# Patient Record
Sex: MALE | Race: BLACK OR AFRICAN AMERICAN | Employment: OTHER | URBAN - NONMETROPOLITAN AREA
[De-identification: names, ages, dates, MRNs, and addresses within clinical notes are randomized per-mention and may not be internally consistent; named-entity substitution may affect disease eponyms.]

---

## 2017-01-03 ENCOUNTER — OFFICE VISIT (OUTPATIENT)
Dept: FAMILY MEDICINE CLINIC | Age: 32
End: 2017-01-03
Payer: MEDICARE

## 2017-01-03 VITALS
TEMPERATURE: 99.1 F | WEIGHT: 211 LBS | DIASTOLIC BLOOD PRESSURE: 60 MMHG | BODY MASS INDEX: 35.11 KG/M2 | OXYGEN SATURATION: 98 % | HEART RATE: 110 BPM | SYSTOLIC BLOOD PRESSURE: 120 MMHG

## 2017-01-03 DIAGNOSIS — I10 ESSENTIAL HYPERTENSION: ICD-10-CM

## 2017-01-03 DIAGNOSIS — R73.02 GLUCOSE INTOLERANCE (IMPAIRED GLUCOSE TOLERANCE): Primary | ICD-10-CM

## 2017-01-03 DIAGNOSIS — F70 MILD INTELLECTUAL DISABILITY: ICD-10-CM

## 2017-01-03 PROCEDURE — 99214 OFFICE O/P EST MOD 30 MIN: CPT | Performed by: FAMILY MEDICINE

## 2017-01-03 RX ORDER — OLOPATADINE HYDROCHLORIDE 2 MG/ML
1 SOLUTION/ DROPS OPHTHALMIC DAILY
COMMUNITY

## 2017-01-03 ASSESSMENT — ENCOUNTER SYMPTOMS
ALLERGIC/IMMUNOLOGIC NEGATIVE: 1
EYES NEGATIVE: 1
GASTROINTESTINAL NEGATIVE: 1
RESPIRATORY NEGATIVE: 1

## 2017-01-18 DIAGNOSIS — R73.02 GLUCOSE INTOLERANCE (IMPAIRED GLUCOSE TOLERANCE): ICD-10-CM

## 2017-01-18 DIAGNOSIS — I10 ESSENTIAL HYPERTENSION: ICD-10-CM

## 2017-01-18 LAB
ALBUMIN SERPL-MCNC: 3.7 G/DL (ref 3.5–5.2)
ALP BLD-CCNC: 49 U/L (ref 40–130)
ALT SERPL-CCNC: 12 U/L (ref 5–41)
ANION GAP SERPL CALCULATED.3IONS-SCNC: 14 MMOL/L (ref 7–19)
AST SERPL-CCNC: 18 U/L (ref 5–40)
BILIRUB SERPL-MCNC: 0.3 MG/DL (ref 0.2–1.2)
BILIRUBIN URINE: NEGATIVE
BLOOD, URINE: NEGATIVE
BUN BLDV-MCNC: 13 MG/DL (ref 6–20)
CALCIUM SERPL-MCNC: 9.2 MG/DL (ref 8.6–10)
CHLORIDE BLD-SCNC: 97 MMOL/L (ref 98–111)
CHOLESTEROL, TOTAL: 167 MG/DL (ref 160–199)
CLARITY: CLEAR
CO2: 27 MMOL/L (ref 22–29)
COLOR: YELLOW
CREAT SERPL-MCNC: 1.1 MG/DL (ref 0.5–1.2)
GFR NON-AFRICAN AMERICAN: >60
GLOBULIN: 3.9 G/DL
GLUCOSE BLD-MCNC: 81 MG/DL (ref 74–109)
GLUCOSE URINE: NEGATIVE MG/DL
HBA1C MFR BLD: 5.7 %
HCT VFR BLD CALC: 47.8 % (ref 42–52)
HDLC SERPL-MCNC: 60 MG/DL (ref 55–121)
HEMOGLOBIN: 15.2 G/DL (ref 14–18)
KETONES, URINE: NEGATIVE MG/DL
LDL CHOLESTEROL CALCULATED: 89 MG/DL
LEUKOCYTE ESTERASE, URINE: NEGATIVE
MCH RBC QN AUTO: 25 PG (ref 27–31)
MCHC RBC AUTO-ENTMCNC: 31.8 G/DL (ref 33–37)
MCV RBC AUTO: 78.6 FL (ref 80–94)
NITRITE, URINE: NEGATIVE
PDW BLD-RTO: 15.3 % (ref 11.5–14.5)
PH UA: 7.5
PLATELET # BLD: 284 K/UL (ref 130–400)
PMV BLD AUTO: 11.3 FL (ref 7.4–10.4)
POTASSIUM SERPL-SCNC: 4.5 MMOL/L (ref 3.5–5)
PROTEIN UA: NEGATIVE MG/DL
RBC # BLD: 6.08 M/UL (ref 4.7–6.1)
SODIUM BLD-SCNC: 138 MMOL/L (ref 136–145)
SPECIFIC GRAVITY UA: 1.02
TOTAL PROTEIN: 7.6 G/DL (ref 6.6–8.7)
TRIGL SERPL-MCNC: 92 MG/DL (ref 150–199)
TSH SERPL DL<=0.05 MIU/L-ACNC: 1.11 UIU/ML (ref 0.27–4.2)
UROBILINOGEN, URINE: 0.2 E.U./DL
WBC # BLD: 6.2 K/UL (ref 4.8–10.8)

## 2017-08-01 ENCOUNTER — OFFICE VISIT (OUTPATIENT)
Dept: FAMILY MEDICINE CLINIC | Age: 32
End: 2017-08-01
Payer: MEDICARE

## 2017-08-01 VITALS
OXYGEN SATURATION: 97 % | DIASTOLIC BLOOD PRESSURE: 62 MMHG | WEIGHT: 184 LBS | HEART RATE: 98 BPM | BODY MASS INDEX: 30.66 KG/M2 | SYSTOLIC BLOOD PRESSURE: 116 MMHG | TEMPERATURE: 98.3 F | HEIGHT: 65 IN | RESPIRATION RATE: 16 BRPM

## 2017-08-01 DIAGNOSIS — F32.5 MAJOR DEPRESSION, SINGLE EPISODE, IN COMPLETE REMISSION (HCC): ICD-10-CM

## 2017-08-01 DIAGNOSIS — F20.89 OTHER SCHIZOPHRENIA (HCC): ICD-10-CM

## 2017-08-01 DIAGNOSIS — R73.02 GLUCOSE INTOLERANCE (IMPAIRED GLUCOSE TOLERANCE): ICD-10-CM

## 2017-08-01 DIAGNOSIS — R11.0 CHRONIC NAUSEA: Primary | ICD-10-CM

## 2017-08-01 DIAGNOSIS — R11.0 CHRONIC NAUSEA: ICD-10-CM

## 2017-08-01 LAB
ALBUMIN SERPL-MCNC: 4.4 G/DL (ref 3.5–5.2)
ALP BLD-CCNC: 50 U/L (ref 40–130)
ALT SERPL-CCNC: 12 U/L (ref 5–41)
AMYLASE: 72 U/L (ref 28–100)
ANION GAP SERPL CALCULATED.3IONS-SCNC: 16 MMOL/L (ref 7–19)
AST SERPL-CCNC: 17 U/L (ref 5–40)
BILIRUB SERPL-MCNC: <0.2 MG/DL (ref 0.2–1.2)
BUN BLDV-MCNC: 16 MG/DL (ref 6–20)
CALCIUM SERPL-MCNC: 9.8 MG/DL (ref 8.6–10)
CHLORIDE BLD-SCNC: 101 MMOL/L (ref 98–111)
CO2: 24 MMOL/L (ref 22–29)
CREAT SERPL-MCNC: 1.3 MG/DL (ref 0.5–1.2)
GFR NON-AFRICAN AMERICAN: >60
GLUCOSE BLD-MCNC: 64 MG/DL (ref 74–109)
HCT VFR BLD CALC: 49.3 % (ref 42–52)
HEMOGLOBIN: 16.2 G/DL (ref 14–18)
LIPASE: 28 U/L (ref 13–60)
MCH RBC QN AUTO: 25.7 PG (ref 27–31)
MCHC RBC AUTO-ENTMCNC: 32.9 G/DL (ref 33–37)
MCV RBC AUTO: 78.3 FL (ref 80–94)
PDW BLD-RTO: 17.8 % (ref 11.5–14.5)
PLATELET # BLD: 280 K/UL (ref 130–400)
PMV BLD AUTO: 10.7 FL (ref 9.4–12.4)
POTASSIUM SERPL-SCNC: 4.4 MMOL/L (ref 3.5–5)
RBC # BLD: 6.3 M/UL (ref 4.7–6.1)
SODIUM BLD-SCNC: 141 MMOL/L (ref 136–145)
TOTAL PROTEIN: 8 G/DL (ref 6.6–8.7)
WBC # BLD: 8.4 K/UL (ref 4.8–10.8)

## 2017-08-01 PROCEDURE — G8427 DOCREV CUR MEDS BY ELIG CLIN: HCPCS | Performed by: FAMILY MEDICINE

## 2017-08-01 PROCEDURE — 1036F TOBACCO NON-USER: CPT | Performed by: FAMILY MEDICINE

## 2017-08-01 PROCEDURE — G8419 CALC BMI OUT NRM PARAM NOF/U: HCPCS | Performed by: FAMILY MEDICINE

## 2017-08-01 PROCEDURE — 99214 OFFICE O/P EST MOD 30 MIN: CPT | Performed by: FAMILY MEDICINE

## 2017-08-01 ASSESSMENT — ENCOUNTER SYMPTOMS
ALLERGIC/IMMUNOLOGIC NEGATIVE: 1
RESPIRATORY NEGATIVE: 1
NAUSEA: 1
EYES NEGATIVE: 1

## 2017-08-03 ENCOUNTER — TELEPHONE (OUTPATIENT)
Dept: FAMILY MEDICINE CLINIC | Age: 32
End: 2017-08-03

## 2017-08-03 DIAGNOSIS — I10 ESSENTIAL HYPERTENSION: Primary | ICD-10-CM

## 2017-08-14 DIAGNOSIS — I10 ESSENTIAL HYPERTENSION: ICD-10-CM

## 2017-08-14 LAB
ANION GAP SERPL CALCULATED.3IONS-SCNC: 14 MMOL/L (ref 7–19)
BUN BLDV-MCNC: 6 MG/DL (ref 6–20)
CALCIUM SERPL-MCNC: 10 MG/DL (ref 8.6–10)
CHLORIDE BLD-SCNC: 97 MMOL/L (ref 98–111)
CO2: 26 MMOL/L (ref 22–29)
CREAT SERPL-MCNC: 1 MG/DL (ref 0.5–1.2)
GFR NON-AFRICAN AMERICAN: >60
GLUCOSE BLD-MCNC: 77 MG/DL (ref 74–109)
POTASSIUM SERPL-SCNC: 4.9 MMOL/L (ref 3.5–5)
SODIUM BLD-SCNC: 137 MMOL/L (ref 136–145)

## 2018-03-21 ENCOUNTER — TELEPHONE (OUTPATIENT)
Dept: FAMILY MEDICINE CLINIC | Age: 33
End: 2018-03-21

## 2018-08-31 ENCOUNTER — OFFICE VISIT (OUTPATIENT)
Dept: FAMILY MEDICINE CLINIC | Age: 33
End: 2018-08-31
Payer: MEDICARE

## 2018-08-31 VITALS
OXYGEN SATURATION: 98 % | HEIGHT: 65 IN | HEART RATE: 93 BPM | BODY MASS INDEX: 35.65 KG/M2 | DIASTOLIC BLOOD PRESSURE: 78 MMHG | WEIGHT: 214 LBS | TEMPERATURE: 98.6 F | SYSTOLIC BLOOD PRESSURE: 120 MMHG

## 2018-08-31 DIAGNOSIS — R79.89 PROLACTIN INCREASED: Primary | ICD-10-CM

## 2018-08-31 DIAGNOSIS — I10 ESSENTIAL HYPERTENSION: ICD-10-CM

## 2018-08-31 DIAGNOSIS — F20.89 OTHER SCHIZOPHRENIA (HCC): ICD-10-CM

## 2018-08-31 DIAGNOSIS — R79.89 PROLACTIN INCREASED: ICD-10-CM

## 2018-08-31 DIAGNOSIS — J30.9 ALLERGIC RHINITIS, UNSPECIFIED SEASONALITY, UNSPECIFIED TRIGGER: ICD-10-CM

## 2018-08-31 DIAGNOSIS — F39 EPISODIC MOOD DISORDER (HCC): ICD-10-CM

## 2018-08-31 LAB
ALBUMIN SERPL-MCNC: 4.3 G/DL (ref 3.5–5.2)
ALP BLD-CCNC: 51 U/L (ref 40–130)
ALT SERPL-CCNC: 13 U/L (ref 5–41)
ANION GAP SERPL CALCULATED.3IONS-SCNC: 14 MMOL/L (ref 7–19)
AST SERPL-CCNC: 18 U/L (ref 5–40)
BILIRUB SERPL-MCNC: <0.2 MG/DL (ref 0.2–1.2)
BILIRUBIN URINE: NEGATIVE
BLOOD, URINE: NEGATIVE
BUN BLDV-MCNC: 18 MG/DL (ref 6–20)
CALCIUM SERPL-MCNC: 9.3 MG/DL (ref 8.6–10)
CHLORIDE BLD-SCNC: 98 MMOL/L (ref 98–111)
CHOLESTEROL, TOTAL: 163 MG/DL (ref 160–199)
CLARITY: CLEAR
CO2: 26 MMOL/L (ref 22–29)
COLOR: YELLOW
CREAT SERPL-MCNC: 1.1 MG/DL (ref 0.5–1.2)
GFR NON-AFRICAN AMERICAN: >60
GLUCOSE BLD-MCNC: 87 MG/DL (ref 74–109)
GLUCOSE URINE: NEGATIVE MG/DL
HCT VFR BLD CALC: 44.6 % (ref 42–52)
HDLC SERPL-MCNC: 55 MG/DL (ref 55–121)
HEMOGLOBIN: 14.3 G/DL (ref 14–18)
KETONES, URINE: NEGATIVE MG/DL
LDL CHOLESTEROL CALCULATED: 87 MG/DL
LEUKOCYTE ESTERASE, URINE: NEGATIVE
MCH RBC QN AUTO: 25.3 PG (ref 27–31)
MCHC RBC AUTO-ENTMCNC: 32.1 G/DL (ref 33–37)
MCV RBC AUTO: 78.8 FL (ref 80–94)
NITRITE, URINE: NEGATIVE
PDW BLD-RTO: 14.8 % (ref 11.5–14.5)
PH UA: 7.5
PLATELET # BLD: 231 K/UL (ref 130–400)
PMV BLD AUTO: 10.9 FL (ref 9.4–12.4)
POTASSIUM SERPL-SCNC: 4.8 MMOL/L (ref 3.5–5)
PROTEIN UA: NEGATIVE MG/DL
RBC # BLD: 5.66 M/UL (ref 4.7–6.1)
SODIUM BLD-SCNC: 138 MMOL/L (ref 136–145)
SPECIFIC GRAVITY UA: 1.02
TOTAL PROTEIN: 7.8 G/DL (ref 6.6–8.7)
TRIGL SERPL-MCNC: 103 MG/DL (ref 0–149)
TSH SERPL DL<=0.05 MIU/L-ACNC: 1.34 UIU/ML (ref 0.27–4.2)
UROBILINOGEN, URINE: 0.2 E.U./DL
WBC # BLD: 4.3 K/UL (ref 4.8–10.8)

## 2018-08-31 PROCEDURE — G8427 DOCREV CUR MEDS BY ELIG CLIN: HCPCS | Performed by: FAMILY MEDICINE

## 2018-08-31 PROCEDURE — 99214 OFFICE O/P EST MOD 30 MIN: CPT | Performed by: FAMILY MEDICINE

## 2018-08-31 PROCEDURE — G8417 CALC BMI ABV UP PARAM F/U: HCPCS | Performed by: FAMILY MEDICINE

## 2018-08-31 PROCEDURE — 1036F TOBACCO NON-USER: CPT | Performed by: FAMILY MEDICINE

## 2018-08-31 RX ORDER — IBUPROFEN 800 MG/1
800 TABLET ORAL EVERY 6 HOURS PRN
COMMUNITY

## 2018-08-31 ASSESSMENT — ENCOUNTER SYMPTOMS
EYES NEGATIVE: 1
RESPIRATORY NEGATIVE: 1
GASTROINTESTINAL NEGATIVE: 1
ALLERGIC/IMMUNOLOGIC NEGATIVE: 1

## 2018-08-31 ASSESSMENT — PATIENT HEALTH QUESTIONNAIRE - PHQ9
SUM OF ALL RESPONSES TO PHQ QUESTIONS 1-9: 0
1. LITTLE INTEREST OR PLEASURE IN DOING THINGS: 0
SUM OF ALL RESPONSES TO PHQ9 QUESTIONS 1 & 2: 0
2. FEELING DOWN, DEPRESSED OR HOPELESS: 0
SUM OF ALL RESPONSES TO PHQ QUESTIONS 1-9: 0

## 2018-08-31 NOTE — PROGRESS NOTES
hours as needed for Pain.  loperamide (IMODIUM) 2 MG capsule Take 2 mg by mouth 4 times daily as needed for Diarrhea. No current facility-administered medications on file prior to visit. Allergies   Allergen Reactions    Citalopram     Lexapro [Escitalopram Oxalate]     Seasonal     Serotonin Reuptake Inhibitors (Ssris)      History reviewed. No pertinent surgical history. Family History   Problem Relation Age of Onset    Family history unknown: Yes     Social History     Social History    Marital status: Single     Spouse name: N/A    Number of children: N/A    Years of education: N/A     Occupational History    Not on file. Social History Main Topics    Smoking status: Former Smoker    Smokeless tobacco: Never Used    Alcohol use No    Drug use: No    Sexual activity: Not on file     Other Topics Concern    Not on file     Social History Narrative    No narrative on file       Review of Systems   Constitutional: Negative. HENT: Negative. Eyes: Negative. Respiratory: Negative. Cardiovascular: Negative. Gastrointestinal: Negative. Endocrine: Negative. Genitourinary: Negative. Musculoskeletal: Negative. Skin: Negative. Allergic/Immunologic: Negative. Neurological: Negative. Hematological: Negative. Psychiatric/Behavioral: Negative. OBJECTIVE:    Physical Exam   Constitutional: He is oriented to person, place, and time. He appears well-developed and well-nourished. HENT:   Head: Normocephalic and atraumatic. Right Ear: External ear normal.   Left Ear: External ear normal.   Nose: Nose normal.   Mouth/Throat: Oropharynx is clear and moist.   Eyes: Pupils are equal, round, and reactive to light. Conjunctivae and EOM are normal.   Neck: Normal range of motion. Neck supple. Cardiovascular: Normal rate, regular rhythm, S1 normal, S2 normal, normal heart sounds, intact distal pulses and normal pulses.     Pulmonary/Chest: Effort normal and breath sounds normal. No apnea. Abdominal: Soft. Normal appearance. Musculoskeletal: Normal range of motion. Neurological: He is alert and oriented to person, place, and time. He has normal strength and normal reflexes. Skin: Skin is warm, dry and intact. Psychiatric: He has a normal mood and affect. His speech is normal and behavior is normal. Judgment and thought content normal. Cognition and memory are normal.   Vitals reviewed. /78 (Site: Left Arm, Position: Sitting, Cuff Size: Large Adult)   Pulse 93   Temp 98.6 °F (37 °C) (Oral)   Ht 5' 5\" (1.651 m)   Wt 214 lb (97.1 kg)   SpO2 98%   BMI 35.61 kg/m²      ASSESSMENT:     Diagnosis Orders   1. Medicare annual wellness visit, subsequent     2. Prolactin increased ([facility] Will continue to monitor. More than likely secondary to medications. PTH, intact and calcium    Prolactin   3. Other schizophrenia ([facility] Follow by psychiatry     4. Episodic mood disorder ([facility] Followed by psychiatry. 5. Essential hypertension Stable  CBC    Comprehensive Metabolic Panel    Lipid Panel    Urinalysis    TSH without Reflex   6. Allergic rhinitis, unspecified seasonality, unspecified trigger Well control          PLAN:    1. See other note. 2. Obtain pro-lactate levels. 3. Follow-up with psychiatry. 4. Follow with psychiatry. 5. Continue blood pressure medication obtain blood work today. 6. Continue medications.

## 2018-08-31 NOTE — PROGRESS NOTES
3 mg by mouth Take 2 tablets at bedtime      acetaminophen (TYLENOL) 500 MG tablet Take 1,000 mg by mouth every 4 hours as needed for Pain.  loperamide (IMODIUM) 2 MG capsule Take 2 mg by mouth 4 times daily as needed for Diarrhea. No current facility-administered medications for this visit. Allergies   Allergen Reactions    Citalopram     Lexapro [Escitalopram Oxalate]     Seasonal     Serotonin Reuptake Inhibitors (Ssris)        Review of Systems   Constitutional: Negative. HENT: Negative. Eyes: Negative. Respiratory: Negative. Cardiovascular: Negative. Gastrointestinal: Negative. Endocrine: Negative. Genitourinary: Negative. Musculoskeletal: Negative. Skin: Negative. Allergic/Immunologic: Negative. Neurological: Negative. Hematological: Negative. Psychiatric/Behavioral: Negative. OBJECTIVE:    Physical Exam   Constitutional: He is oriented to person, place, and time. He appears well-developed and well-nourished. HENT:   Head: Normocephalic and atraumatic. Right Ear: External ear normal.   Left Ear: External ear normal.   Nose: Nose normal.   Mouth/Throat: Oropharynx is clear and moist.   Eyes: Pupils are equal, round, and reactive to light. Conjunctivae and EOM are normal.   Neck: Normal range of motion. Neck supple. Cardiovascular: Normal rate, regular rhythm, S1 normal, S2 normal, normal heart sounds, intact distal pulses and normal pulses. Pulmonary/Chest: Effort normal and breath sounds normal. No apnea. Abdominal: Soft. Normal appearance. Musculoskeletal: Normal range of motion. Neurological: He is alert and oriented to person, place, and time. He has normal strength and normal reflexes. Skin: Skin is warm, dry and intact. Psychiatric: He has a normal mood and affect. His speech is normal and behavior is normal. Judgment and thought content normal. Cognition and memory are normal.   Vitals reviewed.      /78 (Site:

## 2018-09-02 LAB — PROLACTIN: 30.2 NG/ML (ref 2.1–17.7)

## 2018-09-28 LAB
ALBUMIN SERPL-MCNC: 4 G/DL (ref 3.5–5.2)
ALP BLD-CCNC: 50 U/L (ref 40–130)
ALT SERPL-CCNC: 18 U/L (ref 5–41)
ANION GAP SERPL CALCULATED.3IONS-SCNC: 14 MMOL/L (ref 7–19)
AST SERPL-CCNC: 19 U/L (ref 5–40)
BASOPHILS ABSOLUTE: 0 K/UL (ref 0–0.2)
BASOPHILS RELATIVE PERCENT: 0.6 % (ref 0–1)
BILIRUB SERPL-MCNC: <0.2 MG/DL (ref 0.2–1.2)
BUN BLDV-MCNC: 13 MG/DL (ref 6–20)
CALCIUM SERPL-MCNC: 9.7 MG/DL (ref 8.6–10)
CHLORIDE BLD-SCNC: 99 MMOL/L (ref 98–111)
CHOLESTEROL, TOTAL: 167 MG/DL (ref 160–199)
CO2: 27 MMOL/L (ref 22–29)
CREAT SERPL-MCNC: 1 MG/DL (ref 0.5–1.2)
EOSINOPHILS ABSOLUTE: 0.2 K/UL (ref 0–0.6)
EOSINOPHILS RELATIVE PERCENT: 3.1 % (ref 0–5)
GFR NON-AFRICAN AMERICAN: >60
GLUCOSE BLD-MCNC: 111 MG/DL (ref 74–109)
HCT VFR BLD CALC: 44.3 % (ref 42–52)
HDLC SERPL-MCNC: 54 MG/DL (ref 55–121)
HEMOGLOBIN: 14 G/DL (ref 14–18)
LDL CHOLESTEROL CALCULATED: 75 MG/DL
LYMPHOCYTES ABSOLUTE: 2.4 K/UL (ref 1.1–4.5)
LYMPHOCYTES RELATIVE PERCENT: 43.9 % (ref 20–40)
MCH RBC QN AUTO: 25.2 PG (ref 27–31)
MCHC RBC AUTO-ENTMCNC: 31.6 G/DL (ref 33–37)
MCV RBC AUTO: 79.8 FL (ref 80–94)
MONOCYTES ABSOLUTE: 0.5 K/UL (ref 0–0.9)
MONOCYTES RELATIVE PERCENT: 9.2 % (ref 0–10)
NEUTROPHILS ABSOLUTE: 2.3 K/UL (ref 1.5–7.5)
NEUTROPHILS RELATIVE PERCENT: 42.6 % (ref 50–65)
PDW BLD-RTO: 15.6 % (ref 11.5–14.5)
PLATELET # BLD: 209 K/UL (ref 130–400)
PMV BLD AUTO: 11.7 FL (ref 9.4–12.4)
POTASSIUM SERPL-SCNC: 4.2 MMOL/L (ref 3.5–5)
RBC # BLD: 5.55 M/UL (ref 4.7–6.1)
SODIUM BLD-SCNC: 140 MMOL/L (ref 136–145)
TOTAL PROTEIN: 7.1 G/DL (ref 6.6–8.7)
TRIGL SERPL-MCNC: 190 MG/DL (ref 0–149)
WBC # BLD: 5.5 K/UL (ref 4.8–10.8)

## 2018-10-01 LAB — KEPPRA: <2 UG/ML (ref 12–46)

## 2018-10-04 ENCOUNTER — TELEPHONE (OUTPATIENT)
Dept: FAMILY MEDICINE CLINIC | Age: 33
End: 2018-10-04

## 2018-10-05 RX ORDER — CALCIUM CARBONATE 200(500)MG
2 TABLET,CHEWABLE ORAL DAILY PRN
Qty: 90 TABLET | Refills: 3 | Status: SHIPPED | OUTPATIENT
Start: 2018-10-05 | End: 2018-11-04

## 2018-12-04 ENCOUNTER — TELEPHONE (OUTPATIENT)
Dept: FAMILY MEDICINE CLINIC | Age: 33
End: 2018-12-04

## 2018-12-04 DIAGNOSIS — K21.9 GASTROESOPHAGEAL REFLUX DISEASE WITHOUT ESOPHAGITIS: Primary | ICD-10-CM

## 2018-12-04 RX ORDER — OMEPRAZOLE 20 MG/1
20 CAPSULE, DELAYED RELEASE ORAL
Qty: 30 CAPSULE | Refills: 5 | Status: SHIPPED | OUTPATIENT
Start: 2018-12-04 | End: 2019-05-31 | Stop reason: SDUPTHER

## 2019-03-19 DIAGNOSIS — I10 ESSENTIAL HYPERTENSION: ICD-10-CM

## 2019-03-19 LAB
ALBUMIN SERPL-MCNC: 4 G/DL (ref 3.5–5.2)
ALP BLD-CCNC: 54 U/L (ref 40–130)
ALT SERPL-CCNC: 16 U/L (ref 5–41)
ANION GAP SERPL CALCULATED.3IONS-SCNC: 13 MMOL/L (ref 7–19)
AST SERPL-CCNC: 19 U/L (ref 5–40)
BASOPHILS ABSOLUTE: 0.1 K/UL (ref 0–0.2)
BASOPHILS RELATIVE PERCENT: 0.7 % (ref 0–1)
BILIRUB SERPL-MCNC: <0.2 MG/DL (ref 0.2–1.2)
BUN BLDV-MCNC: 12 MG/DL (ref 6–20)
CALCIUM SERPL-MCNC: 9.3 MG/DL (ref 8.6–10)
CHLORIDE BLD-SCNC: 99 MMOL/L (ref 98–111)
CHOLESTEROL, TOTAL: 166 MG/DL (ref 160–199)
CO2: 28 MMOL/L (ref 22–29)
CREAT SERPL-MCNC: 0.9 MG/DL (ref 0.5–1.2)
EOSINOPHILS ABSOLUTE: 0.1 K/UL (ref 0–0.6)
EOSINOPHILS RELATIVE PERCENT: 1.6 % (ref 0–5)
GFR NON-AFRICAN AMERICAN: >60
GLUCOSE BLD-MCNC: 98 MG/DL (ref 74–109)
HCT VFR BLD CALC: 45.1 % (ref 42–52)
HDLC SERPL-MCNC: 47 MG/DL (ref 55–121)
HEMOGLOBIN: 13.9 G/DL (ref 14–18)
LDL CHOLESTEROL CALCULATED: 88 MG/DL
LYMPHOCYTES ABSOLUTE: 3.4 K/UL (ref 1.1–4.5)
LYMPHOCYTES RELATIVE PERCENT: 49.1 % (ref 20–40)
MCH RBC QN AUTO: 24.8 PG (ref 27–31)
MCHC RBC AUTO-ENTMCNC: 30.8 G/DL (ref 33–37)
MCV RBC AUTO: 80.5 FL (ref 80–94)
MONOCYTES ABSOLUTE: 0.6 K/UL (ref 0–0.9)
MONOCYTES RELATIVE PERCENT: 8.7 % (ref 0–10)
NEUTROPHILS ABSOLUTE: 2.6 K/UL (ref 1.5–7.5)
NEUTROPHILS RELATIVE PERCENT: 38.4 % (ref 50–65)
PDW BLD-RTO: 16 % (ref 11.5–14.5)
PLATELET # BLD: 325 K/UL (ref 130–400)
PMV BLD AUTO: 11 FL (ref 9.4–12.4)
POTASSIUM SERPL-SCNC: 4.4 MMOL/L (ref 3.5–5)
RBC # BLD: 5.6 M/UL (ref 4.7–6.1)
SODIUM BLD-SCNC: 140 MMOL/L (ref 136–145)
TOTAL PROTEIN: 7.5 G/DL (ref 6.6–8.7)
TRIGL SERPL-MCNC: 154 MG/DL (ref 0–149)
VALPROIC ACID LEVEL: 76.7 UG/ML (ref 50–100)
WBC # BLD: 6.9 K/UL (ref 4.8–10.8)

## 2019-03-19 RX ORDER — LISINOPRIL AND HYDROCHLOROTHIAZIDE 20; 12.5 MG/1; MG/1
1 TABLET ORAL DAILY
Qty: 90 TABLET | Refills: 3 | Status: SHIPPED | OUTPATIENT
Start: 2019-03-19 | End: 2020-09-08

## 2019-04-04 ENCOUNTER — TELEPHONE (OUTPATIENT)
Dept: FAMILY MEDICINE CLINIC | Age: 34
End: 2019-04-04

## 2019-04-05 RX ORDER — NICOTINE 21 MG/24HR
1 PATCH, TRANSDERMAL 24 HOURS TRANSDERMAL EVERY 24 HOURS
Qty: 30 PATCH | Refills: 5 | Status: SHIPPED | OUTPATIENT
Start: 2019-04-05 | End: 2019-05-07 | Stop reason: ALTCHOICE

## 2019-05-07 ENCOUNTER — TELEPHONE (OUTPATIENT)
Dept: FAMILY MEDICINE CLINIC | Age: 34
End: 2019-05-07

## 2019-05-07 DIAGNOSIS — R73.02 GLUCOSE INTOLERANCE (IMPAIRED GLUCOSE TOLERANCE): ICD-10-CM

## 2019-05-07 NOTE — TELEPHONE ENCOUNTER
Antoinette Found, with [facility], called stating they are using the nicotine patches on Martell Spence, but he is still smoking. Wanting to know if they should D/C the patches and if you had any other recommendations?

## 2019-05-31 DIAGNOSIS — K21.9 GASTROESOPHAGEAL REFLUX DISEASE WITHOUT ESOPHAGITIS: ICD-10-CM

## 2019-05-31 RX ORDER — OMEPRAZOLE 20 MG/1
20 CAPSULE, DELAYED RELEASE ORAL
Qty: 30 CAPSULE | Refills: 5 | Status: SHIPPED | OUTPATIENT
Start: 2019-05-31 | End: 2021-03-08

## 2019-09-18 ENCOUNTER — OFFICE VISIT (OUTPATIENT)
Dept: FAMILY MEDICINE CLINIC | Age: 34
End: 2019-09-18
Payer: MEDICARE

## 2019-09-18 VITALS
WEIGHT: 244 LBS | OXYGEN SATURATION: 98 % | TEMPERATURE: 97.7 F | HEIGHT: 65 IN | DIASTOLIC BLOOD PRESSURE: 78 MMHG | BODY MASS INDEX: 40.65 KG/M2 | HEART RATE: 112 BPM | SYSTOLIC BLOOD PRESSURE: 122 MMHG

## 2019-09-18 DIAGNOSIS — Z00.00 WELLNESS EXAMINATION: Primary | ICD-10-CM

## 2019-09-18 DIAGNOSIS — E66.01 MORBID OBESITY WITH BMI OF 40.0-44.9, ADULT (HCC): ICD-10-CM

## 2019-09-18 PROCEDURE — G0438 PPPS, INITIAL VISIT: HCPCS | Performed by: FAMILY MEDICINE

## 2019-09-18 RX ORDER — CALCIUM CARBONATE 500(1250)
500 TABLET ORAL DAILY
COMMUNITY
End: 2021-09-22

## 2019-09-18 RX ORDER — LORATADINE 10 MG/1
TABLET ORAL
Qty: 30 TABLET | Refills: 11 | Status: SHIPPED | OUTPATIENT
Start: 2019-09-18 | End: 2021-03-08

## 2019-09-18 ASSESSMENT — ENCOUNTER SYMPTOMS
RESPIRATORY NEGATIVE: 1
EYES NEGATIVE: 1
ALLERGIC/IMMUNOLOGIC NEGATIVE: 1
GASTROINTESTINAL NEGATIVE: 1

## 2019-09-18 NOTE — PROGRESS NOTES
 benztropine (COGENTIN) 2 MG tablet Take 2 mg by mouth 2 times daily.  divalproex (DEPAKOTE) 500 MG ER tablet Take 500 mg by mouth 3 times daily       risperiDONE (RISPERDAL) 2 MG tablet Take 3 mg by mouth Take 2 tablets at bedtime      acetaminophen (TYLENOL) 500 MG tablet Take 1,000 mg by mouth every 4 hours as needed for Pain.  loperamide (IMODIUM) 2 MG capsule Take 2 mg by mouth 4 times daily as needed for Diarrhea. No current facility-administered medications for this visit. Allergies   Allergen Reactions    Citalopram     Lexapro [Escitalopram Oxalate]     Seasonal     Serotonin Reuptake Inhibitors (Ssris)        Review of Systems   Constitutional: Negative. HENT: Negative. Eyes: Negative. Respiratory: Negative. Cardiovascular: Negative. Gastrointestinal: Negative. Endocrine: Negative. Genitourinary: Negative. Musculoskeletal: Negative. Skin: Negative. Allergic/Immunologic: Negative. Neurological: Negative. Hematological: Negative. Psychiatric/Behavioral: Negative. OBJECTIVE:    Physical Exam   Constitutional: He is oriented to person, place, and time. He appears well-developed and well-nourished. HENT:   Head: Normocephalic and atraumatic. Right Ear: External ear normal.   Left Ear: External ear normal.   Nose: Nose normal.   Mouth/Throat: Oropharynx is clear and moist.   Eyes: Pupils are equal, round, and reactive to light. Conjunctivae and EOM are normal.   Neck: Normal range of motion. Neck supple. Cardiovascular: Normal rate, regular rhythm, S1 normal, S2 normal, normal heart sounds, intact distal pulses and normal pulses. Pulmonary/Chest: Effort normal and breath sounds normal. No apnea. Abdominal: Soft. Normal appearance. Musculoskeletal: Normal range of motion. Neurological: He is alert and oriented to person, place, and time. He has normal strength and normal reflexes. Skin: Skin is warm, dry and intact. Psychiatric: He has a normal mood and affect. His speech is normal and behavior is normal. Judgment and thought content normal. Cognition and memory are normal.   Vitals reviewed. /78 (Site: Left Upper Arm, Position: Sitting, Cuff Size: Large Adult)   Pulse 112   Temp 97.7 °F (36.5 °C) (Temporal)   Ht 5' 5\" (1.651 m)   Wt 244 lb (110.7 kg)   SpO2 98%   BMI 40.60 kg/m²      ASSESSMENT:     Diagnosis Orders   1. Wellness examination     2. Morbid obesity with BMI of 40.0-44.9, adult ([facility])          PLAN:    1./2.  Encourage diet and exercise. Discussed with psychiatry the necessity of using 2 different antipsychotics. Blood work obtained earlier this year. Follow-up in 6 months for reassessment.

## 2019-09-18 NOTE — PATIENT INSTRUCTIONS
exposed skin. · See a dentist one or two times a year for checkups and to have your teeth cleaned. · Wear a seat belt in the car. Follow your doctor's advice about when to have certain tests. These tests can spot problems early. For everyone  · Cholesterol. Have the fat (cholesterol) in your blood tested after age 21. Your doctor will tell you how often to have this done based on your age, family history, or other things that can increase your risk for heart disease. · Blood pressure. Have your blood pressure checked during a routine doctor visit. Your doctor will tell you how often to check your blood pressure based on your age, your blood pressure results, and other factors. · Vision. Talk with your doctor about how often to have a glaucoma test.  · Diabetes. Ask your doctor whether you should have tests for diabetes. · Colon cancer. Your risk for colorectal cancer gets higher as you get older. Some experts say that adults should start regular screening at age 48 and stop at age 76. Others say to start before age 48 or continue after age 76. Talk with your doctor about your risk and when to start and stop screening. For women  · Breast exam and mammogram. Talk to your doctor about when you should have a clinical breast exam and a mammogram. Medical experts differ on whether and how often women under 50 should have these tests. Your doctor can help you decide what is right for you. · Cervical cancer screening test and pelvic exam. Begin with a Pap test at age 24. The test often is part of a pelvic exam. Starting at age 27, you may choose to have a Pap test, an HPV test, or both tests at the same time (called co-testing). Talk with your doctor about how often to have testing. · Tests for sexually transmitted infections (STIs). Ask whether you should have tests for STIs. You may be at risk if you have sex with more than one person, especially if your partners do not wear condoms.   For men  · Tests for sexually transmitted infections (STIs). Ask whether you should have tests for STIs. You may be at risk if you have sex with more than one person, especially if you do not wear a condom. · Testicular cancer exam. Ask your doctor whether you should check your testicles regularly. · Prostate exam. Talk to your doctor about whether you should have a blood test (called a PSA test) for prostate cancer. Experts differ on whether and when men should have this test. Some experts suggest it if you are older than 39 and are -American or have a father or brother who got prostate cancer when he was younger than 72. When should you call for help? Watch closely for changes in your health, and be sure to contact your doctor if you have any problems or symptoms that concern you. Where can you learn more? Go to [email or web address] org and sign in to your Narrative account. Enter P072 in the [place] box to learn more about \"Well Visit, Ages 25 to 48: Care Instructions. \"     If you do not have an account, please click on the \"Sign Up Now\" link. Current as of: December 13, 2018  Content Version: 12.1  © 0187-7108 Healthwise, Incorporated. Care instructions adapted under license by [facility] ([facility]). If you have questions about a medical condition or this instruction, always ask your healthcare professional. [address] any warranty or liability for your use of this information. Patient Education        Starting a Weight Loss Plan: Care Instructions  Your Care Instructions    If you are thinking about losing weight, it can be hard to know where to start. Your doctor can help you set up a weight loss plan that best meets your needs. You may want to take a class on nutrition or exercise, or join a weight loss support group.  If you have questions about how to make changes to your eating or exercise habits, ask your doctor about seeing a registered dietitian or an to [email or web address] and sign in to your DocASAPhart account. Enter Q387 in the [place] box to learn more about \"Starting a Weight Loss Plan: Care Instructions. \"     If you do not have an account, please click on the \"Sign Up Now\" link. Current as of: March 28, 2019  Content Version: 12.1  © 4422-5190 Healthwise, [facility]. Care instructions adapted under license by [facility] ([facility]). If you have questions about a medical condition or this instruction, always ask your healthcare professional. [place] [address] any warranty or liability for your use of this information.

## 2019-12-26 ENCOUNTER — TELEPHONE (OUTPATIENT)
Dept: FAMILY MEDICINE CLINIC | Age: 34
End: 2019-12-26

## 2020-03-16 ENCOUNTER — OFFICE VISIT (OUTPATIENT)
Dept: FAMILY MEDICINE CLINIC | Age: 35
End: 2020-03-16
Payer: MEDICARE

## 2020-03-16 VITALS
WEIGHT: 253 LBS | OXYGEN SATURATION: 98 % | DIASTOLIC BLOOD PRESSURE: 74 MMHG | SYSTOLIC BLOOD PRESSURE: 120 MMHG | HEART RATE: 110 BPM | TEMPERATURE: 97.4 F | BODY MASS INDEX: 42.1 KG/M2

## 2020-03-16 DIAGNOSIS — F20.89 OTHER SCHIZOPHRENIA (HCC): ICD-10-CM

## 2020-03-16 DIAGNOSIS — R79.89 PROLACTIN INCREASED: ICD-10-CM

## 2020-03-16 DIAGNOSIS — R73.02 GLUCOSE INTOLERANCE (IMPAIRED GLUCOSE TOLERANCE): ICD-10-CM

## 2020-03-16 LAB
ALBUMIN SERPL-MCNC: 4.3 G/DL (ref 3.5–5.2)
ALP BLD-CCNC: 58 U/L (ref 40–130)
ALT SERPL-CCNC: 20 U/L (ref 5–41)
ANION GAP SERPL CALCULATED.3IONS-SCNC: 16 MMOL/L (ref 7–19)
AST SERPL-CCNC: 23 U/L (ref 5–40)
BILIRUB SERPL-MCNC: <0.2 MG/DL (ref 0.2–1.2)
BUN BLDV-MCNC: 11 MG/DL (ref 6–20)
CALCIUM SERPL-MCNC: 9.8 MG/DL (ref 8.6–10)
CHLORIDE BLD-SCNC: 94 MMOL/L (ref 98–111)
CO2: 26 MMOL/L (ref 22–29)
CREAT SERPL-MCNC: 0.9 MG/DL (ref 0.5–1.2)
GFR NON-AFRICAN AMERICAN: >60
GLUCOSE BLD-MCNC: 110 MG/DL (ref 74–109)
HBA1C MFR BLD: 6.2 % (ref 4–6)
HCT VFR BLD CALC: 45.8 % (ref 42–52)
HEMOGLOBIN: 14.2 G/DL (ref 14–18)
MCH RBC QN AUTO: 24.5 PG (ref 27–31)
MCHC RBC AUTO-ENTMCNC: 31 G/DL (ref 33–37)
MCV RBC AUTO: 79.1 FL (ref 80–94)
PDW BLD-RTO: 16.9 % (ref 11.5–14.5)
PLATELET # BLD: 275 K/UL (ref 130–400)
PMV BLD AUTO: 11.5 FL (ref 9.4–12.4)
POTASSIUM SERPL-SCNC: 4 MMOL/L (ref 3.5–5)
RBC # BLD: 5.79 M/UL (ref 4.7–6.1)
SODIUM BLD-SCNC: 136 MMOL/L (ref 136–145)
TOTAL PROTEIN: 8 G/DL (ref 6.6–8.7)
TSH SERPL DL<=0.05 MIU/L-ACNC: 1.48 UIU/ML (ref 0.27–4.2)
WBC # BLD: 7 K/UL (ref 4.8–10.8)

## 2020-03-16 PROCEDURE — G8427 DOCREV CUR MEDS BY ELIG CLIN: HCPCS | Performed by: FAMILY MEDICINE

## 2020-03-16 PROCEDURE — 99214 OFFICE O/P EST MOD 30 MIN: CPT | Performed by: FAMILY MEDICINE

## 2020-03-16 PROCEDURE — G8417 CALC BMI ABV UP PARAM F/U: HCPCS | Performed by: FAMILY MEDICINE

## 2020-03-16 PROCEDURE — 1036F TOBACCO NON-USER: CPT | Performed by: FAMILY MEDICINE

## 2020-03-16 PROCEDURE — G8484 FLU IMMUNIZE NO ADMIN: HCPCS | Performed by: FAMILY MEDICINE

## 2020-03-16 RX ORDER — BENZONATATE 200 MG/1
200 CAPSULE ORAL 3 TIMES DAILY PRN
COMMUNITY

## 2020-03-16 ASSESSMENT — ENCOUNTER SYMPTOMS
RESPIRATORY NEGATIVE: 1
EYES NEGATIVE: 1
GASTROINTESTINAL NEGATIVE: 1
ALLERGIC/IMMUNOLOGIC NEGATIVE: 1

## 2020-03-16 ASSESSMENT — PATIENT HEALTH QUESTIONNAIRE - PHQ9
2. FEELING DOWN, DEPRESSED OR HOPELESS: 0
SUM OF ALL RESPONSES TO PHQ QUESTIONS 1-9: 0
SUM OF ALL RESPONSES TO PHQ QUESTIONS 1-9: 0
SUM OF ALL RESPONSES TO PHQ9 QUESTIONS 1 & 2: 0
1. LITTLE INTEREST OR PLEASURE IN DOING THINGS: 0

## 2020-03-18 LAB — PROLACTIN: 38.8 NG/ML (ref 2.1–17.7)

## 2020-04-30 ENCOUNTER — VIRTUAL VISIT (OUTPATIENT)
Dept: FAMILY MEDICINE CLINIC | Age: 35
End: 2020-04-30
Payer: MEDICARE

## 2020-04-30 PROCEDURE — 99441 PR PHYS/QHP TELEPHONE EVALUATION 5-10 MIN: CPT | Performed by: FAMILY MEDICINE

## 2020-04-30 NOTE — PROGRESS NOTES
Meño Turner is a 30 y.o. male evaluated via telephone on 4/30/2020. Consent:  He and/or health care decision maker is aware that that he may receive a bill for this telephone service, depending on his insurance coverage, and has provided verbal consent to proceed: Yes      Documentation:  I communicated with the patient and/or health care decision maker about patient is a 56-year-old -American male. He has mental disabilities and lives in a group home. He has been having less erections according to him. Not sure exactly if he want to pursue therapy. He does not seem to be able to verbalize he require therapy. Details of this discussion including any medical advice provided: I explained to him that erectile dysfunction is common with advanced age. His sexual ability will decrease with time. At the same time medication side effect can be contributing to this. Also smoking does not help and will encourage him to quit smoking. Weight control and exercise will be beneficial also. Offered therapy but not sure if that was something that he wanted. At this point we will not prescribe anything      I affirm this is a Patient Initiated Episode with an Established Patient who has not had a related appointment within my department in the past 7 days or scheduled within the next 24 hours.     Patient identification was verified at the start of the visit: Yes    Total Time: minutes: 5-10 minutes    Note: not billable if this call serves to triage the patient into an appointment for the relevant concern      Sonia White

## 2020-09-02 ENCOUNTER — TELEPHONE (OUTPATIENT)
Dept: FAMILY MEDICINE CLINIC | Age: 35
End: 2020-09-02

## 2020-09-08 ENCOUNTER — OFFICE VISIT (OUTPATIENT)
Dept: FAMILY MEDICINE CLINIC | Age: 35
End: 2020-09-08
Payer: MEDICARE

## 2020-09-08 VITALS
OXYGEN SATURATION: 97 % | WEIGHT: 252 LBS | HEART RATE: 102 BPM | DIASTOLIC BLOOD PRESSURE: 82 MMHG | BODY MASS INDEX: 41.93 KG/M2 | SYSTOLIC BLOOD PRESSURE: 122 MMHG | TEMPERATURE: 97.3 F

## 2020-09-08 PROCEDURE — G8417 CALC BMI ABV UP PARAM F/U: HCPCS | Performed by: FAMILY MEDICINE

## 2020-09-08 PROCEDURE — 1036F TOBACCO NON-USER: CPT | Performed by: FAMILY MEDICINE

## 2020-09-08 PROCEDURE — G8427 DOCREV CUR MEDS BY ELIG CLIN: HCPCS | Performed by: FAMILY MEDICINE

## 2020-09-08 PROCEDURE — 99214 OFFICE O/P EST MOD 30 MIN: CPT | Performed by: FAMILY MEDICINE

## 2020-09-08 RX ORDER — AMLODIPINE BESYLATE 5 MG/1
5 TABLET ORAL DAILY
Qty: 30 TABLET | Refills: 5 | Status: SHIPPED | OUTPATIENT
Start: 2020-09-08 | End: 2020-10-21 | Stop reason: SDUPTHER

## 2020-09-08 RX ORDER — HYDROCHLOROTHIAZIDE 12.5 MG/1
12.5 TABLET ORAL DAILY
Qty: 30 TABLET | Refills: 5 | Status: SHIPPED | OUTPATIENT
Start: 2020-09-08 | End: 2021-02-08 | Stop reason: SDUPTHER

## 2020-09-08 ASSESSMENT — ENCOUNTER SYMPTOMS
GASTROINTESTINAL NEGATIVE: 1
EYES NEGATIVE: 1
ALLERGIC/IMMUNOLOGIC NEGATIVE: 1
RESPIRATORY NEGATIVE: 1

## 2020-09-08 NOTE — PROGRESS NOTES
SUBJECTIVE:    Patient ID: Kamini Robertson is a 37 y.o.male. HPI:   Patient here for follow-up of multiple medical problems. Patient is a 31-year-old -American male that have mental disabilities and lives in a group home. He developed angioedema on the face and was taken to urgent care. He was diagnosed with ACE inhibitor induced angioedema. Medication was discontinued. He was placed on Norvasc and HCTZ. Blood pressure today is well controlled. He is doing well. Swelling have resolved. He also have chronic left knee pain. He has been having worsening pain for the last couple of months. He is taking ibuprofen. He also is obese. He has been having a hard time decreasing weight. He is not following any time with diet. He wants to get help losing weight. Past Medical History:   Diagnosis Date    Allergic rhinitis     Episodic mood disorder ([facility] [address] [facility]     Hyperlipidemia     Hypertension     Mild intellectual disabilities     Morbid obesity with BMI of 40.0-44.9, adult ([facility]) 9/18/2019    Schizophrenia ([facility])       Current Outpatient Medications   Medication Sig Dispense Refill    hydroCHLOROthiazide (HYDRODIURIL) 12.5 MG tablet Take 1 tablet by mouth daily 30 tablet 5    amLODIPine (NORVASC) 5 MG tablet Take 1 tablet by mouth daily 30 tablet 5    benzonatate (TESSALON) 200 MG capsule Take 200 mg by mouth 3 times daily as needed for Cough      loratadine (CLARITIN) 10 MG tablet GIVE ONE TABLET BY MOUTH ONCE DAILY.   DX: ALLERGIES 30 tablet 11    calcium carbonate (OSCAL) 500 MG TABS tablet Take 500 mg by mouth daily      omeprazole (PRILOSEC) 20 MG delayed release capsule Take 1 capsule by mouth every morning (before breakfast) 30 capsule 5    ibuprofen (ADVIL;MOTRIN) 800 MG tablet Take 800 mg by mouth every 6 hours as needed for Pain      olopatadine (PATADAY) 0.2 % SOLN ophthalmic solution 1 drop daily      polyethylene glycol (GLYCOLAX) powder Take 17 g by mouth daily as needed 1 Bottle 5    QUEtiapine (SEROQUEL) 200 MG tablet Take 200 mg by mouth Daily      glucose blood VI test strips (EXACTECH TEST) strip DX: 250.00  Test once daily 100 each 3    glucose monitoring kit (FREESTYLE) monitoring kit Test once daily  .00 1 kit 0    benztropine (COGENTIN) 2 MG tablet Take 2 mg by mouth 2 times daily.  divalproex (DEPAKOTE) 500 MG ER tablet Take 500 mg by mouth 3 times daily       risperiDONE (RISPERDAL) 2 MG tablet Take 3 mg by mouth Take 2 tablets at bedtime      acetaminophen (TYLENOL) 500 MG tablet Take 1,000 mg by mouth every 4 hours as needed for Pain.  loperamide (IMODIUM) 2 MG capsule Take 2 mg by mouth 4 times daily as needed for Diarrhea. No current facility-administered medications for this visit. Allergies   Allergen Reactions    Citalopram     Lexapro [Escitalopram Oxalate]     Seasonal     Serotonin Reuptake Inhibitors (Ssris)        Review of Systems   Constitutional: Negative. HENT: Negative. Eyes: Negative. Respiratory: Negative. Cardiovascular: Negative. Gastrointestinal: Negative. Endocrine: Negative. Genitourinary: Negative. Musculoskeletal: Negative. Skin: Negative. Allergic/Immunologic: Negative. Neurological: Negative. Hematological: Negative. Psychiatric/Behavioral: Negative. OBJECTIVE:    Physical Exam  Vitals signs reviewed. Constitutional:       Appearance: Normal appearance. He is well-developed. HENT:      Head: Normocephalic and atraumatic. Right Ear: Tympanic membrane, ear canal and external ear normal. There is no impacted cerumen. Left Ear: Tympanic membrane, ear canal and external ear normal. There is no impacted cerumen. Nose: Nose normal.      Mouth/Throat:      Lips: Pink. Mouth: Mucous membranes are moist.      Dentition: Normal dentition. Tongue: No lesions. Pharynx: Oropharynx is clear. Uvula midline.       Tonsils: No tonsillar exudate or symmetric. Psychiatric:         Attention and Perception: Attention normal.         Mood and Affect: Mood normal.         Speech: Speech normal.         Behavior: Behavior normal.         Thought Content: Thought content normal.         Cognition and Memory: Memory normal.         Judgment: Judgment normal.        /82   Pulse 102   Temp 97.3 °F (36.3 °C)   Wt 252 lb (114.3 kg)   SpO2 97%   BMI 41.93 kg/m²      ASSESSMENT:     Diagnosis Orders   1. Essential hypertension-well controlled hydroCHLOROthiazide (HYDRODIURIL) 12.5 MG tablet    amLODIPine (NORVASC) 5 MG tablet   2. Adverse effect of angiotensin-converting enzyme inhibitor, subsequent encounter- resolve    3. Chronic pain of left knee- osteoarthritis versus soft tissue Garret Morataya MD, Orthopaedic Surgery, [place]   4. Obesity due to excess calories, unspecified classification, unspecified whether serious comorbidity present  External Referral To Nutrition Services        PLAN:    1. HCTZ and Norvasc. 2.  Discontinue lisinopril. 3.  Refer to orthopedic surgery. 4.  Refer to a dietitian.   Follow-up 6 months

## 2020-09-21 ENCOUNTER — OFFICE VISIT (OUTPATIENT)
Dept: FAMILY MEDICINE CLINIC | Age: 35
End: 2020-09-21
Payer: MEDICARE

## 2020-09-21 VITALS
TEMPERATURE: 97.4 F | WEIGHT: 251 LBS | BODY MASS INDEX: 41.77 KG/M2 | DIASTOLIC BLOOD PRESSURE: 82 MMHG | HEART RATE: 102 BPM | OXYGEN SATURATION: 96 % | SYSTOLIC BLOOD PRESSURE: 132 MMHG

## 2020-09-21 DIAGNOSIS — Z00.00 WELLNESS EXAMINATION: ICD-10-CM

## 2020-09-21 LAB
ALBUMIN SERPL-MCNC: 4.2 G/DL (ref 3.5–5.2)
ALP BLD-CCNC: 58 U/L (ref 40–130)
ALT SERPL-CCNC: 14 U/L (ref 5–41)
ANION GAP SERPL CALCULATED.3IONS-SCNC: 16 MMOL/L (ref 7–19)
AST SERPL-CCNC: 18 U/L (ref 5–40)
BILIRUB SERPL-MCNC: <0.2 MG/DL (ref 0.2–1.2)
BILIRUBIN URINE: NEGATIVE
BLOOD, URINE: NEGATIVE
BUN BLDV-MCNC: 12 MG/DL (ref 6–20)
CALCIUM SERPL-MCNC: 9.7 MG/DL (ref 8.6–10)
CHLORIDE BLD-SCNC: 97 MMOL/L (ref 98–111)
CHOLESTEROL, TOTAL: 203 MG/DL (ref 160–199)
CLARITY: CLEAR
CO2: 27 MMOL/L (ref 22–29)
COLOR: YELLOW
CREAT SERPL-MCNC: 0.9 MG/DL (ref 0.5–1.2)
GFR AFRICAN AMERICAN: >59
GFR NON-AFRICAN AMERICAN: >60
GLUCOSE BLD-MCNC: 115 MG/DL (ref 74–109)
GLUCOSE URINE: NEGATIVE MG/DL
HBA1C MFR BLD: 6 % (ref 4–6)
HCT VFR BLD CALC: 45.7 % (ref 42–52)
HDLC SERPL-MCNC: 50 MG/DL (ref 55–121)
HEMOGLOBIN: 14.3 G/DL (ref 14–18)
KETONES, URINE: NEGATIVE MG/DL
LDL CHOLESTEROL CALCULATED: 110 MG/DL
LEUKOCYTE ESTERASE, URINE: NEGATIVE
MCH RBC QN AUTO: 24.6 PG (ref 27–31)
MCHC RBC AUTO-ENTMCNC: 31.3 G/DL (ref 33–37)
MCV RBC AUTO: 78.5 FL (ref 80–94)
NITRITE, URINE: NEGATIVE
PDW BLD-RTO: 17.1 % (ref 11.5–14.5)
PH UA: 6.5 (ref 5–8)
PLATELET # BLD: 319 K/UL (ref 130–400)
PMV BLD AUTO: 11.3 FL (ref 9.4–12.4)
POTASSIUM SERPL-SCNC: 3.8 MMOL/L (ref 3.5–5)
PROTEIN UA: NEGATIVE MG/DL
RBC # BLD: 5.82 M/UL (ref 4.7–6.1)
SODIUM BLD-SCNC: 140 MMOL/L (ref 136–145)
SPECIFIC GRAVITY UA: 1.02 (ref 1–1.03)
TOTAL PROTEIN: 8 G/DL (ref 6.6–8.7)
TRIGL SERPL-MCNC: 214 MG/DL (ref 0–149)
UROBILINOGEN, URINE: 0.2 E.U./DL
WBC # BLD: 5.1 K/UL (ref 4.8–10.8)

## 2020-09-21 PROCEDURE — G0439 PPPS, SUBSEQ VISIT: HCPCS | Performed by: FAMILY MEDICINE

## 2020-09-21 RX ORDER — MELOXICAM 15 MG/1
15 TABLET ORAL DAILY
COMMUNITY

## 2020-09-21 ASSESSMENT — ENCOUNTER SYMPTOMS
EYES NEGATIVE: 1
ALLERGIC/IMMUNOLOGIC NEGATIVE: 1
RESPIRATORY NEGATIVE: 1
GASTROINTESTINAL NEGATIVE: 1

## 2020-09-21 NOTE — PROGRESS NOTES
SUBJECTIVE:    Patient ID: Josue Doherty is a 37 y.o.male. HPI:   Patient here for wellness check. Patient a 81-year-old -American male. He lives in a group home secondary to mental disabilities. He is here with his staff member. He has mental disabilities. His legal guardians is his mother. Patient is morbid obese. He would like to go see a dietitian. Is even considering gastric bypass surgery. I am not sure he will qualify for bariatric surgery. Will recommend to start with dietitian consult. He is due for blood work. He has some glucose intolerance. He have mental illness and see psychiatry. He have a diagnosis of schizophrenia. He is on multiple antipsychotics. Past Medical History:   Diagnosis Date    Allergic rhinitis     Episodic mood disorder ([facility] [address]     Hyperlipidemia     Hypertension     Mild intellectual disabilities     Morbid obesity with BMI of 40.0-44.9, adult ([facility]) 9/18/2019    Schizophrenia ([facility])       Current Outpatient Medications   Medication Sig Dispense Refill    meloxicam (MOBIC) 15 MG tablet Take 15 mg by mouth daily      hydroCHLOROthiazide (HYDRODIURIL) 12.5 MG tablet Take 1 tablet by mouth daily 30 tablet 5    amLODIPine (NORVASC) 5 MG tablet Take 1 tablet by mouth daily 30 tablet 5    benzonatate (TESSALON) 200 MG capsule Take 200 mg by mouth 3 times daily as needed for Cough      loratadine (CLARITIN) 10 MG tablet GIVE ONE TABLET BY MOUTH ONCE DAILY.   DX: ALLERGIES 30 tablet 11    calcium carbonate (OSCAL) 500 MG TABS tablet Take 500 mg by mouth daily      omeprazole (PRILOSEC) 20 MG delayed release capsule Take 1 capsule by mouth every morning (before breakfast) 30 capsule 5    ibuprofen (ADVIL;MOTRIN) 800 MG tablet Take 800 mg by mouth every 6 hours as needed for Pain      olopatadine (PATADAY) 0.2 % SOLN ophthalmic solution 1 drop daily      polyethylene glycol (GLYCOLAX) powder Take 17 g by mouth daily as needed 1 Bottle 5    QUEtiapine (SEROQUEL) 200 MG tablet Take 200 mg by mouth Daily      glucose blood VI test strips (EXACTECH TEST) strip DX: 250.00  Test once daily 100 each 3    glucose monitoring kit (FREESTYLE) monitoring kit Test once daily  .00 1 kit 0    benztropine (COGENTIN) 2 MG tablet Take 2 mg by mouth 2 times daily.  divalproex (DEPAKOTE) 500 MG ER tablet Take 500 mg by mouth 3 times daily       risperiDONE (RISPERDAL) 2 MG tablet Take 3 mg by mouth Take 2 tablets at bedtime      acetaminophen (TYLENOL) 500 MG tablet Take 1,000 mg by mouth every 4 hours as needed for Pain.  loperamide (IMODIUM) 2 MG capsule Take 2 mg by mouth 4 times daily as needed for Diarrhea. No current facility-administered medications for this visit. Allergies   Allergen Reactions    Citalopram     Lexapro [Escitalopram Oxalate]     Seasonal     Serotonin Reuptake Inhibitors (Ssris)        Review of Systems   Constitutional: Negative. HENT: Negative. Eyes: Negative. Respiratory: Negative. Cardiovascular: Negative. Gastrointestinal: Negative. Endocrine: Negative. Genitourinary: Negative. Musculoskeletal: Negative. Skin: Negative. Allergic/Immunologic: Negative. Neurological: Negative. Hematological: Negative. Psychiatric/Behavioral: Negative. OBJECTIVE:    Physical Exam  Vitals signs reviewed. Constitutional:       Appearance: Normal appearance. He is well-developed. He is obese. HENT:      Head: Normocephalic and atraumatic. Right Ear: Tympanic membrane, ear canal and external ear normal. There is no impacted cerumen. Left Ear: Tympanic membrane, ear canal and external ear normal. There is no impacted cerumen. Nose: Nose normal.      Mouth/Throat:      Lips: Pink. Mouth: Mucous membranes are moist.      Dentition: Normal dentition. Tongue: No lesions. Pharynx: Oropharynx is clear. Uvula midline.       Tonsils: No tonsillar exudate or Psychiatric:         Attention and Perception: Attention normal.         Mood and Affect: Mood normal.         Speech: Speech normal.         Behavior: Behavior normal.         Thought Content: Thought content normal.         Cognition and Memory: Memory normal.         Judgment: Judgment normal.        /82   Pulse 102   Temp 97.4 °F (36.3 °C) (Temporal)   Wt 251 lb (113.9 kg)   SpO2 96%   BMI 41.77 kg/m²      ASSESSMENT:     Diagnosis Orders   1. Wellness examination  CBC    Hemoglobin A1C    Lipid Panel    Urinalysis    Comprehensive Metabolic Panel   2. Obesity due to excess calories with serious comorbidity, unspecified classification-no control External Referral To Nutrition Services   3. Murmur, heart-need to investigate         PLAN:    1. Obtain blood work today. 2.  Refer to a dietitian to encourage diet and exercise. 3.  2D echo.   Follow-up 6 months or earlier PRN

## 2020-10-21 ENCOUNTER — OFFICE VISIT (OUTPATIENT)
Dept: FAMILY MEDICINE CLINIC | Age: 35
End: 2020-10-21
Payer: MEDICARE

## 2020-10-21 VITALS
DIASTOLIC BLOOD PRESSURE: 80 MMHG | HEART RATE: 116 BPM | RESPIRATION RATE: 16 BRPM | BODY MASS INDEX: 42.32 KG/M2 | OXYGEN SATURATION: 98 % | TEMPERATURE: 98.2 F | SYSTOLIC BLOOD PRESSURE: 128 MMHG | HEIGHT: 65 IN | WEIGHT: 254 LBS

## 2020-10-21 PROCEDURE — G8427 DOCREV CUR MEDS BY ELIG CLIN: HCPCS | Performed by: FAMILY MEDICINE

## 2020-10-21 PROCEDURE — G8417 CALC BMI ABV UP PARAM F/U: HCPCS | Performed by: FAMILY MEDICINE

## 2020-10-21 PROCEDURE — 99213 OFFICE O/P EST LOW 20 MIN: CPT | Performed by: FAMILY MEDICINE

## 2020-10-21 PROCEDURE — G8484 FLU IMMUNIZE NO ADMIN: HCPCS | Performed by: FAMILY MEDICINE

## 2020-10-21 PROCEDURE — 1036F TOBACCO NON-USER: CPT | Performed by: FAMILY MEDICINE

## 2020-10-21 RX ORDER — AMLODIPINE BESYLATE 10 MG/1
10 TABLET ORAL DAILY
Qty: 30 TABLET | Refills: 2 | Status: SHIPPED | OUTPATIENT
Start: 2020-10-21 | End: 2020-12-08 | Stop reason: SDUPTHER

## 2020-10-21 NOTE — PROGRESS NOTES
Dylan Israel is a 37 y.o. male who presents today for   Chief Complaint   Patient presents with    Hypertension     running a little high       Chief Complaint: Hypertension    HPI  Patient has a history of motor hypertension reports that his blood pressures been running a little high but not any problems or symptoms associated with his elevated blood pressure. He states that when he checked it this morning it was running above 140/80 and has been a lot more recently. He has been taking his blood pressure medication regularly and denies any issues with use of his medication. In discussion it does appear that he had some lip swelling previously while he was on lisinopril that was presumed to be angioedema. Review of Systems   Constitutional: Negative for activity change, appetite change, fatigue and fever. HENT: Negative for congestion, rhinorrhea and sore throat. Eyes: Negative for pain and discharge. Respiratory: Negative for cough and shortness of breath. Cardiovascular: Negative for chest pain and palpitations. Gastrointestinal: Negative for abdominal pain, constipation, diarrhea, nausea and vomiting. Endocrine: Negative for cold intolerance and heat intolerance. Genitourinary: Negative for dysuria and hematuria. Musculoskeletal: Negative for back pain, gait problem, myalgias and neck pain. Skin: Negative for rash and wound.        Past Medical History:   Diagnosis Date    Allergic rhinitis     Episodic mood disorder ([facility] [address]     Hyperlipidemia     Hypertension     Mild intellectual disabilities     Morbid obesity with BMI of 40.0-44.9, adult ([facility]) 9/18/2019    Schizophrenia ([facility])        Current Outpatient Medications   Medication Sig Dispense Refill    amLODIPine (NORVASC) 10 MG tablet Take 1 tablet by mouth daily 30 tablet 2    meloxicam (MOBIC) 15 MG tablet Take 15 mg by mouth daily      hydroCHLOROthiazide (HYDRODIURIL) 12.5 MG tablet Take 1 tablet by mouth daily 30 tablet 5    benzonatate (TESSALON) 200 MG capsule Take 200 mg by mouth 3 times daily as needed for Cough      loratadine (CLARITIN) 10 MG tablet GIVE ONE TABLET BY MOUTH ONCE DAILY. DX: ALLERGIES 30 tablet 11    calcium carbonate (OSCAL) 500 MG TABS tablet Take 500 mg by mouth daily      omeprazole (PRILOSEC) 20 MG delayed release capsule Take 1 capsule by mouth every morning (before breakfast) 30 capsule 5    ibuprofen (ADVIL;MOTRIN) 800 MG tablet Take 800 mg by mouth every 6 hours as needed for Pain      olopatadine (PATADAY) 0.2 % SOLN ophthalmic solution 1 drop daily      polyethylene glycol (GLYCOLAX) powder Take 17 g by mouth daily as needed 1 Bottle 5    QUEtiapine (SEROQUEL) 200 MG tablet Take 200 mg by mouth Daily      glucose blood VI test strips (EXACTECH TEST) strip DX: 250.00  Test once daily 100 each 3    glucose monitoring kit (FREESTYLE) monitoring kit Test once daily  .00 1 kit 0    benztropine (COGENTIN) 2 MG tablet Take 2 mg by mouth 2 times daily.  divalproex (DEPAKOTE) 500 MG ER tablet Take 500 mg by mouth 3 times daily       risperiDONE (RISPERDAL) 2 MG tablet Take 3 mg by mouth Take 2 tablets at bedtime      acetaminophen (TYLENOL) 500 MG tablet Take 1,000 mg by mouth every 4 hours as needed for Pain.  loperamide (IMODIUM) 2 MG capsule Take 2 mg by mouth 4 times daily as needed for Diarrhea. No current facility-administered medications for this visit. Allergies   Allergen Reactions    Citalopram     Lexapro [Escitalopram Oxalate]     Seasonal     Serotonin Reuptake Inhibitors (Ssris)        No past surgical history on file.     Social History     Tobacco Use    Smoking status: Former Smoker    Smokeless tobacco: Never Used   Substance Use Topics    Alcohol use: No     Alcohol/week: 0.0 standard drinks    Drug use: No       Family History   Family history unknown: Yes       /80 (Site: Right Upper Arm, Position: Sitting, Cuff Size: Large Adult) Pulse 116   Temp 98.2 °F (36.8 °C) (Oral)   Resp 16   Ht 5' 5\" (1.651 m)   Wt 254 lb (115.2 kg)   SpO2 98%   BMI 42.27 kg/m²     Physical Exam  Vitals signs and nursing note reviewed. Constitutional:       General: He is not in acute distress. Appearance: Normal appearance. He is well-developed. He is not diaphoretic. HENT:      Head: Normocephalic and atraumatic. Right Ear: External ear normal.      Left Ear: External ear normal.   Eyes:      General: No scleral icterus. Right eye: No discharge. Left eye: No discharge. Conjunctiva/sclera: Conjunctivae normal.   Neck:      Musculoskeletal: Neck supple. No muscular tenderness. Trachea: No tracheal deviation. Cardiovascular:      Rate and Rhythm: Normal rate and regular rhythm. Heart sounds: Normal heart sounds. No murmur. No friction rub. No gallop. Pulmonary:      Effort: Pulmonary effort is normal. No respiratory distress. Breath sounds: Normal breath sounds. No wheezing or rales. Abdominal:      General: Bowel sounds are normal. There is no distension. Palpations: Abdomen is soft. Tenderness: There is no abdominal tenderness. Musculoskeletal:         General: No deformity (No gross deformities of upper or lower extremities) or signs of injury. Right lower leg: No edema. Left lower leg: No edema. Lymphadenopathy:      Cervical: No cervical adenopathy. Skin:     General: Skin is warm and dry. Findings: No erythema or rash. Neurological:      Mental Status: He is alert and oriented to person, place, and time. Cranial Nerves: No cranial nerve deficit. Psychiatric:         Behavior: Behavior normal.         Thought Content: Thought content normal.         Assessment:       ICD-10-CM    1. Essential hypertension  I10 amLODIPine (NORVASC) 10 MG tablet       Plan:  Discussed proper use of medication and continued monitoring.   Discussed need for follow-up if blood pressure continues remain elevated or if other problems occur. Discussed signs and symptoms requiring medical attention. All questions were answered and patient voiced understanding and agreement with plan as discussed. No orders of the defined types were placed in this encounter. Orders Placed This Encounter   Medications    amLODIPine (NORVASC) 10 MG tablet     Sig: Take 1 tablet by mouth daily     Dispense:  30 tablet     Refill:  2     Medications Discontinued During This Encounter   Medication Reason    amLODIPine (NORVASC) 5 MG tablet REORDER     Patient Instructions   Patient given educational handouts and has had all questions answered. Patient voices understanding and agrees to plans along with risks and benefits of plan. Patient is instructed to continue prior meds,diet, and exercise plans as instructed. Patient agrees to follow up as instructed and sooner if needed. Patient agrees to go to ER if condition becomes emergent. Return if symptoms worsen or fail to improve, for next scheduled follow up with PCP.

## 2020-11-08 ASSESSMENT — ENCOUNTER SYMPTOMS
ABDOMINAL PAIN: 0
BACK PAIN: 0
VOMITING: 0
EYE PAIN: 0
SHORTNESS OF BREATH: 0
CONSTIPATION: 0
NAUSEA: 0
DIARRHEA: 0
COUGH: 0
EYE DISCHARGE: 0
SORE THROAT: 0
RHINORRHEA: 0

## 2020-11-09 NOTE — PATIENT INSTRUCTIONS

## 2020-11-12 ENCOUNTER — TELEPHONE (OUTPATIENT)
Dept: FAMILY MEDICINE CLINIC | Age: 35
End: 2020-11-12

## 2020-11-12 NOTE — TELEPHONE ENCOUNTER
Kris Gauthier with [facility] called requesting an order for a dietician consult. Pt has one earlier this year, but did not want to do it. He has no reconsidered and is willing to see someone.  They need a referral

## 2020-12-08 RX ORDER — AMLODIPINE BESYLATE 10 MG/1
10 TABLET ORAL DAILY
Qty: 30 TABLET | Refills: 5 | Status: SHIPPED | OUTPATIENT
Start: 2020-12-08 | End: 2021-09-22

## 2021-02-08 DIAGNOSIS — I10 ESSENTIAL HYPERTENSION: ICD-10-CM

## 2021-02-08 RX ORDER — HYDROCHLOROTHIAZIDE 12.5 MG/1
12.5 TABLET ORAL DAILY
Qty: 30 TABLET | Refills: 11 | Status: SHIPPED | OUTPATIENT
Start: 2021-02-08 | End: 2021-09-22

## 2021-03-08 DIAGNOSIS — K21.9 GASTROESOPHAGEAL REFLUX DISEASE WITHOUT ESOPHAGITIS: ICD-10-CM

## 2021-03-08 RX ORDER — LORATADINE 10 MG/1
TABLET ORAL
Qty: 31 TABLET | Refills: 11 | Status: SHIPPED | OUTPATIENT
Start: 2021-03-08 | End: 2022-11-04

## 2021-03-08 RX ORDER — OMEPRAZOLE 20 MG/1
CAPSULE, DELAYED RELEASE ORAL
Qty: 31 CAPSULE | Refills: 11 | Status: SHIPPED | OUTPATIENT
Start: 2021-03-08 | End: 2021-09-22

## 2021-03-23 ENCOUNTER — OFFICE VISIT (OUTPATIENT)
Dept: FAMILY MEDICINE CLINIC | Age: 36
End: 2021-03-23
Payer: MEDICARE

## 2021-03-23 VITALS
SYSTOLIC BLOOD PRESSURE: 124 MMHG | OXYGEN SATURATION: 98 % | DIASTOLIC BLOOD PRESSURE: 72 MMHG | HEART RATE: 117 BPM | BODY MASS INDEX: 43.07 KG/M2 | TEMPERATURE: 97.2 F | WEIGHT: 258.8 LBS

## 2021-03-23 DIAGNOSIS — E66.09 OBESITY DUE TO EXCESS CALORIES WITH SERIOUS COMORBIDITY, UNSPECIFIED CLASSIFICATION: ICD-10-CM

## 2021-03-23 DIAGNOSIS — R73.02 GLUCOSE INTOLERANCE (IMPAIRED GLUCOSE TOLERANCE): ICD-10-CM

## 2021-03-23 DIAGNOSIS — I10 ESSENTIAL HYPERTENSION: Primary | ICD-10-CM

## 2021-03-23 DIAGNOSIS — R01.1 MURMUR, HEART: ICD-10-CM

## 2021-03-23 PROCEDURE — 1036F TOBACCO NON-USER: CPT | Performed by: FAMILY MEDICINE

## 2021-03-23 PROCEDURE — G8427 DOCREV CUR MEDS BY ELIG CLIN: HCPCS | Performed by: FAMILY MEDICINE

## 2021-03-23 PROCEDURE — 99214 OFFICE O/P EST MOD 30 MIN: CPT | Performed by: FAMILY MEDICINE

## 2021-03-23 PROCEDURE — G8417 CALC BMI ABV UP PARAM F/U: HCPCS | Performed by: FAMILY MEDICINE

## 2021-03-23 PROCEDURE — G8484 FLU IMMUNIZE NO ADMIN: HCPCS | Performed by: FAMILY MEDICINE

## 2021-03-23 ASSESSMENT — ENCOUNTER SYMPTOMS
RESPIRATORY NEGATIVE: 1
ALLERGIC/IMMUNOLOGIC NEGATIVE: 1
GASTROINTESTINAL NEGATIVE: 1
EYES NEGATIVE: 1

## 2021-03-23 NOTE — PROGRESS NOTES
SUBJECTIVE:    Patient ID: Lety Love is a 37 y.o.male. HPI:   Here for follow-up of multiple medical problems. Patient a 77-year-old -American male with mental disabilities. Patient here with care worker. Patient have hypertension. He takes blood pressure medicine. Blood pressure is well controlled. Denies medication side effect. He is also morbid obese have glucose intolerance. He also take antipsychotics. He is due for blood work. He is not exercising. Last time he was here he was found to have a heart murmur and 2D echo was performed. 2D echo was found to be normal limits       Past Medical History:   Diagnosis Date    Allergic rhinitis     Episodic mood disorder ([facility])     Hyperlipidemia     Hypertension     Mild intellectual disabilities     Morbid obesity with BMI of 40.0-44.9, adult ([facility] [address] 9/18/2019    Schizophrenia ([facility]       Current Outpatient Medications   Medication Sig Dispense Refill    omeprazole (PRILOSEC) 20 MG delayed release capsule GIVE ONE CAPSULE BY MOUTH EVERY MORNING (BEFORE BREAKFAST) 31 capsule 11    loratadine (CLARITIN) 10 MG tablet GIVE ONE TABLET BY MOUTH ONCE DAILY.   DX: ALLERGIES 31 tablet 11    hydroCHLOROthiazide (HYDRODIURIL) 12.5 MG tablet Take 1 tablet by mouth daily 30 tablet 11    amLODIPine (NORVASC) 10 MG tablet Take 1 tablet by mouth daily 30 tablet 5    meloxicam (MOBIC) 15 MG tablet Take 15 mg by mouth daily      benzonatate (TESSALON) 200 MG capsule Take 200 mg by mouth 3 times daily as needed for Cough      calcium carbonate (OSCAL) 500 MG TABS tablet Take 500 mg by mouth daily      ibuprofen (ADVIL;MOTRIN) 800 MG tablet Take 800 mg by mouth every 6 hours as needed for Pain      olopatadine (PATADAY) 0.2 % SOLN ophthalmic solution 1 drop daily      polyethylene glycol (GLYCOLAX) powder Take 17 g by mouth daily as needed 1 Bottle 5    QUEtiapine (SEROQUEL) 200 MG tablet Take 200 mg by mouth Daily      glucose blood VI test strips (EXACTECH TEST) strip DX: 250.00  Test once daily 100 each 3    glucose monitoring kit (FREESTYLE) monitoring kit Test once daily  .00 1 kit 0    benztropine (COGENTIN) 2 MG tablet Take 2 mg by mouth 2 times daily.  divalproex (DEPAKOTE) 500 MG ER tablet Take 500 mg by mouth 3 times daily       risperiDONE (RISPERDAL) 2 MG tablet Take 3 mg by mouth Take 2 tablets at bedtime      acetaminophen (TYLENOL) 500 MG tablet Take 1,000 mg by mouth every 4 hours as needed for Pain.  loperamide (IMODIUM) 2 MG capsule Take 2 mg by mouth 4 times daily as needed for Diarrhea. No current facility-administered medications for this visit. Allergies   Allergen Reactions    Citalopram     Lexapro [Escitalopram Oxalate]     Seasonal     Serotonin Reuptake Inhibitors (Ssris)        Review of Systems   Constitutional: Negative. HENT: Negative. Eyes: Negative. Respiratory: Negative. Cardiovascular: Negative. Gastrointestinal: Negative. Endocrine: Negative. Genitourinary: Negative. Musculoskeletal: Negative. Skin: Negative. Allergic/Immunologic: Negative. Neurological: Negative. Hematological: Negative. Psychiatric/Behavioral: Negative. OBJECTIVE:    Physical Exam  Vitals signs reviewed. Constitutional:       Appearance: Normal appearance. He is well-developed. He is obese. HENT:      Head: Normocephalic and atraumatic. Right Ear: Tympanic membrane, ear canal and external ear normal. There is no impacted cerumen. Left Ear: Tympanic membrane, ear canal and external ear normal. There is no impacted cerumen. Nose: Nose normal.      Mouth/Throat:      Lips: Pink. Mouth: Mucous membranes are moist.      Dentition: Normal dentition. Tongue: No lesions. Pharynx: Oropharynx is clear. Uvula midline. Tonsils: No tonsillar exudate or tonsillar abscesses. Eyes:      General: Lids are normal.         Right eye: No discharge. Left eye: No discharge. Extraocular Movements:      Right eye: Normal extraocular motion. Left eye: Normal extraocular motion. Conjunctiva/sclera: Conjunctivae normal.      Right eye: Right conjunctiva is not injected. Left eye: Left conjunctiva is not injected. Pupils: Pupils are equal, round, and reactive to light. Neck:      Musculoskeletal: Normal range of motion and neck supple. Thyroid: No thyromegaly. Vascular: No carotid bruit or JVD. Cardiovascular:      Rate and Rhythm: Normal rate and regular rhythm. Pulses:           Carotid pulses are 2+ on the right side and 2+ on the left side. Radial pulses are 2+ on the right side and 2+ on the left side. Heart sounds: S1 normal and S2 normal. Murmur present. Pulmonary:      Effort: Pulmonary effort is normal. No accessory muscle usage. Breath sounds: Normal breath sounds. Abdominal:      General: Bowel sounds are normal. There is no distension or abdominal bruit. Palpations: Abdomen is soft. There is no mass. Tenderness: There is no abdominal tenderness. Hernia: No hernia is present. Musculoskeletal: Normal range of motion. Right lower leg: No edema. Left lower leg: No edema. Lymphadenopathy:      Cervical:      Right cervical: No superficial cervical adenopathy. Left cervical: No superficial cervical adenopathy. Skin:     General: Skin is warm and dry. Coloration: Skin is not jaundiced or pale. Findings: No lesion or rash. Nails: There is no clubbing. Neurological:      Mental Status: He is alert and oriented to person, place, and time. Cranial Nerves: No facial asymmetry. Motor: No weakness or tremor. Coordination: Coordination normal.      Gait: Gait normal.      Deep Tendon Reflexes: Reflexes are normal and symmetric.    Psychiatric:         Attention and Perception: Attention normal.         Mood and Affect: Mood normal. Speech: Speech normal.         Behavior: Behavior normal.         Thought Content: Thought content normal.         Cognition and Memory: Memory normal.         Judgment: Judgment normal.        /72 (Site: Left Upper Arm, Position: Sitting, Cuff Size: Medium Adult)   Pulse 117   Temp 97.2 °F (36.2 °C) (Temporal)   Wt 258 lb 12.8 oz (117.4 kg)   SpO2 98%   BMI 43.07 kg/m²      ASSESSMENT:     Diagnosis Orders   1. Essential hypertensionwell-controlled CBC    Comprehensive Metabolic Panel    Hemoglobin A1C    Lipid Panel    Urinalysis   2. Glucose intolerance (impaired glucose tolerance)need blood work CBC    Comprehensive Metabolic Panel    Hemoglobin A1C    Lipid Panel    Urinalysis   3. Obesity due to excess calories with serious comorbidity, unspecified classificationongoing    4. Murmur, heartbenign         PLAN:    1. Continue medication obtain blood work. 2.  Encourage diet and exercise obtain blood work  3. Diet and exercise  4.  2D echo reviewed with patient today.   Follow-up 6 months

## 2021-04-01 DIAGNOSIS — I10 ESSENTIAL HYPERTENSION: ICD-10-CM

## 2021-04-01 DIAGNOSIS — R73.02 GLUCOSE INTOLERANCE (IMPAIRED GLUCOSE TOLERANCE): ICD-10-CM

## 2021-04-01 LAB
ALBUMIN SERPL-MCNC: 4.2 G/DL (ref 3.5–5.2)
ALP BLD-CCNC: 62 U/L (ref 40–130)
ALT SERPL-CCNC: 21 U/L (ref 5–41)
ANION GAP SERPL CALCULATED.3IONS-SCNC: 9 MMOL/L (ref 7–19)
AST SERPL-CCNC: 21 U/L (ref 5–40)
BILIRUB SERPL-MCNC: <0.2 MG/DL (ref 0.2–1.2)
BILIRUBIN URINE: NEGATIVE
BLOOD, URINE: NEGATIVE
BUN BLDV-MCNC: 15 MG/DL (ref 6–20)
CALCIUM SERPL-MCNC: 9.5 MG/DL (ref 8.6–10)
CHLORIDE BLD-SCNC: 100 MMOL/L (ref 98–111)
CHOLESTEROL, TOTAL: 205 MG/DL (ref 160–199)
CLARITY: CLEAR
CO2: 30 MMOL/L (ref 22–29)
COLOR: YELLOW
CREAT SERPL-MCNC: 0.8 MG/DL (ref 0.5–1.2)
GFR AFRICAN AMERICAN: >59
GFR NON-AFRICAN AMERICAN: >60
GLUCOSE BLD-MCNC: 106 MG/DL (ref 74–109)
GLUCOSE URINE: NEGATIVE MG/DL
HBA1C MFR BLD: 6 % (ref 4–6)
HCT VFR BLD CALC: 45.5 % (ref 42–52)
HDLC SERPL-MCNC: 49 MG/DL (ref 55–121)
HEMOGLOBIN: 14.1 G/DL (ref 14–18)
KETONES, URINE: NEGATIVE MG/DL
LDL CHOLESTEROL CALCULATED: 118 MG/DL
LEUKOCYTE ESTERASE, URINE: NEGATIVE
MCH RBC QN AUTO: 24.1 PG (ref 27–31)
MCHC RBC AUTO-ENTMCNC: 31 G/DL (ref 33–37)
MCV RBC AUTO: 77.6 FL (ref 80–94)
NITRITE, URINE: NEGATIVE
PDW BLD-RTO: 16.8 % (ref 11.5–14.5)
PH UA: 6.5 (ref 5–8)
PLATELET # BLD: 285 K/UL (ref 130–400)
PMV BLD AUTO: 10.7 FL (ref 9.4–12.4)
POTASSIUM SERPL-SCNC: 4.2 MMOL/L (ref 3.5–5)
PROTEIN UA: NEGATIVE MG/DL
RBC # BLD: 5.86 M/UL (ref 4.7–6.1)
SODIUM BLD-SCNC: 139 MMOL/L (ref 136–145)
SPECIFIC GRAVITY UA: 1.02 (ref 1–1.03)
TOTAL PROTEIN: 7.7 G/DL (ref 6.6–8.7)
TRIGL SERPL-MCNC: 191 MG/DL (ref 0–149)
UROBILINOGEN, URINE: 0.2 E.U./DL
WBC # BLD: 5.6 K/UL (ref 4.8–10.8)

## 2021-04-08 ENCOUNTER — TELEPHONE (OUTPATIENT)
Dept: FAMILY MEDICINE CLINIC | Age: 36
End: 2021-04-08

## 2021-04-08 NOTE — TELEPHONE ENCOUNTER
Pt came in last September with facial swelling. Dr. Yoko Zhong changed his BP medication and gave him benadryl to use prn. Benadryl rx needs to be renewed or D/C'd. Only used during that time.

## 2021-04-29 ENCOUNTER — OFFICE VISIT (OUTPATIENT)
Dept: FAMILY MEDICINE CLINIC | Age: 36
End: 2021-04-29
Payer: MEDICARE

## 2021-04-29 VITALS
OXYGEN SATURATION: 98 % | BODY MASS INDEX: 42.97 KG/M2 | TEMPERATURE: 98.8 F | DIASTOLIC BLOOD PRESSURE: 72 MMHG | SYSTOLIC BLOOD PRESSURE: 126 MMHG | HEART RATE: 97 BPM | WEIGHT: 258.2 LBS

## 2021-04-29 DIAGNOSIS — R35.1 NOCTURIA: ICD-10-CM

## 2021-04-29 DIAGNOSIS — E66.01 MORBID OBESITY WITH BMI OF 40.0-44.9, ADULT (HCC): ICD-10-CM

## 2021-04-29 DIAGNOSIS — F20.89 OTHER SCHIZOPHRENIA (HCC): ICD-10-CM

## 2021-04-29 DIAGNOSIS — R35.1 NOCTURIA: Primary | ICD-10-CM

## 2021-04-29 PROBLEM — E22.9 HYPERFUNCTION OF PITUITARY GLAND, UNSPECIFIED (HCC): Status: ACTIVE | Noted: 2021-04-29

## 2021-04-29 PROBLEM — E22.9 HYPERFUNCTION OF PITUITARY GLAND, UNSPECIFIED (HCC): Status: RESOLVED | Noted: 2021-04-29 | Resolved: 2021-04-29

## 2021-04-29 LAB
BILIRUBIN URINE: NEGATIVE
BLOOD, URINE: NEGATIVE
CLARITY: CLEAR
COLOR: YELLOW
GLUCOSE URINE: NEGATIVE MG/DL
KETONES, URINE: NEGATIVE MG/DL
LEUKOCYTE ESTERASE, URINE: NEGATIVE
NITRITE, URINE: NEGATIVE
PH UA: 7.5 (ref 5–8)
PROSTATE SPECIFIC ANTIGEN: 0.16 NG/ML (ref 0–4)
PROTEIN UA: NEGATIVE MG/DL
SPECIFIC GRAVITY UA: 1.01 (ref 1–1.03)
UROBILINOGEN, URINE: 1 E.U./DL

## 2021-04-29 PROCEDURE — 99214 OFFICE O/P EST MOD 30 MIN: CPT | Performed by: FAMILY MEDICINE

## 2021-04-29 PROCEDURE — G8417 CALC BMI ABV UP PARAM F/U: HCPCS | Performed by: FAMILY MEDICINE

## 2021-04-29 PROCEDURE — G8427 DOCREV CUR MEDS BY ELIG CLIN: HCPCS | Performed by: FAMILY MEDICINE

## 2021-04-29 PROCEDURE — 1036F TOBACCO NON-USER: CPT | Performed by: FAMILY MEDICINE

## 2021-04-29 ASSESSMENT — PATIENT HEALTH QUESTIONNAIRE - PHQ9
SUM OF ALL RESPONSES TO PHQ9 QUESTIONS 1 & 2: 0
SUM OF ALL RESPONSES TO PHQ QUESTIONS 1-9: 0
SUM OF ALL RESPONSES TO PHQ QUESTIONS 1-9: 0

## 2021-04-29 ASSESSMENT — ENCOUNTER SYMPTOMS
ALLERGIC/IMMUNOLOGIC NEGATIVE: 1
GASTROINTESTINAL NEGATIVE: 1
EYES NEGATIVE: 1
RESPIRATORY NEGATIVE: 1

## 2021-04-29 NOTE — PROGRESS NOTES
SUBJECTIVE:    Patient ID: Christo Burt is a 37 y.o.male. HPI:   Patient here for follow-up of multiple medical problems. Patient is a 72-year-old -American male. He complains of nocturia for the last couple of weeks. Denies any dysuria. Denies any frequency during the day. No new medications. He have intellectual disabilities. He also have schizophrenia. He sees psychiatry. Symptoms are stable. He is morbid obese. He is noncompliant with diet or exercise. Past Medical History:   Diagnosis Date    Allergic rhinitis     Episodic [facility] ([facility]     Hyperlipidemia     Hypertension     Mild intellectual disabilities     Morbid obesity with BMI of 40.0-44.9, adult ([facility] [address] 9/18/2019    [facility]       Current Outpatient Medications   Medication Sig Dispense Refill    Misc. Devices MISC D/C Benadryl 1 Device 0    omeprazole (PRILOSEC) 20 MG delayed release capsule GIVE ONE CAPSULE BY MOUTH EVERY MORNING (BEFORE BREAKFAST) 31 capsule 11    loratadine (CLARITIN) 10 MG tablet GIVE ONE TABLET BY MOUTH ONCE DAILY.   DX: ALLERGIES 31 tablet 11    hydroCHLOROthiazide (HYDRODIURIL) 12.5 MG tablet Take 1 tablet by mouth daily 30 tablet 11    amLODIPine (NORVASC) 10 MG tablet Take 1 tablet by mouth daily 30 tablet 5    meloxicam (MOBIC) 15 MG tablet Take 15 mg by mouth daily      benzonatate (TESSALON) 200 MG capsule Take 200 mg by mouth 3 times daily as needed for Cough      calcium carbonate (OSCAL) 500 MG TABS tablet Take 500 mg by mouth daily      ibuprofen (ADVIL;MOTRIN) 800 MG tablet Take 800 mg by mouth every 6 hours as needed for Pain      olopatadine (PATADAY) 0.2 % SOLN ophthalmic solution 1 drop daily      polyethylene glycol (GLYCOLAX) powder Take 17 g by mouth daily as needed 1 Bottle 5    QUEtiapine (SEROQUEL) 200 MG tablet Take 200 mg by mouth Daily      glucose blood VI test strips (EXACTECH TEST) strip DX: 250.00  Test once daily 100 each 3    glucose monitoring kit (FREESTYLE) monitoring kit Test once daily  .00 1 kit 0    benztropine (COGENTIN) 2 MG tablet Take 2 mg by mouth 2 times daily.  divalproex (DEPAKOTE) 500 MG ER tablet Take 500 mg by mouth 3 times daily       risperiDONE (RISPERDAL) 2 MG tablet Take 3 mg by mouth Take 2 tablets at bedtime      acetaminophen (TYLENOL) 500 MG tablet Take 1,000 mg by mouth every 4 hours as needed for Pain.  loperamide (IMODIUM) 2 MG capsule Take 2 mg by mouth 4 times daily as needed for Diarrhea. No current facility-administered medications for this visit. Allergies   Allergen Reactions    Citalopram     Lexapro [Escitalopram Oxalate]     Seasonal     Serotonin Reuptake Inhibitors (Ssris)        Review of Systems   Constitutional: Negative. HENT: Negative. Eyes: Negative. Respiratory: Negative. Cardiovascular: Negative. Gastrointestinal: Negative. Endocrine: Negative. Genitourinary: Positive for frequency. Musculoskeletal: Negative. Skin: Negative. Allergic/Immunologic: Negative. Neurological: Negative. Hematological: Negative. Psychiatric/Behavioral: Negative. OBJECTIVE:    Physical Exam  Vitals signs reviewed. Constitutional:       Appearance: Normal appearance. He is well-developed. He is obese. HENT:      Head: Normocephalic and atraumatic. Right Ear: Tympanic membrane, ear canal and external ear normal. There is no impacted cerumen. Left Ear: Tympanic membrane, ear canal and external ear normal. There is no impacted cerumen. Nose: Nose normal.      Mouth/Throat:      Lips: Pink. Mouth: Mucous membranes are moist.      Dentition: Normal dentition. Tongue: No lesions. Pharynx: Oropharynx is clear. Uvula midline. Tonsils: No tonsillar exudate or tonsillar abscesses. Eyes:      General: Lids are normal.         Right eye: No discharge. Left eye: No discharge.       Extraocular Movements: Right eye: Normal extraocular motion. Left eye: Normal extraocular motion. Conjunctiva/sclera: Conjunctivae normal.      Right eye: Right conjunctiva is not injected. Left eye: Left conjunctiva is not injected. Pupils: Pupils are equal, round, and reactive to light. Neck:      Musculoskeletal: Normal range of motion and neck supple. Thyroid: No thyromegaly. Vascular: No carotid bruit or JVD. Cardiovascular:      Rate and Rhythm: Normal rate and regular rhythm. Pulses:           Carotid pulses are 2+ on the right side and 2+ on the left side. Radial pulses are 2+ on the right side and 2+ on the left side. Heart sounds: S1 normal and S2 normal. Murmur present. Pulmonary:      Effort: Pulmonary effort is normal. No accessory muscle usage. Breath sounds: Normal breath sounds. Abdominal:      General: Bowel sounds are normal. There is no distension or abdominal bruit. Palpations: Abdomen is soft. There is no mass. Tenderness: There is no abdominal tenderness. Hernia: No hernia is present. Musculoskeletal: Normal range of motion. Right lower leg: No edema. Left lower leg: No edema. Lymphadenopathy:      Cervical:      Right cervical: No superficial cervical adenopathy. Left cervical: No superficial cervical adenopathy. Skin:     General: Skin is warm and dry. Coloration: Skin is not jaundiced or pale. Findings: No lesion or rash. Nails: There is no clubbing. Neurological:      Mental Status: He is alert and oriented to person, place, and time. Cranial Nerves: No facial asymmetry. Motor: No weakness or tremor. Coordination: Coordination normal.      Gait: Gait normal.      Deep Tendon Reflexes: Reflexes are normal and symmetric.    Psychiatric:         Attention and Perception: Attention normal.         Mood and Affect: Mood normal.         Speech: Speech normal.         Behavior: Behavior normal. Thought Content: Thought content normal.         Cognition and Memory: Memory normal.         Judgment: Judgment normal.        /72 (Site: Left Upper Arm, Position: Sitting, Cuff Size: Medium Adult)   Pulse 97   Temp 98.8 °F (37.1 °C) (Temporal)   Wt 258 lb 3.2 oz (117.1 kg)   SpO2 98%   BMI 42.97 kg/m²      ASSESSMENT:     Diagnosis Orders   1. Nocturiaetiology unknown Urinalysis Reflex to Culture    PSA, Diagnostic   2. Other schizophrenia (HCC)ongoing    3. Morbid obesity with BMI of 40.0-44.9, adult (HCC)not controlled         PLAN:    1. Urine with reflex to culture. PSA. He had blood work recently. No signs of diabetes. May decrease caffeine and fluid after 5:00.  2.  Follow-up with psychiatry  3. Encourage diet and exercise.   Follow-up next scheduled visit or earlier as needed

## 2021-08-19 ENCOUNTER — TELEPHONE (OUTPATIENT)
Dept: FAMILY MEDICINE CLINIC | Age: 36
End: 2021-08-19

## 2021-08-19 DIAGNOSIS — I10 ESSENTIAL HYPERTENSION: Primary | ICD-10-CM

## 2021-08-19 RX ORDER — LOSARTAN POTASSIUM 50 MG/1
50 TABLET ORAL DAILY
Qty: 30 TABLET | Refills: 5 | Status: SHIPPED | OUTPATIENT
Start: 2021-08-19 | End: 2021-09-22

## 2021-08-19 NOTE — TELEPHONE ENCOUNTER
BP log and meds sent for review. Pt's has been having elevated BP's. Per Dr. Gwyn Desir, add cozaar 50 mg daily. 149.9

## 2021-09-22 ENCOUNTER — OFFICE VISIT (OUTPATIENT)
Dept: FAMILY MEDICINE CLINIC | Age: 36
End: 2021-09-22
Payer: MEDICARE

## 2021-09-22 VITALS
TEMPERATURE: 97.9 F | HEIGHT: 65 IN | BODY MASS INDEX: 44.52 KG/M2 | SYSTOLIC BLOOD PRESSURE: 124 MMHG | WEIGHT: 267.2 LBS | HEART RATE: 95 BPM | OXYGEN SATURATION: 97 % | DIASTOLIC BLOOD PRESSURE: 82 MMHG

## 2021-09-22 DIAGNOSIS — I10 ESSENTIAL HYPERTENSION: Primary | ICD-10-CM

## 2021-09-22 DIAGNOSIS — E66.01 MORBID OBESITY WITH BMI OF 40.0-44.9, ADULT (HCC): ICD-10-CM

## 2021-09-22 DIAGNOSIS — R73.02 GLUCOSE INTOLERANCE (IMPAIRED GLUCOSE TOLERANCE): ICD-10-CM

## 2021-09-22 PROCEDURE — 4004F PT TOBACCO SCREEN RCVD TLK: CPT | Performed by: FAMILY MEDICINE

## 2021-09-22 PROCEDURE — G8417 CALC BMI ABV UP PARAM F/U: HCPCS | Performed by: FAMILY MEDICINE

## 2021-09-22 PROCEDURE — G8427 DOCREV CUR MEDS BY ELIG CLIN: HCPCS | Performed by: FAMILY MEDICINE

## 2021-09-22 PROCEDURE — 99214 OFFICE O/P EST MOD 30 MIN: CPT | Performed by: FAMILY MEDICINE

## 2021-09-22 ASSESSMENT — ENCOUNTER SYMPTOMS
EYES NEGATIVE: 1
GASTROINTESTINAL NEGATIVE: 1
RESPIRATORY NEGATIVE: 1
ALLERGIC/IMMUNOLOGIC NEGATIVE: 1

## 2021-09-22 NOTE — PROGRESS NOTES
SUBJECTIVE:    Patient ID: Dylan Israel is a 31 y.o.male. HPI:   Here for checkup  Patient is a 79-year-old white male. He is morbid obese. He has mental disabilities and lives in a group home. He is is off of his most of his medications. He used to be on blood pressure medication. His blood pressure today is good without blood pressure medication. He sees mental health for his mental health issues. At this point he is doing well without medications. He have history of glucose intolerance. He had blood work in April. He is a smoker at this point. I encouraged him to continue to try to quit completely. He is smoking about half a pack a day. He has quit in the past multiple times       Past Medical History:   Diagnosis Date    Allergic rhinitis     Episodic mood disorder (HCC)     Hyperlipidemia     Hypertension     Mild intellectual disabilities     [facility] 40.0[facility]44.[facility]    Sch[facility]       Current Outpatient Medications   Medication Sig Dispense Refill    meloxicam (MOBIC) 15 MG tablet Take 15 mg by mouth daily      benzonatate (TESSALON) 200 MG capsule Take 200 mg by mouth 3 times daily as needed for Cough      ibuprofen (ADVIL;MOTRIN) 800 MG tablet Take 800 mg by mouth every 6 hours as needed for Pain      olopatadine (PATADAY) 0.2 % SOLN ophthalmic solution 1 drop daily      acetaminophen (TYLENOL) 500 MG tablet Take 1,000 mg by mouth every 4 hours as needed for Pain.  loperamide (IMODIUM) 2 MG capsule Take 2 mg by mouth 4 times daily as needed for Diarrhea.  loratadine (CLARITIN) 10 MG tablet GIVE ONE TABLET BY MOUTH ONCE DAILY. DX: ALLERGIES (Patient not taking: Reported on 9/22/2021) 31 tablet 11     No current facility-administered medications for this visit.       Allergies   Allergen Reactions    Citalopram     Lexapro [Escitalopram Oxalate]     Lisinopril Swelling    Seasonal     Serotonin Reuptake Inhibitors (Ssris)        Review of Systems   Constitutional: Negative. HENT: Negative. Eyes: Negative. Respiratory: Negative. Cardiovascular: Negative. Gastrointestinal: Negative. Endocrine: Negative. Genitourinary: Negative. Musculoskeletal: Negative. Skin: Negative. Allergic/Immunologic: Negative. Neurological: Negative. Hematological: Negative. Psychiatric/Behavioral: Negative. OBJECTIVE:    Physical Exam  Vitals reviewed. Constitutional:       Appearance: Normal appearance. He is well-developed. HENT:      Head: Normocephalic and atraumatic. Right Ear: Tympanic membrane, ear canal and external ear normal. There is no impacted cerumen. Left Ear: Tympanic membrane, ear canal and external ear normal. There is no impacted cerumen. Nose: Nose normal.      Mouth/Throat:      Lips: Pink. Mouth: Mucous membranes are moist.      Dentition: Normal dentition. Tongue: No lesions. Pharynx: Oropharynx is clear. Uvula midline. Tonsils: No tonsillar exudate or tonsillar abscesses. Eyes:      General: Lids are normal.         Right eye: No discharge. Left eye: No discharge. Extraocular Movements:      Right eye: Normal extraocular motion. Left eye: Normal extraocular motion. Conjunctiva/sclera: Conjunctivae normal.      Right eye: Right conjunctiva is not injected. Left eye: Left conjunctiva is not injected. Pupils: Pupils are equal, round, and reactive to light. Neck:      Thyroid: No thyromegaly. Vascular: No carotid bruit or JVD. Cardiovascular:      Rate and Rhythm: Normal rate and regular rhythm. Pulses:           Carotid pulses are 2+ on the right side and 2+ on the left side. Radial pulses are 2+ on the right side and 2+ on the left side. Heart sounds: Normal heart sounds, S1 normal and S2 normal. No murmur heard.      Pulmonary:      Effort: Pulmonary effort is normal. No accessory muscle usage.      Breath sounds: Normal breath sounds. Abdominal:      General: Bowel sounds are normal. There is no distension or abdominal bruit. Palpations: Abdomen is soft. There is no mass. Tenderness: There is no abdominal tenderness. Hernia: No hernia is present. Musculoskeletal:         General: Normal range of motion. Cervical back: Normal range of motion and neck supple. Right lower leg: No edema. Left lower leg: No edema. Lymphadenopathy:      Cervical:      Right cervical: No superficial cervical adenopathy. Left cervical: No superficial cervical adenopathy. Skin:     General: Skin is warm and dry. Coloration: Skin is not jaundiced or pale. Findings: No lesion or rash. Nails: There is no clubbing. Neurological:      Mental Status: He is alert and oriented to person, place, and time. Cranial Nerves: No facial asymmetry. Motor: No weakness or tremor. Coordination: Coordination normal.      Gait: Gait normal.      Deep Tendon Reflexes: Reflexes are normal and symmetric. Psychiatric:         Attention and Perception: Attention normal.         Mood and Affect: Mood normal.         Speech: Speech normal.         Behavior: Behavior normal.         Thought Content: Thought content normal.         Cognition and Memory: Memory normal.         Judgment: Judgment normal.        /82 (Site: Left Upper Arm, Position: Sitting, Cuff Size: Large Adult)   Pulse 95   Temp 97.9 °F (36.6 °C) (Temporal)   Ht 5' 5\" (1.651 m)   Wt 267 lb 3.2 oz (121.2 kg)   SpO2 97%   BMI 44.46 kg/m²      ASSESSMENT:     Diagnosis Orders   1. Essential hypertensionstable without medications    2. Morbid obesity with BMI of 40.0-44.9, adult (HCC)not controlled    3. Glucose intolerance (impaired glucose tolerance)stable         PLAN:    1. Encouraged to quit smoking. Monitor blood pressures  2. Encourage diet and exercise  3.   Courage diet and exercise  Follow-up 12 months

## 2021-10-11 ENCOUNTER — TELEPHONE (OUTPATIENT)
Dept: FAMILY MEDICINE CLINIC | Age: 36
End: 2021-10-11

## 2021-10-11 RX ORDER — AMLODIPINE BESYLATE 10 MG/1
10 TABLET ORAL DAILY
COMMUNITY

## 2021-10-11 RX ORDER — LOSARTAN POTASSIUM 100 MG/1
100 TABLET ORAL DAILY
Qty: 30 TABLET | Refills: 5 | Status: SHIPPED | OUTPATIENT
Start: 2021-10-11 | End: 2022-11-04

## 2021-10-11 RX ORDER — LOSARTAN POTASSIUM 50 MG/1
50 TABLET ORAL DAILY
COMMUNITY
End: 2021-10-11 | Stop reason: SDUPTHER

## 2021-10-11 NOTE — TELEPHONE ENCOUNTER
Fax from Wilberto Arambula at [facility] on [place] BP. Running high - blood pressure log attached. Dr. Bree Bowles reviewed and wants to increase losartan 50 mg daily to 100 mg daily. Rx sent.

## 2021-10-18 ENCOUNTER — TELEPHONE (OUTPATIENT)
Dept: FAMILY MEDICINE CLINIC | Age: 36
End: 2021-10-18

## 2021-10-18 DIAGNOSIS — F20.89 OTHER SCHIZOPHRENIA (HCC): Primary | ICD-10-CM

## 2021-10-18 DIAGNOSIS — F39 EPISODIC MOOD DISORDER (HCC): ICD-10-CM

## 2021-10-18 DIAGNOSIS — F70 MILD INTELLECTUAL DISABILITY: ICD-10-CM

## 2022-04-13 ENCOUNTER — OFFICE VISIT (OUTPATIENT)
Dept: FAMILY MEDICINE CLINIC | Age: 37
End: 2022-04-13
Payer: MEDICARE

## 2022-04-13 ENCOUNTER — HOSPITAL ENCOUNTER (OUTPATIENT)
Dept: GENERAL RADIOLOGY | Age: 37
Discharge: HOME | End: 2022-04-13
Payer: MEDICARE

## 2022-04-13 VITALS
BODY MASS INDEX: 44.48 KG/M2 | HEART RATE: 111 BPM | WEIGHT: 267 LBS | SYSTOLIC BLOOD PRESSURE: 130 MMHG | DIASTOLIC BLOOD PRESSURE: 84 MMHG | HEIGHT: 65 IN | TEMPERATURE: 98.1 F | OXYGEN SATURATION: 96 %

## 2022-04-13 DIAGNOSIS — M54.50 CHRONIC MIDLINE LOW BACK PAIN WITHOUT SCIATICA: Primary | ICD-10-CM

## 2022-04-13 DIAGNOSIS — F20.89 OTHER SCHIZOPHRENIA (HCC): ICD-10-CM

## 2022-04-13 DIAGNOSIS — E66.01 OBESITY, CLASS III, BMI 40-49.9 (MORBID OBESITY) (HCC): ICD-10-CM

## 2022-04-13 DIAGNOSIS — G89.29 CHRONIC MIDLINE LOW BACK PAIN WITHOUT SCIATICA: ICD-10-CM

## 2022-04-13 DIAGNOSIS — M54.50 CHRONIC MIDLINE LOW BACK PAIN WITHOUT SCIATICA: ICD-10-CM

## 2022-04-13 DIAGNOSIS — G89.29 CHRONIC MIDLINE LOW BACK PAIN WITHOUT SCIATICA: Primary | ICD-10-CM

## 2022-04-13 PROCEDURE — 72100 X-RAY EXAM L-S SPINE 2/3 VWS: CPT

## 2022-04-13 PROCEDURE — 99214 OFFICE O/P EST MOD 30 MIN: CPT | Performed by: FAMILY MEDICINE

## 2022-04-13 PROCEDURE — 4004F PT TOBACCO SCREEN RCVD TLK: CPT | Performed by: FAMILY MEDICINE

## 2022-04-13 PROCEDURE — G8417 CALC BMI ABV UP PARAM F/U: HCPCS | Performed by: FAMILY MEDICINE

## 2022-04-13 PROCEDURE — G8427 DOCREV CUR MEDS BY ELIG CLIN: HCPCS | Performed by: FAMILY MEDICINE

## 2022-04-13 RX ORDER — RISPERIDONE 2 MG/1
2 TABLET, ORALLY DISINTEGRATING ORAL 2 TIMES DAILY
COMMUNITY

## 2022-04-13 RX ORDER — LATANOPROST 50 UG/ML
1 SOLUTION/ DROPS OPHTHALMIC NIGHTLY
COMMUNITY

## 2022-04-13 RX ORDER — QUETIAPINE FUMARATE 300 MG/1
300 TABLET, FILM COATED ORAL 2 TIMES DAILY
COMMUNITY

## 2022-04-13 RX ORDER — DIVALPROEX SODIUM 500 MG/1
500 TABLET, DELAYED RELEASE ORAL 3 TIMES DAILY
COMMUNITY

## 2022-04-13 RX ORDER — OMEPRAZOLE 20 MG/1
20 CAPSULE, DELAYED RELEASE ORAL DAILY
COMMUNITY
End: 2022-10-25

## 2022-04-13 RX ORDER — HYDROCHLOROTHIAZIDE 12.5 MG/1
12.5 CAPSULE, GELATIN COATED ORAL DAILY
COMMUNITY
End: 2022-11-04

## 2022-04-13 ASSESSMENT — ENCOUNTER SYMPTOMS
GASTROINTESTINAL NEGATIVE: 1
RESPIRATORY NEGATIVE: 1
EYES NEGATIVE: 1
ALLERGIC/IMMUNOLOGIC NEGATIVE: 1
BACK PAIN: 1

## 2022-04-13 NOTE — PROGRESS NOTES
SUBJECTIVE:    Patient ID: Ashish Lin is a 31 y.o.male. HPI:   Patient here for evaluation of back pain  Patient is a 24-year-old -American male. He had mental disabilities and schizophrenia. He lives in a group home. He sees psychiatry for schizophrenia. He complains of low back pain. Back pain is localized just to his lower back. Denies any radiation. Denies any weight loss or loss control of bladder or stool. He is a poor historian. He works cleaning offices. He lives in a group home. Patient here with his caregiver. Past Medical History:   Diagnosis Date    Allergic rhinitis     Epis[facility] ([facility] [address]     Hyperlipidemia     Hypertension     Mild intellectual disabilities     Morbid obesity with BMI of 40.0-44.9, adult ([facility] 9/18/2019    Schizophrenia ([facility])       Current Outpatient Medications   Medication Sig Dispense Refill    divalproex (DEPAKOTE) 500 MG DR tablet Take 500 mg by mouth 3 times daily      hydroCHLOROthiazide (MICROZIDE) 12.5 MG capsule Take 12.5 mg by mouth daily      latanoprost (XALATAN) 0.005 % ophthalmic solution 1 drop nightly      omeprazole (PRILOSEC) 20 MG delayed release capsule Take 20 mg by mouth daily      QUEtiapine (SEROQUEL) 300 MG tablet Take 300 mg by mouth 2 times daily      risperiDONE (RISPERDAL M-TABS) 2 MG disintegrating tablet Take 2 mg by mouth 2 times daily      amLODIPine (NORVASC) 10 MG tablet Take 10 mg by mouth daily      losartan (COZAAR) 100 MG tablet Take 1 tablet by mouth daily 30 tablet 5    loratadine (CLARITIN) 10 MG tablet GIVE ONE TABLET BY MOUTH ONCE DAILY.   DX: ALLERGIES 31 tablet 11    benzonatate (TESSALON) 200 MG capsule Take 200 mg by mouth 3 times daily as needed for Cough       ibuprofen (ADVIL;MOTRIN) 800 MG tablet Take 800 mg by mouth every 6 hours as needed for Pain       loperamide (IMODIUM) 2 MG capsule Take 2 mg by mouth 4 times daily as needed for Diarrhea       meloxicam (MOBIC) 15 MG tablet Take 15 mg by mouth daily       olopatadine (PATADAY) 0.2 % SOLN ophthalmic solution 1 drop daily       acetaminophen (TYLENOL) 500 MG tablet Take 1,000 mg by mouth every 4 hours as needed for Pain        No current facility-administered medications for this visit. Allergies   Allergen Reactions    Citalopram     Lexapro [Escitalopram Oxalate]     Lisinopril Swelling    Seasonal     Serotonin Reuptake Inhibitors (Ssris)        Review of Systems   Constitutional: Negative. HENT: Negative. Eyes: Negative. Respiratory: Negative. Cardiovascular: Negative. Gastrointestinal: Negative. Endocrine: Negative. Genitourinary: Negative. Musculoskeletal: Positive for back pain. Skin: Negative. Allergic/Immunologic: Negative. Neurological: Negative. Hematological: Negative. Psychiatric/Behavioral: Negative. OBJECTIVE:    Physical Exam  Vitals reviewed. Constitutional:       Appearance: Normal appearance. He is well-developed. He is obese. HENT:      Head: Normocephalic and atraumatic. Right Ear: Tympanic membrane, ear canal and external ear normal. There is no impacted cerumen. Left Ear: Tympanic membrane, ear canal and external ear normal. There is no impacted cerumen. Nose: Nose normal.      Mouth/Throat:      Lips: Pink. Mouth: Mucous membranes are moist.      Dentition: Normal dentition. Tongue: No lesions. Pharynx: Oropharynx is clear. Uvula midline. Tonsils: No tonsillar exudate or tonsillar abscesses. Eyes:      General: Lids are normal.         Right eye: No discharge. Left eye: No discharge. Extraocular Movements:      Right eye: Normal extraocular motion. Left eye: Normal extraocular motion. Conjunctiva/sclera: Conjunctivae normal.      Right eye: Right conjunctiva is not injected. Left eye: Left conjunctiva is not injected. Pupils: Pupils are equal, round, and reactive to light. Neck:      Thyroid: No thyromegaly. Vascular: No carotid bruit or JVD. Cardiovascular:      Rate and Rhythm: Normal rate and regular rhythm. Pulses:           Carotid pulses are 2+ on the right side and 2+ on the left side. Radial pulses are 2+ on the right side and 2+ on the left side. Heart sounds: Normal heart sounds, S1 normal and S2 normal. No murmur heard. Pulmonary:      Effort: Pulmonary effort is normal. No accessory muscle usage. Breath sounds: Normal breath sounds. Abdominal:      General: Bowel sounds are normal. There is no distension or abdominal bruit. Palpations: Abdomen is soft. There is no mass. Tenderness: There is no abdominal tenderness. Hernia: No hernia is present. Musculoskeletal:         General: Normal range of motion. Cervical back: Normal range of motion and neck supple. Right lower leg: No edema. Left lower leg: No edema. Lymphadenopathy:      Cervical:      Right cervical: No superficial cervical adenopathy. Left cervical: No superficial cervical adenopathy. Skin:     General: Skin is warm and dry. Coloration: Skin is not jaundiced or pale. Findings: No lesion or rash. Nails: There is no clubbing. Neurological:      Mental Status: He is alert and oriented to person, place, and time. Cranial Nerves: No facial asymmetry. Motor: No weakness or tremor. Coordination: Coordination normal.      Gait: Gait normal.      Deep Tendon Reflexes: Reflexes are normal and symmetric. Psychiatric:         Attention and Perception: Attention normal.         Mood and Affect: Mood normal.         Speech: Speech normal.         Behavior: Behavior normal.         Thought Content:  Thought content normal.         Cognition and Memory: Memory normal.         Judgment: Judgment normal.        /84 (Site: Left Upper Arm, Position: Sitting, Cuff Size: Medium Adult)   Pulse 111   Temp 98.1 °F (36.7

## 2022-08-25 ENCOUNTER — OFFICE VISIT (OUTPATIENT)
Dept: PRIMARY CARE CLINIC | Age: 37
End: 2022-08-25
Payer: MEDICARE

## 2022-08-25 VITALS
HEART RATE: 96 BPM | WEIGHT: 257.8 LBS | SYSTOLIC BLOOD PRESSURE: 128 MMHG | RESPIRATION RATE: 18 BRPM | HEIGHT: 65 IN | BODY MASS INDEX: 42.95 KG/M2 | TEMPERATURE: 97.8 F | DIASTOLIC BLOOD PRESSURE: 86 MMHG | OXYGEN SATURATION: 97 %

## 2022-08-25 DIAGNOSIS — J02.0 STREP PHARYNGITIS: Primary | ICD-10-CM

## 2022-08-25 DIAGNOSIS — J02.9 SORE THROAT: ICD-10-CM

## 2022-08-25 LAB — S PYO AG THROAT QL: POSITIVE

## 2022-08-25 PROCEDURE — 87880 STREP A ASSAY W/OPTIC: CPT | Performed by: NURSE PRACTITIONER

## 2022-08-25 PROCEDURE — 99213 OFFICE O/P EST LOW 20 MIN: CPT | Performed by: NURSE PRACTITIONER

## 2022-08-25 PROCEDURE — G8417 CALC BMI ABV UP PARAM F/U: HCPCS | Performed by: NURSE PRACTITIONER

## 2022-08-25 PROCEDURE — G8427 DOCREV CUR MEDS BY ELIG CLIN: HCPCS | Performed by: NURSE PRACTITIONER

## 2022-08-25 PROCEDURE — 4004F PT TOBACCO SCREEN RCVD TLK: CPT | Performed by: NURSE PRACTITIONER

## 2022-08-25 RX ORDER — AMOXICILLIN AND CLAVULANATE POTASSIUM 875; 125 MG/1; MG/1
1 TABLET, FILM COATED ORAL 2 TIMES DAILY
Qty: 20 TABLET | Refills: 0 | Status: SHIPPED | OUTPATIENT
Start: 2022-08-25 | End: 2022-09-04

## 2022-08-25 ASSESSMENT — ENCOUNTER SYMPTOMS
SINUS PRESSURE: 0
TROUBLE SWALLOWING: 0
EYE DISCHARGE: 0
EYE PAIN: 0
CHEST TIGHTNESS: 0
STRIDOR: 0
COLOR CHANGE: 0
ABDOMINAL PAIN: 0
WHEEZING: 0
COUGH: 0
SORE THROAT: 1
SHORTNESS OF BREATH: 0
ABDOMINAL DISTENTION: 0

## 2022-08-25 NOTE — PROGRESS NOTES
[address] [facility] [place] [address]  Dept: [phone]  Dept Fax: [phone]  Loc: [phone]    Tito Tinsley is a 30 y.o. male who presents today for his medical conditions/complaints as noted below. Tito Tinsley is complaining of Pharyngitis        HPI:   Pharyngitis  Associated symptoms include a sore throat. Pertinent negatives include no abdominal pain, arthralgias, chest pain, chills, congestion, coughing, fatigue, fever, neck pain, numbness, rash or weakness. Fred Beckwith presents to office complaining of sore throat for 2 days. Denies fever or body aches. Denies OTC medication    Past Medical History:   Diagnosis Date    Allergic rhinitis     Episodic mood disorder ([facility] [address]     Hyperlipidemia     Hypertension     Mild intellectual disabilities     Morbid obesity with BMI of 40.0-44.9, adult ([facility] [address] 9/18/2019    Schizophrenia ([facility]        No past surgical history on file.     Family History   Family history unknown: Yes       Social History     Tobacco Use    Smoking status: Every Day     Packs/day: 0.50     Types: Cigarettes    Smokeless tobacco: Never   Substance Use Topics    Alcohol use: No     Alcohol/week: 0.0 standard drinks        Current Outpatient Medications   Medication Sig Dispense Refill    divalproex (DEPAKOTE) 500 MG DR tablet Take 500 mg by mouth 3 times daily      hydroCHLOROthiazide (MICROZIDE) 12.5 MG capsule Take 12.5 mg by mouth daily      latanoprost (XALATAN) 0.005 % ophthalmic solution 1 drop nightly      omeprazole (PRILOSEC) 20 MG delayed release capsule Take 20 mg by mouth daily      QUEtiapine (SEROQUEL) 300 MG tablet Take 300 mg by mouth 2 times daily      risperiDONE (RISPERDAL M-TABS) 2 MG disintegrating tablet Take 2 mg by mouth 2 times daily      amLODIPine (NORVASC) 10 MG tablet Take 10 mg by mouth daily      losartan (COZAAR) 100 MG tablet Take 1 tablet by mouth daily 30 tablet 5    loratadine (CLARITIN) 10 MG tablet GIVE ONE TABLET BY MOUTH ONCE DAILY. DX: ALLERGIES 31 tablet 11    meloxicam (MOBIC) 15 MG tablet Take 15 mg by mouth daily       benzonatate (TESSALON) 200 MG capsule Take 200 mg by mouth 3 times daily as needed for Cough       ibuprofen (ADVIL;MOTRIN) 800 MG tablet Take 800 mg by mouth every 6 hours as needed for Pain       olopatadine (PATADAY) 0.2 % SOLN ophthalmic solution 1 drop daily       acetaminophen (TYLENOL) 500 MG tablet Take 1,000 mg by mouth every 4 hours as needed for Pain       loperamide (IMODIUM) 2 MG capsule Take 2 mg by mouth 4 times daily as needed for Diarrhea        No current facility-administered medications for this visit. Allergies   Allergen Reactions    Citalopram     Lexapro [Escitalopram Oxalate]     Lisinopril Swelling    Seasonal     Serotonin Reuptake Inhibitors (Ssris)        Health Maintenance   Topic Date Due    Varicella vaccine (1 of 2 - 2-dose childhood series) Never done    HIV screen  Never done    Hepatitis C screen  Never done    DTaP/Tdap/Td vaccine (1 - Tdap) Never done    Pneumococcal 0-64 years Vaccine (2 - PCV) 09/20/2017    COVID-19 Vaccine (3 - Booster for Pfizer series) 08/29/2021    A1C test (Diabetic or Prediabetic)  04/01/2022    Depression Screen  04/29/2022    Flu vaccine (1) 09/01/2022    Hepatitis A vaccine  Aged Out    Hepatitis B vaccine  Aged Out    Hib vaccine  Aged Out    Meningococcal (ACWY) vaccine  Aged Out       Subjective:   Review of Systems   Constitutional:  Negative for chills, fatigue and fever. HENT:  Positive for sore throat. Negative for congestion, sinus pressure and trouble swallowing. Eyes:  Negative for pain and discharge. Respiratory:  Negative for cough, chest tightness, shortness of breath, wheezing and stridor. Cardiovascular:  Negative for chest pain and palpitations. Gastrointestinal:  Negative for abdominal distention and abdominal pain.    Genitourinary:  Negative for difficulty urinating, dysuria and hematuria. Musculoskeletal:  Negative for arthralgias, neck pain and neck stiffness. Skin:  Negative for color change and rash. Neurological:  Negative for dizziness, syncope, speech difficulty, weakness and numbness. Psychiatric/Behavioral:  Negative for confusion and suicidal ideas. Objective    Physical Exam  Vitals and nursing note reviewed. Constitutional:       General: He is not in acute distress. Appearance: Normal appearance. HENT:      Head: Normocephalic. Right Ear: Tympanic membrane, ear canal and external ear normal.      Left Ear: Tympanic membrane, ear canal and external ear normal.      Nose: Nose normal. No congestion. Mouth/Throat:      Mouth: Mucous membranes are moist.      Pharynx: Oropharyngeal exudate (white) and posterior oropharyngeal erythema (moderate erythema and swelling) present. Eyes:      Conjunctiva/sclera: Conjunctivae normal.      Pupils: Pupils are equal, round, and reactive to light. Cardiovascular:      Rate and Rhythm: Normal rate and regular rhythm. Pulses: Normal pulses. Heart sounds: Normal heart sounds. No murmur heard. Pulmonary:      Effort: Pulmonary effort is normal. No respiratory distress. Breath sounds: Normal breath sounds. No stridor. No wheezing. Abdominal:      General: Abdomen is flat. Bowel sounds are normal. There is no distension. Tenderness: There is no abdominal tenderness. Musculoskeletal:         General: No swelling or deformity. Normal range of motion. Cervical back: Normal range of motion. No rigidity or tenderness. Skin:     General: Skin is warm and dry. Findings: No rash. Neurological:      General: No focal deficit present. Mental Status: He is alert and oriented to person, place, and time. Sensory: No sensory deficit.        /86   Pulse 96   Temp 97.8 °F (36.6 °C) (Temporal)   Resp 18   Ht 5' 5\" (1.651 m)   Wt 257 lb 12.8 oz (116.9 kg)   SpO2 97%   BMI 42.90 kg/m²     Assessment         Diagnosis Orders   1. Sore throat  POCT rapid strep A          Plan     Orders Placed This Encounter   Procedures    POCT rapid strep A       No results found for this visit on 08/25/22. No orders of the defined types were placed in this encounter. New Prescriptions    No medications on file        No follow-ups on file. Discussed use, benefits, and side effects of any prescribed medications. All patient questions were answered. Patient voiced understanding of care plan. Patient was given educational materials - see patient instructions below. There are no Patient Instructions on file for this visit.       Electronically signed by AVELINO Lee CNP on 8/25/2022 at 11:35 AM

## 2022-08-25 NOTE — PATIENT INSTRUCTIONS
Encourage fluids  Strep test completed and was positive  Antibiotic sent to pharmacy. Change toothbrush after one full day on abx.    If symptoms worsen or fail to improve follow-up with office or PCP  If SOB, chest pain, or high persistent fevers occur, go to ER    Patient verbalized understanding and agrees to plan

## 2022-09-14 ENCOUNTER — OFFICE VISIT (OUTPATIENT)
Dept: PRIMARY CARE CLINIC | Age: 37
End: 2022-09-14
Payer: MEDICARE

## 2022-09-14 VITALS
RESPIRATION RATE: 14 BRPM | OXYGEN SATURATION: 98 % | BODY MASS INDEX: 40.85 KG/M2 | WEIGHT: 245.2 LBS | HEIGHT: 65 IN | HEART RATE: 75 BPM | TEMPERATURE: 97.3 F

## 2022-09-14 DIAGNOSIS — M25.562 ACUTE PAIN OF LEFT KNEE: Primary | ICD-10-CM

## 2022-09-14 PROCEDURE — 99213 OFFICE O/P EST LOW 20 MIN: CPT | Performed by: NURSE PRACTITIONER

## 2022-09-14 PROCEDURE — G8417 CALC BMI ABV UP PARAM F/U: HCPCS | Performed by: NURSE PRACTITIONER

## 2022-09-14 PROCEDURE — 4004F PT TOBACCO SCREEN RCVD TLK: CPT | Performed by: NURSE PRACTITIONER

## 2022-09-14 PROCEDURE — G8427 DOCREV CUR MEDS BY ELIG CLIN: HCPCS | Performed by: NURSE PRACTITIONER

## 2022-09-14 ASSESSMENT — ENCOUNTER SYMPTOMS
BLOOD IN STOOL: 0
SINUS PRESSURE: 0
VOMITING: 0
WHEEZING: 0
COUGH: 0
EYE DISCHARGE: 0
DIARRHEA: 0
EYE ITCHING: 0
COLOR CHANGE: 0
ABDOMINAL PAIN: 0
SHORTNESS OF BREATH: 0
NAUSEA: 0
SORE THROAT: 0
RHINORRHEA: 0
CONSTIPATION: 0

## 2022-09-14 NOTE — PROGRESS NOTES
mouth daily      QUEtiapine (SEROQUEL) 300 MG tablet Take 300 mg by mouth 2 times daily      risperiDONE (RISPERDAL M-TABS) 2 MG disintegrating tablet Take 2 mg by mouth 2 times daily      amLODIPine (NORVASC) 10 MG tablet Take 10 mg by mouth daily      losartan (COZAAR) 100 MG tablet Take 1 tablet by mouth daily 30 tablet 5    loratadine (CLARITIN) 10 MG tablet GIVE ONE TABLET BY MOUTH ONCE DAILY. DX: ALLERGIES 31 tablet 11    meloxicam (MOBIC) 15 MG tablet Take 15 mg by mouth daily       benzonatate (TESSALON) 200 MG capsule Take 200 mg by mouth 3 times daily as needed for Cough       ibuprofen (ADVIL;MOTRIN) 800 MG tablet Take 800 mg by mouth every 6 hours as needed for Pain       olopatadine (PATADAY) 0.2 % SOLN ophthalmic solution 1 drop daily       acetaminophen (TYLENOL) 500 MG tablet Take 1,000 mg by mouth every 4 hours as needed for Pain       loperamide (IMODIUM) 2 MG capsule Take 2 mg by mouth 4 times daily as needed for Diarrhea        No current facility-administered medications for this visit. Allergies   Allergen Reactions    Citalopram     Lexapro [Escitalopram Oxalate]     Lisinopril Swelling    Seasonal     Serotonin Reuptake Inhibitors (Ssris)        Health Maintenance   Topic Date Due    Varicella vaccine (1 of 2 - 2-dose childhood series) Never done    HIV screen  Never done    Hepatitis C screen  Never done    DTaP/Tdap/Td vaccine (1 - Tdap) Never done    Pneumococcal 0-64 years Vaccine (2 - PCV) 09/20/2017    COVID-19 Vaccine (3 - Booster for Pfizer series) 08/29/2021    A1C test (Diabetic or Prediabetic)  04/01/2022    Depression Screen  04/29/2022    Flu vaccine (1) 09/01/2022    Hepatitis A vaccine  Aged Out    Hepatitis B vaccine  Aged Out    Hib vaccine  Aged Out    Meningococcal (ACWY) vaccine  Aged Out       Subjective:   Review of Systems   Constitutional:  Negative for activity change, appetite change, chills, fatigue and fever.    HENT:  Negative for congestion, ear pain, rhinorrhea, sinus pressure and sore throat. Eyes:  Negative for discharge and itching. Respiratory:  Negative for cough, shortness of breath and wheezing. Cardiovascular:  Negative for chest pain. Gastrointestinal:  Negative for abdominal pain, blood in stool, constipation, diarrhea, nausea and vomiting. Musculoskeletal:  Positive for arthralgias (left knee pain/ swelling). Negative for myalgias. Skin:  Negative for color change and rash. Neurological:  Negative for dizziness, numbness and headaches. All other systems reviewed and are negative. Objective    Physical Exam  Vitals and nursing note reviewed. Constitutional:       General: He is not in acute distress. Appearance: Normal appearance. HENT:      Head: Normocephalic and atraumatic. Right Ear: Tympanic membrane and ear canal normal.      Left Ear: Tympanic membrane and ear canal normal.      Mouth/Throat:      Mouth: Mucous membranes are moist.      Pharynx: No posterior oropharyngeal erythema. Eyes:      Extraocular Movements: Extraocular movements intact. Pupils: Pupils are equal, round, and reactive to light. Cardiovascular:      Rate and Rhythm: Normal rate and regular rhythm. Pulses: Normal pulses. Heart sounds: Normal heart sounds. No murmur heard. Pulmonary:      Effort: Pulmonary effort is normal. No respiratory distress. Breath sounds: Normal breath sounds. No wheezing. Musculoskeletal:        Legs:       Comments: No tenderness upon palpation, no edema to the area. ROM normal. Reports mild pain upon ROM. Gait normal   Skin:     General: Skin is warm. Capillary Refill: Capillary refill takes less than 2 seconds. Coloration: Skin is not pale. Findings: No rash. Neurological:      General: No focal deficit present. Mental Status: He is alert and oriented to person, place, and time. Deep Tendon Reflexes: Reflexes are normal and symmetric.    Psychiatric: Attention and Perception: Attention normal.         Mood and Affect: Mood normal.         Behavior: Behavior normal. Behavior is cooperative. Thought Content: Thought content normal.       Pulse 75   Temp 97.3 °F (36.3 °C) (Temporal)   Resp 14   Ht 5' 5\" (1.651 m)   Wt 245 lb 3.2 oz (111.2 kg)   SpO2 98%   BMI 40.80 kg/m²     Assessment         Diagnosis Orders   1. Acute pain of left knee            Plan   -Use voltaren as needed up to 4x daily   -Use ice as needed - use only 15 minutes at a time  -Encouraged adequate rest  -Recommended elevation of the area  -Recommended compression of the area  -Patient may use ibuprofen or tylenol for pain  -The patient is to follow up with PCP or return to clinic if symptoms worsen/fail to improve. Orders Placed This Encounter   Medications    diclofenac sodium (VOLTAREN) 1 % GEL     Sig: Apply 4 g topically 4 times daily Apply to left knee area up to 4 times daily for pain and inflammation     Dispense:  100 g     Refill:  0      New Prescriptions    DICLOFENAC SODIUM (VOLTAREN) 1 % GEL    Apply 4 g topically 4 times daily Apply to left knee area up to 4 times daily for pain and inflammation        Return if symptoms worsen or fail to improve. Discussed use, benefits, and side effects of any prescribed medications. All patient questions were answered. Patient voiced understanding of care plan. Patient was given educational materials - see patient instructions below. Patient Instructions   -Use voltaren as needed up to 4x daily   -Use ice as needed - use only 15 minutes at a time  -Encouraged adequate rest  -Recommended elevation of the area  -Patient may use ibuprofen or tylenol for pain  -The patient is to follow up with PCP or return to clinic if symptoms worsen/fail to improve.         Electronically signed by Hulon Siemens, APRN - CNP on 9/14/2022 at 10:30 AM

## 2022-09-14 NOTE — PATIENT INSTRUCTIONS
-Use voltaren as needed up to 4x daily   -Use ice as needed - use only 15 minutes at a time  -Encouraged adequate rest  -Recommended elevation of the area  -Patient may use ibuprofen or tylenol for pain  -The patient is to follow up with PCP or return to clinic if symptoms worsen/fail to improve.

## 2022-09-22 ENCOUNTER — OFFICE VISIT (OUTPATIENT)
Dept: FAMILY MEDICINE CLINIC | Age: 37
End: 2022-09-22
Payer: MEDICARE

## 2022-09-22 VITALS
OXYGEN SATURATION: 99 % | SYSTOLIC BLOOD PRESSURE: 126 MMHG | BODY MASS INDEX: 40.44 KG/M2 | WEIGHT: 243 LBS | HEART RATE: 89 BPM | TEMPERATURE: 97.4 F | DIASTOLIC BLOOD PRESSURE: 76 MMHG

## 2022-09-22 DIAGNOSIS — I10 ESSENTIAL HYPERTENSION: ICD-10-CM

## 2022-09-22 DIAGNOSIS — R73.02 GLUCOSE INTOLERANCE (IMPAIRED GLUCOSE TOLERANCE): ICD-10-CM

## 2022-09-22 DIAGNOSIS — F70 MILD INTELLECTUAL DISABILITY: Primary | ICD-10-CM

## 2022-09-22 DIAGNOSIS — Z11.59 NEED FOR HEPATITIS C SCREENING TEST: ICD-10-CM

## 2022-09-22 DIAGNOSIS — Z11.4 ENCOUNTER FOR SCREENING FOR HIV: ICD-10-CM

## 2022-09-22 LAB
ALBUMIN SERPL-MCNC: 4.4 G/DL (ref 3.5–5.2)
ALP BLD-CCNC: 55 U/L (ref 40–130)
ALT SERPL-CCNC: 12 U/L (ref 5–41)
ANION GAP SERPL CALCULATED.3IONS-SCNC: 10 MMOL/L (ref 7–19)
AST SERPL-CCNC: 17 U/L (ref 5–40)
BILIRUB SERPL-MCNC: 0.3 MG/DL (ref 0.2–1.2)
BILIRUBIN URINE: NEGATIVE
BLOOD, URINE: NEGATIVE
BUN BLDV-MCNC: 11 MG/DL (ref 6–20)
CALCIUM SERPL-MCNC: 10.1 MG/DL (ref 8.6–10)
CHLORIDE BLD-SCNC: 96 MMOL/L (ref 98–111)
CHOLESTEROL, TOTAL: 169 MG/DL (ref 160–199)
CLARITY: CLEAR
CO2: 28 MMOL/L (ref 22–29)
COLOR: YELLOW
CREAT SERPL-MCNC: 0.8 MG/DL (ref 0.5–1.2)
GFR AFRICAN AMERICAN: >59
GFR NON-AFRICAN AMERICAN: >60
GLUCOSE BLD-MCNC: 88 MG/DL (ref 74–109)
GLUCOSE URINE: NEGATIVE MG/DL
HBA1C MFR BLD: 5.8 % (ref 4–6)
HCT VFR BLD CALC: 49.5 % (ref 42–52)
HDLC SERPL-MCNC: 53 MG/DL (ref 55–121)
HEMOGLOBIN: 15.2 G/DL (ref 14–18)
HEPATITIS C ANTIBODY INTERPRETATION: NORMAL
HIV-1 P24 AG: NORMAL
KETONES, URINE: NEGATIVE MG/DL
LDL CHOLESTEROL CALCULATED: 94 MG/DL
LEUKOCYTE ESTERASE, URINE: NEGATIVE
MCH RBC QN AUTO: 24.5 PG (ref 27–31)
MCHC RBC AUTO-ENTMCNC: 30.7 G/DL (ref 33–37)
MCV RBC AUTO: 79.8 FL (ref 80–94)
NITRITE, URINE: NEGATIVE
PDW BLD-RTO: 17.5 % (ref 11.5–14.5)
PH UA: 7.5 (ref 5–8)
PLATELET # BLD: 293 K/UL (ref 130–400)
PMV BLD AUTO: 10.4 FL (ref 9.4–12.4)
POTASSIUM SERPL-SCNC: 4.1 MMOL/L (ref 3.5–5)
PROTEIN UA: NEGATIVE MG/DL
RAPID HIV 1&2: NORMAL
RBC # BLD: 6.2 M/UL (ref 4.7–6.1)
SODIUM BLD-SCNC: 134 MMOL/L (ref 136–145)
SPECIFIC GRAVITY UA: 1.01 (ref 1–1.03)
TOTAL PROTEIN: 7.9 G/DL (ref 6.6–8.7)
TRIGL SERPL-MCNC: 112 MG/DL (ref 0–149)
TSH SERPL DL<=0.05 MIU/L-ACNC: 0.96 UIU/ML (ref 0.27–4.2)
UROBILINOGEN, URINE: 0.2 E.U./DL
WBC # BLD: 6.4 K/UL (ref 4.8–10.8)

## 2022-09-22 PROCEDURE — G8427 DOCREV CUR MEDS BY ELIG CLIN: HCPCS | Performed by: FAMILY MEDICINE

## 2022-09-22 PROCEDURE — G8417 CALC BMI ABV UP PARAM F/U: HCPCS | Performed by: FAMILY MEDICINE

## 2022-09-22 PROCEDURE — 99214 OFFICE O/P EST MOD 30 MIN: CPT | Performed by: FAMILY MEDICINE

## 2022-09-22 PROCEDURE — 4004F PT TOBACCO SCREEN RCVD TLK: CPT | Performed by: FAMILY MEDICINE

## 2022-09-22 ASSESSMENT — PATIENT HEALTH QUESTIONNAIRE - PHQ9
1. LITTLE INTEREST OR PLEASURE IN DOING THINGS: 1
SUM OF ALL RESPONSES TO PHQ9 QUESTIONS 1 & 2: 2
SUM OF ALL RESPONSES TO PHQ QUESTIONS 1-9: 2
2. FEELING DOWN, DEPRESSED OR HOPELESS: 1

## 2022-09-22 NOTE — PROGRESS NOTES
SUBJECTIVE:    Patient ID: Eben Roman is a 30 y. o.male. HPI:   Patient here for checkup  Patient is a 68-year-old -American male with mental disabilities that lives in a group home. He have hypertension. He takes blood pressure medicine. Blood pressure is well controlled today. He is due for blood work. He also have history of glucose intolerance. He does not follow any diet. He is obese. He does not smoke but he vapes. He does not exercise on a regular basis. Past Medical History:   Diagnosis Date    Allergic rhinitis     Episodic mood disorder ([facility] [address]     Hyperlipidemia     Hypertension     Mild intellectual disabilities     Morbid obesity with BMI of 40.0-44.9, adult ([facility] 9/18/2019    Schizophrenia ([facility])       Current Outpatient Medications   Medication Sig Dispense Refill    diclofenac sodium (VOLTAREN) 1 % GEL Apply 4 g topically 4 times daily Apply to left knee area up to 4 times daily for pain and inflammation 100 g 0    divalproex (DEPAKOTE) 500 MG DR tablet Take 500 mg by mouth 3 times daily      hydroCHLOROthiazide (MICROZIDE) 12.5 MG capsule Take 12.5 mg by mouth daily      latanoprost (XALATAN) 0.005 % ophthalmic solution 1 drop nightly      omeprazole (PRILOSEC) 20 MG delayed release capsule Take 20 mg by mouth daily      QUEtiapine (SEROQUEL) 300 MG tablet Take 300 mg by mouth 2 times daily      risperiDONE (RISPERDAL M-TABS) 2 MG disintegrating tablet Take 2 mg by mouth 2 times daily      amLODIPine (NORVASC) 10 MG tablet Take 10 mg by mouth daily      losartan (COZAAR) 100 MG tablet Take 1 tablet by mouth daily 30 tablet 5    loratadine (CLARITIN) 10 MG tablet GIVE ONE TABLET BY MOUTH ONCE DAILY.   DX: ALLERGIES 31 tablet 11    meloxicam (MOBIC) 15 MG tablet Take 15 mg by mouth daily       benzonatate (TESSALON) 200 MG capsule Take 200 mg by mouth 3 times daily as needed for Cough       ibuprofen (ADVIL;MOTRIN) 800 MG tablet Take 800 mg by mouth every 6 hours as needed for Pain       olopatadine (PATADAY) 0.2 % SOLN ophthalmic solution 1 drop daily       acetaminophen (TYLENOL) 500 MG tablet Take 1,000 mg by mouth every 4 hours as needed for Pain       loperamide (IMODIUM) 2 MG capsule Take 2 mg by mouth 4 times daily as needed for Diarrhea        No current facility-administered medications for this visit. Allergies   Allergen Reactions    Citalopram     Lexapro [Escitalopram Oxalate]     Lisinopril Swelling    Seasonal     Serotonin Reuptake Inhibitors (Ssris)        Review of Systems   Constitutional: Negative. HENT: Negative. Eyes: Negative. Respiratory: Negative. Cardiovascular: Negative. Gastrointestinal: Negative. Endocrine: Negative. Genitourinary: Negative. Musculoskeletal: Negative. Skin: Negative. Allergic/Immunologic: Negative. Neurological: Negative. Hematological: Negative. Psychiatric/Behavioral: Negative. OBJECTIVE:    Physical Exam  Vitals reviewed. Constitutional:       Appearance: Normal appearance. He is well-developed. HENT:      Head: Normocephalic and atraumatic. Right Ear: Tympanic membrane, ear canal and external ear normal. There is no impacted cerumen. Left Ear: Tympanic membrane, ear canal and external ear normal. There is no impacted cerumen. Nose: Nose normal.      Mouth/Throat:      Lips: Pink. Mouth: Mucous membranes are moist.      Dentition: Normal dentition. Tongue: No lesions. Pharynx: Oropharynx is clear. Uvula midline. Tonsils: No tonsillar exudate or tonsillar abscesses. Eyes:      General: Lids are normal.         Right eye: No discharge. Left eye: No discharge. Extraocular Movements:      Right eye: Normal extraocular motion. Left eye: Normal extraocular motion. Conjunctiva/sclera: Conjunctivae normal.      Right eye: Right conjunctiva is not injected. Left eye: Left conjunctiva is not injected.       Pupils: Pupils are equal, round, and reactive to light. Neck:      Thyroid: No thyromegaly. Vascular: No carotid bruit or JVD. Cardiovascular:      Rate and Rhythm: Normal rate and regular rhythm. Pulses:           Carotid pulses are 2+ on the right side and 2+ on the left side. Radial pulses are 2+ on the right side and 2+ on the left side. Heart sounds: Normal heart sounds, S1 normal and S2 normal. No murmur heard. Pulmonary:      Effort: Pulmonary effort is normal. No accessory muscle usage. Breath sounds: Normal breath sounds. Abdominal:      General: Bowel sounds are normal. There is no distension or abdominal bruit. Palpations: Abdomen is soft. There is no mass. Tenderness: There is no abdominal tenderness. Hernia: No hernia is present. Musculoskeletal:         General: Normal range of motion. Cervical back: Normal range of motion and neck supple. Right lower leg: No edema. Left lower leg: No edema. Lymphadenopathy:      Cervical:      Right cervical: No superficial cervical adenopathy. Left cervical: No superficial cervical adenopathy. Skin:     General: Skin is warm and dry. Coloration: Skin is not jaundiced or pale. Findings: No lesion or rash. Nails: There is no clubbing. Neurological:      Mental Status: He is alert and oriented to person, place, and time. Cranial Nerves: No facial asymmetry. Motor: No weakness or tremor. Coordination: Coordination normal.      Gait: Gait normal.      Deep Tendon Reflexes: Reflexes are normal and symmetric. Psychiatric:         Attention and Perception: Attention normal.         Mood and Affect: Mood normal.         Speech: Speech normal.         Behavior: Behavior normal.         Thought Content:  Thought content normal.         Cognition and Memory: Memory normal.         Judgment: Judgment normal.      /76 (Site: Left Upper Arm, Position: Sitting, Cuff Size: Medium Adult)   Pulse 89 Temp 97.4 °F (36.3 °C) (Temporal)   Wt 243 lb (110.2 kg)   SpO2 99%   BMI 40.44 kg/m²      ASSESSMENT:     Diagnosis Orders   1. Mild intellectual disability-ongoing       2. Essential hypertension-well-controlled CBC    Comprehensive Metabolic Panel    Lipid Panel    TSH    Urinalysis      3. Glucose intolerance (impaired glucose tolerance)-ongoing Hemoglobin A1C      4. Encounter for screening for HIV  HIV Rapid 1&2      5. Need for hepatitis C screening test  Hepatitis C Antibody           PLAN:    1. Paperwork for group home filled out today. Continue to live in a group home  2. Blood work. 3.  Blood work  4. Blood work  5.   Blood work  Follow-up 6 months

## 2022-09-28 ENCOUNTER — TELEPHONE (OUTPATIENT)
Dept: FAMILY MEDICINE CLINIC | Age: 37
End: 2022-09-28

## 2022-09-28 NOTE — TELEPHONE ENCOUNTER
Cinthia Hammond called on [employer]. She states he was seen at the walk-in clinic on 9/14/22 and given diclofenac gel for knee pain. They need to change that medication to prn or a stop date. Please advise.

## 2022-09-29 ENCOUNTER — OFFICE VISIT (OUTPATIENT)
Dept: FAMILY MEDICINE CLINIC | Age: 37
End: 2022-09-29
Payer: MEDICARE

## 2022-09-29 VITALS
SYSTOLIC BLOOD PRESSURE: 118 MMHG | BODY MASS INDEX: 40.98 KG/M2 | DIASTOLIC BLOOD PRESSURE: 78 MMHG | WEIGHT: 246 LBS | HEIGHT: 65 IN | TEMPERATURE: 98 F | HEART RATE: 70 BPM | OXYGEN SATURATION: 98 %

## 2022-09-29 DIAGNOSIS — L02.411 CUTANEOUS ABSCESS OF RIGHT AXILLA: Primary | ICD-10-CM

## 2022-09-29 PROCEDURE — G8427 DOCREV CUR MEDS BY ELIG CLIN: HCPCS | Performed by: NURSE PRACTITIONER

## 2022-09-29 PROCEDURE — G8417 CALC BMI ABV UP PARAM F/U: HCPCS | Performed by: NURSE PRACTITIONER

## 2022-09-29 PROCEDURE — 99213 OFFICE O/P EST LOW 20 MIN: CPT | Performed by: NURSE PRACTITIONER

## 2022-09-29 PROCEDURE — 96372 THER/PROPH/DIAG INJ SC/IM: CPT | Performed by: NURSE PRACTITIONER

## 2022-09-29 PROCEDURE — 10060 I&D ABSCESS SIMPLE/SINGLE: CPT | Performed by: NURSE PRACTITIONER

## 2022-09-29 PROCEDURE — 4004F PT TOBACCO SCREEN RCVD TLK: CPT | Performed by: NURSE PRACTITIONER

## 2022-09-29 RX ORDER — CLINDAMYCIN HYDROCHLORIDE 300 MG/1
300 CAPSULE ORAL 3 TIMES DAILY
Qty: 30 CAPSULE | Refills: 0 | Status: SHIPPED | OUTPATIENT
Start: 2022-09-29 | End: 2022-10-09

## 2022-09-29 RX ORDER — CEFTRIAXONE 500 MG/1
500 INJECTION, POWDER, FOR SOLUTION INTRAMUSCULAR; INTRAVENOUS ONCE
Status: COMPLETED | OUTPATIENT
Start: 2022-09-29 | End: 2022-09-29

## 2022-09-29 RX ADMIN — CEFTRIAXONE 500 MG: 500 INJECTION, POWDER, FOR SOLUTION INTRAMUSCULAR; INTRAVENOUS at 15:29

## 2022-09-29 NOTE — PROGRESS NOTES
[facility]YSICIAN [facility]  [facility] [place] [place]  [address] [address]  Dept: [phone]  Dept Fax: [phone]  Loc: [phone]    Brooklyn Smith is a 30 y.o. male who presents today for his medical conditions/complaints as noted below. Brooklyn Smith is c/o of Cyst (Possible cyst under R arm. Has been present for a while. Is very painful and draining.  )      Chief Complaint   Patient presents with    Cyst     Possible cyst under R arm. Has been present for a while. Is very painful and draining. HPI:     HPI  Patient presents with possible cyst under R arn. Has been present for a while. Is very painful & has yellowish drainage. He does not know how long it has bene there, just that it hurts. Past Medical History:   Diagnosis Date    Allergic rhinitis     Episodic mood disorder ([address]     Hyperlipidemia     Hypertension     Mild intellectual disabilities     Morbid obesity with BMI of 40.0-44.9, adult ([facility] [address] 9/18/2019    Schizophrenia ([facility] [address]         No past surgical history on file.     Social History     Tobacco Use    Smoking status: Every Day     Packs/day: 0.50     Types: Cigarettes    Smokeless tobacco: Never   Substance Use Topics    Alcohol use: No     Alcohol/week: 0.0 standard drinks        Current Outpatient Medications   Medication Sig Dispense Refill    diclofenac sodium (VOLTAREN) 1 % GEL Apply to left knee area up to 4 times daily for pain and inflammation 100 g 5    clindamycin (CLEOCIN) 300 MG capsule Take 1 capsule by mouth 3 times daily for 10 days 30 capsule 0    divalproex (DEPAKOTE) 500 MG DR tablet Take 500 mg by mouth 3 times daily      hydroCHLOROthiazide (MICROZIDE) 12.5 MG capsule Take 12.5 mg by mouth daily      latanoprost (XALATAN) 0.005 % ophthalmic solution 1 drop nightly      omeprazole (PRILOSEC) 20 MG delayed release capsule Take 20 mg by mouth daily      QUEtiapine (SEROQUEL) 300 MG tablet Take 300 mg by mouth 2 times daily risperiDONE (RISPERDAL M-TABS) 2 MG disintegrating tablet Take 2 mg by mouth 2 times daily      amLODIPine (NORVASC) 10 MG tablet Take 10 mg by mouth daily      losartan (COZAAR) 100 MG tablet Take 1 tablet by mouth daily 30 tablet 5    loratadine (CLARITIN) 10 MG tablet GIVE ONE TABLET BY MOUTH ONCE DAILY. DX: ALLERGIES 31 tablet 11    meloxicam (MOBIC) 15 MG tablet Take 15 mg by mouth daily       ibuprofen (ADVIL;MOTRIN) 800 MG tablet Take 800 mg by mouth every 6 hours as needed for Pain       olopatadine (PATADAY) 0.2 % SOLN ophthalmic solution 1 drop daily       acetaminophen (TYLENOL) 500 MG tablet Take 1,000 mg by mouth every 4 hours as needed for Pain       loperamide (IMODIUM) 2 MG capsule Take 2 mg by mouth 4 times daily as needed for Diarrhea       acyclovir (ZOVIRAX) 5 % ointment Apply topically 4 times daily. 15 g 1    benzonatate (TESSALON) 200 MG capsule Take 200 mg by mouth 3 times daily as needed for Cough       No current facility-administered medications for this visit. Allergies   Allergen Reactions    Citalopram     Lexapro [Escitalopram Oxalate]     Lisinopril Swelling    Seasonal     Serotonin Reuptake Inhibitors (Ssris)        Family History   Family history unknown: Yes               Subjective:      Review of Systems   Constitutional: Negative. HENT: Negative. Eyes: Negative. Respiratory: Negative. Cardiovascular: Negative. Gastrointestinal: Negative. Endocrine: Negative. Genitourinary: Negative. Musculoskeletal: Negative. Skin:  Positive for wound (right underarm). Neurological: Negative. Hematological: Negative. Psychiatric/Behavioral: Negative. Objective:     Physical Exam  Vitals and nursing note reviewed. Constitutional:       Appearance: Normal appearance. HENT:      Head: Normocephalic and atraumatic.       Right Ear: Hearing, tympanic membrane, ear canal and external ear normal.      Left Ear: Hearing, tympanic membrane, ear canal and external ear normal.      Nose: Nose normal.      Mouth/Throat:      Lips: Pink. Mouth: Mucous membranes are moist.      Pharynx: Oropharynx is clear. Eyes:      General: Lids are normal.      Extraocular Movements: Extraocular movements intact. Conjunctiva/sclera: Conjunctivae normal.      Pupils: Pupils are equal, round, and reactive to light. Neck:      Thyroid: No thyromegaly. Cardiovascular:      Rate and Rhythm: Normal rate and regular rhythm. Pulses: Normal pulses. Dorsalis pedis pulses are 2+ on the right side and 2+ on the left side. Posterior tibial pulses are 2+ on the right side and 2+ on the left side. Heart sounds: Normal heart sounds. Pulmonary:      Effort: Pulmonary effort is normal.      Breath sounds: Normal breath sounds and air entry. Abdominal:      General: Bowel sounds are normal.      Palpations: Abdomen is soft. Musculoskeletal:      Cervical back: Full passive range of motion without pain, normal range of motion and neck supple. Thoracic back: No tenderness. Normal range of motion. Lumbar back: No tenderness. Normal range of motion. Lymphadenopathy:      Cervical: No cervical adenopathy. Skin:     General: Skin is warm and dry. Capillary Refill: Capillary refill takes less than 2 seconds. Neurological:      General: No focal deficit present. Mental Status: He is alert and oriented to person, place, and time. Mental status is at baseline. Coordination: Coordination is intact. Psychiatric:         Mood and Affect: Mood is anxious. Speech: Speech is delayed. Behavior: Behavior normal.         Thought Content:  Thought content normal.         Cognition and Memory: Cognition and memory normal.         Judgment: Judgment normal.       /78   Pulse 70   Temp 98 °F (36.7 °C)   Ht 5' 5\" (1.651 m)   Wt 246 lb (111.6 kg)   SpO2 98%   BMI 40.94 kg/m²     Assessment:      Diagnosis Orders   1. Cutaneous abscess of right axilla  clindamycin (CLEOCIN) 300 MG capsule    cefTRIAXone (ROCEPHIN) injection 500 mg          No results found for this visit on 09/29/22. Plan:     1. Cutaneous abscess of right axilla  Rocephin injection in office today  Oral abx prescribed. - clindamycin (CLEOCIN) 300 MG capsule; Take 1 capsule by mouth 3 times daily for 10 days  Dispense: 30 capsule; Refill: 0  - cefTRIAXone (ROCEPHIN) injection 500 mg       INCISION AND DRAINAGE:  99079 (uncomplicated)  Patient was given verbal informed consent and agreed verbally and signed a consent to the risks and benefits of procedure. Risks discussed included bleeding, infection, damage to structures, and potentially other yet unlikely unforeseen consequences of those risks. An impression was made with a pen for incisional site. The area was cleaned w/ betadine and alcohol swab. It was then sprayed w/ ethyl chloride and injected w/ a 25 gauge 0.75\" needle into the the tissues over the indurated and fluctuant area. 1cc of lidocaine 1% without epinephrine was injected into and around the fluctuant mass. It was aspirated and no bloody return into syringe. The medicine was administered w/o issue. After allowing 2-3 minutes for numbness to occur, a linear line approximately 1 cm over the fluctuant area approximately 1 cm deep. A mild amount of bleeding occurred. 4 cc of purulent fluid was expressed. The area was then explored w/ forceps to break any and all septations. Packing was  required. A bandage was applied directly thereafter. All bleeding stopped. EBL - 0 cc. Pt was instructed on basic cleansing and rest of affected area. Pt tolerated the procedure well and was able to leave the office without any issue. The patient was informed of any complications to call or return. Return in about 1 day (around 9/30/2022). No orders of the defined types were placed in this encounter.       Orders Placed This Encounter   Medications    clindamycin (CLEOCIN) 300 MG capsule     Sig: Take 1 capsule by mouth 3 times daily for 10 days     Dispense:  30 capsule     Refill:  0    cefTRIAXone (ROCEPHIN) injection 500 mg     Order Specific Question:   Antimicrobial Indications     Answer:   Skin and Soft Tissue Infection     Order Specific Question:   Skin duration of therapy     Answer:   5 days            Patient offered educational handouts and has had all questions answered. Patient voices understanding and agrees to plans along with risks and benefits of plan. Patient is instructed to continue prior meds, diet, and exercise plans as instructed. Patient agrees to follow up as instructed and sooner if needed. Patient agrees to go to ER if condition becomes emergent. EMR Dragon/transcription disclaimer: Some of this encounter note is an electronic transcription/translation of spoken language to printed text. The electronic translation of spoken language may permit erroneous, or at times, nonsensical words or phrases to be inadvertently transcribed.  Although I have reviewed the note for such errors, some may still exist.    Electronically signed by AVELINO Thompson on 10/6/2022 at 10:10 PM

## 2022-09-30 ENCOUNTER — OFFICE VISIT (OUTPATIENT)
Dept: FAMILY MEDICINE CLINIC | Age: 37
End: 2022-09-30
Payer: MEDICARE

## 2022-09-30 VITALS
WEIGHT: 246 LBS | OXYGEN SATURATION: 98 % | TEMPERATURE: 97 F | SYSTOLIC BLOOD PRESSURE: 128 MMHG | BODY MASS INDEX: 40.98 KG/M2 | HEART RATE: 88 BPM | HEIGHT: 65 IN | DIASTOLIC BLOOD PRESSURE: 80 MMHG

## 2022-09-30 DIAGNOSIS — L02.411 CUTANEOUS ABSCESS OF RIGHT AXILLA: Primary | ICD-10-CM

## 2022-09-30 DIAGNOSIS — B00.1 FEVER BLISTER: ICD-10-CM

## 2022-09-30 PROCEDURE — G8417 CALC BMI ABV UP PARAM F/U: HCPCS | Performed by: NURSE PRACTITIONER

## 2022-09-30 PROCEDURE — 4004F PT TOBACCO SCREEN RCVD TLK: CPT | Performed by: NURSE PRACTITIONER

## 2022-09-30 PROCEDURE — 99213 OFFICE O/P EST LOW 20 MIN: CPT | Performed by: NURSE PRACTITIONER

## 2022-09-30 PROCEDURE — G8427 DOCREV CUR MEDS BY ELIG CLIN: HCPCS | Performed by: NURSE PRACTITIONER

## 2022-09-30 RX ORDER — ACYCLOVIR 50 MG/G
OINTMENT TOPICAL
Qty: 15 G | Refills: 1 | Status: SHIPPED | OUTPATIENT
Start: 2022-09-30 | End: 2022-10-07

## 2022-09-30 ASSESSMENT — ENCOUNTER SYMPTOMS
RESPIRATORY NEGATIVE: 1
GASTROINTESTINAL NEGATIVE: 1
EYES NEGATIVE: 1

## 2022-09-30 NOTE — PROGRESS NOTES
Lips: Pink. Mouth: Mucous membranes are moist.      Pharynx: Oropharynx is clear. Eyes:      General: Lids are normal.      Extraocular Movements: Extraocular movements intact. Conjunctiva/sclera: Conjunctivae normal.      Pupils: Pupils are equal, round, and reactive to light. Neck:      Thyroid: No thyromegaly. Cardiovascular:      Rate and Rhythm: Normal rate and regular rhythm. Pulses: Normal pulses. Dorsalis pedis pulses are 2+ on the right side and 2+ on the left side. Posterior tibial pulses are 2+ on the right side and 2+ on the left side. Heart sounds: Normal heart sounds. Pulmonary:      Effort: Pulmonary effort is normal.      Breath sounds: Normal breath sounds and air entry. Abdominal:      General: Bowel sounds are normal.      Palpations: Abdomen is soft. Musculoskeletal:      Cervical back: Full passive range of motion without pain, normal range of motion and neck supple. Thoracic back: No tenderness. Normal range of motion. Lumbar back: No tenderness. Normal range of motion. Lymphadenopathy:      Cervical: No cervical adenopathy. Skin:     General: Skin is warm and dry. Capillary Refill: Capillary refill takes less than 2 seconds. Comments: Wound in axillary region that has improved since yesterday. Packing had been removed by patient. Neurological:      General: No focal deficit present. Mental Status: He is alert and oriented to person, place, and time. Mental status is at baseline. Coordination: Coordination is intact. Psychiatric:         Mood and Affect: Mood normal.         Speech: Speech normal.         Behavior: Behavior normal.         Thought Content:  Thought content normal.         Cognition and Memory: Cognition and memory normal.         Judgment: Judgment normal.       /80   Pulse 88   Temp 97 °F (36.1 °C)   Ht 5' 5\" (1.651 m)   Wt 246 lb (111.6 kg)   SpO2 98%   BMI 40.94 kg/m² Assessment:      Diagnosis Orders   1. Cutaneous abscess of right axilla        2. Fever blister  acyclovir (ZOVIRAX) 5 % ointment          No results found for this visit on 09/30/22. Plan:     1. Cutaneous abscess of right axilla  Continue abx and warm soaks    2. Fever blister  Apply as discussed    - acyclovir (ZOVIRAX) 5 % ointment; Apply topically 4 times daily. Dispense: 15 g; Refill: 1     Return if symptoms worsen or fail to improve. No orders of the defined types were placed in this encounter. Orders Placed This Encounter   Medications    acyclovir (ZOVIRAX) 5 % ointment     Sig: Apply topically 4 times daily. Dispense:  15 g     Refill:  1            Patient offered educational handouts and has had all questions answered. Patient voices understanding and agrees to plans along with risks and benefits of plan. Patient is instructed to continue prior meds, diet, and exercise plans as instructed. Patient agrees to follow up as instructed and sooner if needed. Patient agrees to go to ER if condition becomes emergent. EMR Dragon/transcription disclaimer: Some of this encounter note is an electronic transcription/translation of spoken language to printed text. The electronic translation of spoken language may permit erroneous, or at times, nonsensical words or phrases to be inadvertently transcribed.  Although I have reviewed the note for such errors, some may still exist.    Electronically signed by AVELINO Medrano on 9/30/2022 at 3:24 PM English

## 2022-10-06 ASSESSMENT — ENCOUNTER SYMPTOMS
EYES NEGATIVE: 1
GASTROINTESTINAL NEGATIVE: 1
RESPIRATORY NEGATIVE: 1

## 2022-10-11 ENCOUNTER — OFFICE VISIT (OUTPATIENT)
Dept: FAMILY MEDICINE CLINIC | Age: 37
End: 2022-10-11
Payer: MEDICARE

## 2022-10-11 VITALS
HEART RATE: 78 BPM | BODY MASS INDEX: 40.15 KG/M2 | HEIGHT: 65 IN | SYSTOLIC BLOOD PRESSURE: 130 MMHG | OXYGEN SATURATION: 97 % | TEMPERATURE: 98.2 F | DIASTOLIC BLOOD PRESSURE: 78 MMHG | WEIGHT: 241 LBS

## 2022-10-11 DIAGNOSIS — L02.411 CUTANEOUS ABSCESS OF RIGHT AXILLA: Primary | ICD-10-CM

## 2022-10-11 PROCEDURE — G8484 FLU IMMUNIZE NO ADMIN: HCPCS | Performed by: NURSE PRACTITIONER

## 2022-10-11 PROCEDURE — G8427 DOCREV CUR MEDS BY ELIG CLIN: HCPCS | Performed by: NURSE PRACTITIONER

## 2022-10-11 PROCEDURE — 4004F PT TOBACCO SCREEN RCVD TLK: CPT | Performed by: NURSE PRACTITIONER

## 2022-10-11 PROCEDURE — 99213 OFFICE O/P EST LOW 20 MIN: CPT | Performed by: NURSE PRACTITIONER

## 2022-10-11 PROCEDURE — G8417 CALC BMI ABV UP PARAM F/U: HCPCS | Performed by: NURSE PRACTITIONER

## 2022-10-11 ASSESSMENT — ENCOUNTER SYMPTOMS
EYES NEGATIVE: 1
GASTROINTESTINAL NEGATIVE: 1
RESPIRATORY NEGATIVE: 1

## 2022-10-11 NOTE — PROGRESS NOTES
[facility] PHYSICIAN SERVICES  [facility] [place]  [address] [address]  Dept: [phone]  Dept Fax: [phone]  Loc: [phone]    Coleraine Burn is a 30 y.o. male who presents today for his medical conditions/complaints as noted below. Coleraine Burn is c/o of Follow-up (boil)      Chief Complaint   Patient presents with    Follow-up     boil       HPI:     HPI  Patient is here for recheck on abscess to right axillary. Patient has finished abx and states things are better. Denies fever. Past Medical History:   Diagnosis Date    Allergic rhinitis     Episodic mood disorder ([facility] [address]     Hyperlipidemia     Hypertension     Mild intellectual disabilities     Morbid obesity with BMI of 40.0-44.9, adult ([facility] [address] 9/18/2019    Schizophrenia ([facility] [address]         No past surgical history on file. Social History     Tobacco Use    Smoking status: Every Day     Packs/day: 0.50     Types: Cigarettes    Smokeless tobacco: Never   Substance Use Topics    Alcohol use: No     Alcohol/week: 0.0 standard drinks        Current Outpatient Medications   Medication Sig Dispense Refill    diclofenac sodium (VOLTAREN) 1 % GEL Apply to left knee area up to 4 times daily for pain and inflammation 100 g 5    divalproex (DEPAKOTE) 500 MG DR tablet Take 500 mg by mouth 3 times daily      hydroCHLOROthiazide (MICROZIDE) 12.5 MG capsule Take 12.5 mg by mouth daily      latanoprost (XALATAN) 0.005 % ophthalmic solution 1 drop nightly      omeprazole (PRILOSEC) 20 MG delayed release capsule Take 20 mg by mouth daily      QUEtiapine (SEROQUEL) 300 MG tablet Take 300 mg by mouth 2 times daily      risperiDONE (RISPERDAL M-TABS) 2 MG disintegrating tablet Take 2 mg by mouth 2 times daily      amLODIPine (NORVASC) 10 MG tablet Take 10 mg by mouth daily      losartan (COZAAR) 100 MG tablet Take 1 tablet by mouth daily 30 tablet 5    loratadine (CLARITIN) 10 MG tablet GIVE ONE TABLET BY MOUTH ONCE DAILY.   DX: ALLERGIES 31 tablet 11    meloxicam (MOBIC) 15 MG tablet Take 15 mg by mouth daily       ibuprofen (ADVIL;MOTRIN) 800 MG tablet Take 800 mg by mouth every 6 hours as needed for Pain       olopatadine (PATADAY) 0.2 % SOLN ophthalmic solution 1 drop daily       acetaminophen (TYLENOL) 500 MG tablet Take 1,000 mg by mouth every 4 hours as needed for Pain       loperamide (IMODIUM) 2 MG capsule Take 2 mg by mouth 4 times daily as needed for Diarrhea       benzonatate (TESSALON) 200 MG capsule Take 200 mg by mouth 3 times daily as needed for Cough (Patient not taking: Reported on 10/11/2022)       No current facility-administered medications for this visit. Allergies   Allergen Reactions    Citalopram     Lexapro [Escitalopram Oxalate]     Lisinopril Swelling    Seasonal     Serotonin Reuptake Inhibitors (Ssris)        Family History   Family history unknown: Yes               Subjective:      Review of Systems   Constitutional: Negative. HENT: Negative. Eyes: Negative. Respiratory: Negative. Cardiovascular: Negative. Gastrointestinal: Negative. Endocrine: Negative. Genitourinary: Negative. Musculoskeletal: Negative. Skin: Negative. Neurological: Negative. Hematological: Negative. Psychiatric/Behavioral: Negative. Objective:     Physical Exam  Vitals and nursing note reviewed. Constitutional:       Appearance: Normal appearance. HENT:      Head: Normocephalic and atraumatic. Right Ear: Hearing, tympanic membrane, ear canal and external ear normal.      Left Ear: Hearing, tympanic membrane, ear canal and external ear normal.      Nose: Nose normal.      Mouth/Throat:      Lips: Pink. Mouth: Mucous membranes are moist.      Pharynx: Oropharynx is clear. Eyes:      General: Lids are normal.      Extraocular Movements: Extraocular movements intact. Conjunctiva/sclera: Conjunctivae normal.      Pupils: Pupils are equal, round, and reactive to light.    Neck: Thyroid: No thyromegaly. Cardiovascular:      Rate and Rhythm: Normal rate and regular rhythm. Pulses: Normal pulses. Dorsalis pedis pulses are 2+ on the right side and 2+ on the left side. Posterior tibial pulses are 2+ on the right side and 2+ on the left side. Heart sounds: Normal heart sounds. Pulmonary:      Effort: Pulmonary effort is normal.      Breath sounds: Normal breath sounds and air entry. Chest:       Abdominal:      General: Bowel sounds are normal.      Palpations: Abdomen is soft. Musculoskeletal:      Cervical back: Full passive range of motion without pain, normal range of motion and neck supple. Thoracic back: No tenderness. Normal range of motion. Lumbar back: No tenderness. Normal range of motion. Lymphadenopathy:      Cervical: No cervical adenopathy. Skin:     General: Skin is warm and dry. Capillary Refill: Capillary refill takes less than 2 seconds. Neurological:      General: No focal deficit present. Mental Status: He is alert and oriented to person, place, and time. Mental status is at baseline. Coordination: Coordination is intact. Psychiatric:         Mood and Affect: Mood normal.         Speech: Speech is delayed. Behavior: Behavior normal.         Thought Content: Thought content normal.         Cognition and Memory: Cognition and memory normal.         Judgment: Judgment normal.       /78   Pulse 78   Temp 98.2 °F (36.8 °C)   Ht 5' 5\" (1.651 m)   Wt 241 lb (109.3 kg)   SpO2 97%   BMI 40.10 kg/m²     Assessment:      Diagnosis Orders   1. Cutaneous abscess of right axilla            No results found for this visit on 10/11/22. Plan:     1. Cutaneous abscess of right axilla  Patient is to use warm compresses 3-4 times per day for the next week. Will recheck area in 4 weeks. Return in about 4 weeks (around 11/8/2022) for abscess recheck.     No orders of the defined types were placed in this encounter. No orders of the defined types were placed in this encounter. Patient offered educational handouts and has had all questions answered. Patient voices understanding and agrees to plans along with risks and benefits of plan. Patient is instructed to continue prior meds, diet, and exercise plans as instructed. Patient agrees to follow up as instructed and sooner if needed. Patient agrees to go to ER if condition becomes emergent. EMR Dragon/transcription disclaimer: Some of this encounter note is an electronic transcription/translation of spoken language to printed text. The electronic translation of spoken language may permit erroneous, or at times, nonsensical words or phrases to be inadvertently transcribed.  Although I have reviewed the note for such errors, some may still exist.    Electronically signed by AVELINO Lord on 10/11/2022 at 3:50 PM

## 2022-10-25 ENCOUNTER — OFFICE VISIT (OUTPATIENT)
Dept: PRIMARY CARE CLINIC | Age: 37
End: 2022-10-25
Payer: MEDICARE

## 2022-10-25 VITALS
OXYGEN SATURATION: 97 % | TEMPERATURE: 98.1 F | SYSTOLIC BLOOD PRESSURE: 124 MMHG | DIASTOLIC BLOOD PRESSURE: 68 MMHG | WEIGHT: 237 LBS | HEART RATE: 98 BPM | BODY MASS INDEX: 39.44 KG/M2

## 2022-10-25 DIAGNOSIS — R11.2 NAUSEA AND VOMITING, UNSPECIFIED VOMITING TYPE: ICD-10-CM

## 2022-10-25 DIAGNOSIS — K21.9 GASTROESOPHAGEAL REFLUX DISEASE, UNSPECIFIED WHETHER ESOPHAGITIS PRESENT: Primary | ICD-10-CM

## 2022-10-25 PROCEDURE — G8417 CALC BMI ABV UP PARAM F/U: HCPCS | Performed by: NURSE PRACTITIONER

## 2022-10-25 PROCEDURE — G8427 DOCREV CUR MEDS BY ELIG CLIN: HCPCS | Performed by: NURSE PRACTITIONER

## 2022-10-25 PROCEDURE — 4004F PT TOBACCO SCREEN RCVD TLK: CPT | Performed by: NURSE PRACTITIONER

## 2022-10-25 PROCEDURE — G8484 FLU IMMUNIZE NO ADMIN: HCPCS | Performed by: NURSE PRACTITIONER

## 2022-10-25 PROCEDURE — 99213 OFFICE O/P EST LOW 20 MIN: CPT | Performed by: NURSE PRACTITIONER

## 2022-10-25 RX ORDER — ONDANSETRON 4 MG/1
8 TABLET, ORALLY DISINTEGRATING ORAL EVERY 8 HOURS PRN
Qty: 20 TABLET | Refills: 0 | Status: SHIPPED | OUTPATIENT
Start: 2022-10-25

## 2022-10-25 RX ORDER — OMEPRAZOLE 20 MG/1
20 TABLET, DELAYED RELEASE ORAL DAILY
Qty: 30 TABLET | Refills: 3 | Status: SHIPPED | OUTPATIENT
Start: 2022-10-25

## 2022-10-25 RX ORDER — ONDANSETRON 4 MG/1
8 TABLET, ORALLY DISINTEGRATING ORAL EVERY 8 HOURS PRN
Qty: 20 TABLET | Refills: 0 | Status: SHIPPED | OUTPATIENT
Start: 2022-10-25 | End: 2022-10-25

## 2022-10-25 RX ORDER — OMEPRAZOLE 20 MG/1
20 TABLET, DELAYED RELEASE ORAL DAILY
Qty: 30 TABLET | Refills: 1 | Status: SHIPPED | OUTPATIENT
Start: 2022-10-25 | End: 2022-10-25

## 2022-10-25 ASSESSMENT — ENCOUNTER SYMPTOMS
VOMITING: 1
DIARRHEA: 0
RESPIRATORY NEGATIVE: 1
ABDOMINAL PAIN: 0
NAUSEA: 1
EYES NEGATIVE: 1
ALLERGIC/IMMUNOLOGIC NEGATIVE: 1

## 2022-10-25 NOTE — PROGRESS NOTES
Kari Brantley (:  1985) is a 30 y.o. male,Established patient, here for evaluation of the following chief complaint(s):  Emesis    Patient presents today with a caregiver from [facility] complaining of vomiting. Patient vomited yesterday and has not eaten or drank much fluids since. He denies any other symptoms such as fever, congestion, cough, diarrhea, abdominal pain. Vomiting has resolved. Upon questioning patient does describe symptoms that appear to be GERD. Explained to patient and caregiver I will prescribe Prilosec for reflux, and Zofran for nausea. Care instructions discussed. Patient and caregiver verbalized understanding and agree to plan of care. ASSESSMENT/PLAN:  1. Gastroesophageal reflux disease, unspecified whether esophagitis present  2. Nausea and vomiting, unspecified vomiting type   Orders Placed This Encounter   Medications    DISCONTD: omeprazole (PRILOSEC OTC) 20 MG tablet     Sig: Take 1 tablet by mouth daily 1 tablet by mouth daily. Separate from other medications by at least 4 hours as you may not absorb your other medications as well. Dispense:  30 tablet     Refill:  1    DISCONTD: ondansetron (ZOFRAN ODT) 4 MG disintegrating tablet     Sig: Take 2 tablets by mouth every 8 hours as needed for Nausea or Vomiting     Dispense:  20 tablet     Refill:  0    omeprazole (PRILOSEC OTC) 20 MG tablet     Sig: Take 1 tablet by mouth daily     Dispense:  30 tablet     Refill:  3    ondansetron (ZOFRAN ODT) 4 MG disintegrating tablet     Sig: Take 2 tablets by mouth every 8 hours as needed for Nausea or Vomiting     Dispense:  20 tablet     Refill:  0        Return if symptoms worsen or fail to improve. Caregiver from [facility] called office after visit was over and states they require paper scripts. Patient may  prescriptions at pharmacy, and scripts printed and faxed to [facility].     Subjective   SUBJECTIVE/OBJECTIVE:  HPI    Review of Systems Constitutional: Negative. HENT: Negative. Eyes: Negative. Respiratory: Negative. Cardiovascular: Negative. Gastrointestinal:  Positive for nausea and vomiting. Negative for abdominal pain and diarrhea. Endocrine: Negative. Genitourinary: Negative. Musculoskeletal: Negative. Skin: Negative. Allergic/Immunologic: Negative. Neurological: Negative. Hematological: Negative. Psychiatric/Behavioral: Negative. Objective   Physical Exam  Vitals reviewed. HENT:      Right Ear: Tympanic membrane, ear canal and external ear normal.      Left Ear: Tympanic membrane, ear canal and external ear normal.      Nose:      Right Sinus: No maxillary sinus tenderness or frontal sinus tenderness. Left Sinus: No maxillary sinus tenderness or frontal sinus tenderness. Mouth/Throat:      Lips: Pink. Mouth: Mucous membranes are moist.      Pharynx: No oropharyngeal exudate or posterior oropharyngeal erythema. Cardiovascular:      Rate and Rhythm: Normal rate and regular rhythm. Heart sounds: Normal heart sounds. Pulmonary:      Effort: Pulmonary effort is normal.      Breath sounds: Normal breath sounds. Abdominal:      General: Bowel sounds are normal.      Palpations: Abdomen is soft. Tenderness: There is no abdominal tenderness. There is no guarding or rebound. Lymphadenopathy:      Head:      Right side of head: No submandibular or tonsillar adenopathy. Left side of head: No submandibular or tonsillar adenopathy. Cervical:      Right cervical: No superficial cervical adenopathy. Left cervical: No superficial cervical adenopathy. Skin:     General: Skin is warm and dry. Neurological:      Mental Status: He is alert. Patient Instructions     Take Zofran as needed for nausea or vomiting as prescribed. Take Prilosec daily for acid reflux.   Take Prilosec at least 4 hours separately from other medications as it may cause you to not absorb your other medications as well. Increase fluids. Eat a bland diet. Elevate head of bed at night. An electronic signature was used to authenticate this note. --AVELINO Pike - CNP     EMR Dragon/translation disclaimer: Much of this encounter note is an electronic transcription/translation of spoken language to printed text. The electronic translation of spoken language may be erroneous, or at times, nonsensical words or phrases may be inadvertently transcribed.   Although I have reviewed the note for such errors, some may still exist.

## 2022-10-25 NOTE — PATIENT INSTRUCTIONS
Take Zofran as needed for nausea or vomiting as prescribed. Take Prilosec daily for acid reflux. Take Prilosec at least 4 hours separately from other medications as it may cause you to not absorb your other medications as well. Increase fluids. Eat a bland diet. Elevate head of bed at night. Avoid eating 2 hours before bedtime and drinking at least 30 minutes prior to bedtime. Follow-up with your PCP if symptoms persist or worsen.

## 2022-11-04 RX ORDER — HYDROCHLOROTHIAZIDE 12.5 MG/1
CAPSULE, GELATIN COATED ORAL
Qty: 30 CAPSULE | Refills: 11 | Status: SHIPPED | OUTPATIENT
Start: 2022-11-04

## 2022-11-04 RX ORDER — LORATADINE 10 MG/1
TABLET ORAL
Qty: 30 TABLET | Refills: 11 | Status: SHIPPED | OUTPATIENT
Start: 2022-11-04

## 2022-11-04 RX ORDER — OMEPRAZOLE 20 MG/1
CAPSULE, DELAYED RELEASE ORAL
Qty: 30 CAPSULE | Refills: 11 | Status: SHIPPED | OUTPATIENT
Start: 2022-11-04

## 2022-11-04 RX ORDER — LOSARTAN POTASSIUM 100 MG/1
TABLET ORAL
Qty: 30 TABLET | Refills: 11 | Status: SHIPPED | OUTPATIENT
Start: 2022-11-04

## 2022-11-10 ENCOUNTER — OFFICE VISIT (OUTPATIENT)
Dept: FAMILY MEDICINE CLINIC | Age: 37
End: 2022-11-10
Payer: MEDICARE

## 2022-11-10 VITALS
WEIGHT: 229 LBS | DIASTOLIC BLOOD PRESSURE: 74 MMHG | BODY MASS INDEX: 38.11 KG/M2 | HEART RATE: 81 BPM | TEMPERATURE: 97.1 F | OXYGEN SATURATION: 98 % | SYSTOLIC BLOOD PRESSURE: 120 MMHG

## 2022-11-10 DIAGNOSIS — L73.2 AXILLARY HIDRADENITIS SUPPURATIVA: Primary | ICD-10-CM

## 2022-11-10 PROCEDURE — 3078F DIAST BP <80 MM HG: CPT | Performed by: FAMILY MEDICINE

## 2022-11-10 PROCEDURE — 99213 OFFICE O/P EST LOW 20 MIN: CPT | Performed by: FAMILY MEDICINE

## 2022-11-10 PROCEDURE — 3074F SYST BP LT 130 MM HG: CPT | Performed by: FAMILY MEDICINE

## 2022-11-10 PROCEDURE — G8417 CALC BMI ABV UP PARAM F/U: HCPCS | Performed by: FAMILY MEDICINE

## 2022-11-10 PROCEDURE — G8484 FLU IMMUNIZE NO ADMIN: HCPCS | Performed by: FAMILY MEDICINE

## 2022-11-10 PROCEDURE — 4004F PT TOBACCO SCREEN RCVD TLK: CPT | Performed by: FAMILY MEDICINE

## 2022-11-10 PROCEDURE — G8427 DOCREV CUR MEDS BY ELIG CLIN: HCPCS | Performed by: FAMILY MEDICINE

## 2022-11-10 NOTE — PROGRESS NOTES
SUBJECTIVE:    Patient ID: Altagracia Powell is a 30 y. o.male. HPI:   Patient was seen last month by nurse practitioner secondary to axillary hidradenitis  suppurativa. He have improved. He is here for follow-up. Denies any pain. He is feeling better. Denies any fevers or chills. He have mental disabilities and lives in a group home. Past Medical History:   Diagnosis Date    Allergic rhinitis     Episodic mood disorder ([facility]     Hyperlipidemia     Hypertension     Mild intellectual disabilities     Morbid obesity with BMI of 40.0-44.9, adult ([facility] 9/18/2019    Schizophrenia ([facility]       Current Outpatient Medications   Medication Sig Dispense Refill    hydroCHLOROthiazide (MICROZIDE) 12.5 MG capsule GIVE ONE CAPSULE BY MOUTH ONCE DAILY. DX: HTN 30 capsule 11    loratadine (CLARITIN) 10 MG tablet GIVE ONE TABLET BY MOUTH ONCE DAILY. DX: ALLERGIES 30 tablet 11    omeprazole (PRILOSEC) 20 MG delayed release capsule GIVE ONE CAPSULE BY MOUTH EVERY MORNING (BEFORE BREAKFAST) 30 capsule 11    losartan (COZAAR) 100 MG tablet GIVE ONE TABLET BY MOUTH ONCE DAILY.  30 tablet 11    omeprazole (PRILOSEC OTC) 20 MG tablet Take 1 tablet by mouth daily 30 tablet 3    ondansetron (ZOFRAN ODT) 4 MG disintegrating tablet Take 2 tablets by mouth every 8 hours as needed for Nausea or Vomiting 20 tablet 0    diclofenac sodium (VOLTAREN) 1 % GEL Apply to left knee area up to 4 times daily for pain and inflammation as needed 100 g 5    divalproex (DEPAKOTE) 500 MG DR tablet Take 500 mg by mouth 3 times daily      latanoprost (XALATAN) 0.005 % ophthalmic solution 1 drop nightly      QUEtiapine (SEROQUEL) 300 MG tablet Take 300 mg by mouth 2 times daily      risperiDONE (RISPERDAL M-TABS) 2 MG disintegrating tablet Take 2 mg by mouth 2 times daily      amLODIPine (NORVASC) 10 MG tablet Take 10 mg by mouth daily      meloxicam (MOBIC) 15 MG tablet Take 15 mg by mouth daily       benzonatate (TESSALON) 200 MG capsule Take 200 mg by mouth 3 times daily as needed for Cough      ibuprofen (ADVIL;MOTRIN) 800 MG tablet Take 800 mg by mouth every 6 hours as needed for Pain       olopatadine (PATADAY) 0.2 % SOLN ophthalmic solution 1 drop daily       acetaminophen (TYLENOL) 500 MG tablet Take 1,000 mg by mouth every 4 hours as needed for Pain       loperamide (IMODIUM) 2 MG capsule Take 2 mg by mouth 4 times daily as needed for Diarrhea        No current facility-administered medications for this visit. Allergies   Allergen Reactions    Citalopram     Lexapro [Escitalopram Oxalate]     Lisinopril Swelling    Seasonal     Serotonin Reuptake Inhibitors (Ssris)        Review of Systems   Constitutional: Negative. HENT: Negative. Eyes: Negative. Respiratory: Negative. Cardiovascular: Negative. Gastrointestinal: Negative. Endocrine: Negative. Genitourinary: Negative. Musculoskeletal: Negative. Skin: Negative. Allergic/Immunologic: Negative. Neurological: Negative. Hematological: Negative. Psychiatric/Behavioral: Negative. OBJECTIVE:    Physical Exam  Vitals reviewed. Constitutional:       Appearance: Normal appearance. He is well-developed. HENT:      Head: Normocephalic and atraumatic. Right Ear: Tympanic membrane, ear canal and external ear normal. There is no impacted cerumen. Left Ear: Tympanic membrane, ear canal and external ear normal. There is no impacted cerumen. Nose: Nose normal.      Mouth/Throat:      Lips: Pink. Mouth: Mucous membranes are moist.      Dentition: Normal dentition. Tongue: No lesions. Pharynx: Oropharynx is clear. Uvula midline. Tonsils: No tonsillar exudate or tonsillar abscesses. Eyes:      General: Lids are normal.         Right eye: No discharge. Left eye: No discharge. Extraocular Movements:      Right eye: Normal extraocular motion. Left eye: Normal extraocular motion.       Conjunctiva/sclera: Conjunctivae normal.      Right eye: Right conjunctiva is not injected. Left eye: Left conjunctiva is not injected. Pupils: Pupils are equal, round, and reactive to light. Neck:      Thyroid: No thyromegaly. Vascular: No carotid bruit or JVD. Cardiovascular:      Rate and Rhythm: Normal rate and regular rhythm. Pulses:           Carotid pulses are 2+ on the right side and 2+ on the left side. Radial pulses are 2+ on the right side and 2+ on the left side. Heart sounds: Normal heart sounds, S1 normal and S2 normal. No murmur heard. Pulmonary:      Effort: Pulmonary effort is normal. No accessory muscle usage. Breath sounds: Normal breath sounds. Abdominal:      General: Bowel sounds are normal. There is no distension or abdominal bruit. Palpations: Abdomen is soft. There is no mass. Tenderness: There is no abdominal tenderness. Hernia: No hernia is present. Musculoskeletal:         General: Normal range of motion. Cervical back: Normal range of motion and neck supple. Right lower leg: No edema. Left lower leg: No edema. Lymphadenopathy:      Cervical:      Right cervical: No superficial cervical adenopathy. Left cervical: No superficial cervical adenopathy. Skin:     General: Skin is warm and dry. Coloration: Skin is not jaundiced or pale. Findings: No lesion or rash. Nails: There is no clubbing. Comments: Right axillary indurated cystic lesions   Neurological:      Mental Status: He is alert and oriented to person, place, and time. Cranial Nerves: No facial asymmetry. Motor: No weakness or tremor. Coordination: Coordination normal.      Gait: Gait normal.      Deep Tendon Reflexes: Reflexes are normal and symmetric.    Psychiatric:         Attention and Perception: Attention normal.         Mood and Affect: Mood normal.         Speech: Speech normal.         Behavior: Behavior normal.         Thought Content: Thought content normal.         Cognition and Memory: Memory normal.         Judgment: Judgment normal.      /74 (Site: Left Upper Arm, Position: Sitting, Cuff Size: Medium Adult)   Pulse 81   Temp 97.1 °F (36.2 °C) (Temporal)   Wt 229 lb (103.9 kg)   SpO2 98%   BMI 38.11 kg/m²      ASSESSMENT:     Diagnosis Orders   1. Axillary hidradenitis suppurativa             PLAN:    1. Watchful waiting.   Surgical consultation as needed follow-up if recurrences of infection

## 2023-01-18 LAB
BASOPHILS ABSOLUTE: 0 K/UL (ref 0–0.2)
BASOPHILS RELATIVE PERCENT: 0.7 % (ref 0–1)
CHOLESTEROL, TOTAL: 175 MG/DL (ref 160–199)
EOSINOPHILS ABSOLUTE: 0 K/UL (ref 0–0.6)
EOSINOPHILS RELATIVE PERCENT: 0.5 % (ref 0–5)
HCT VFR BLD CALC: 50.8 % (ref 42–52)
HDLC SERPL-MCNC: 55 MG/DL (ref 55–121)
HEMOGLOBIN: 16 G/DL (ref 14–18)
IMMATURE GRANULOCYTES #: 0 K/UL
LDL CHOLESTEROL CALCULATED: 100 MG/DL
LYMPHOCYTES ABSOLUTE: 2.3 K/UL (ref 1.1–4.5)
LYMPHOCYTES RELATIVE PERCENT: 41.5 % (ref 20–40)
MCH RBC QN AUTO: 24.5 PG (ref 27–31)
MCHC RBC AUTO-ENTMCNC: 31.5 G/DL (ref 33–37)
MCV RBC AUTO: 77.9 FL (ref 80–94)
MONOCYTES ABSOLUTE: 0.5 K/UL (ref 0–0.9)
MONOCYTES RELATIVE PERCENT: 8.2 % (ref 0–10)
NEUTROPHILS ABSOLUTE: 2.7 K/UL (ref 1.5–7.5)
NEUTROPHILS RELATIVE PERCENT: 48.9 % (ref 50–65)
PDW BLD-RTO: 18.6 % (ref 11.5–14.5)
PLATELET # BLD: 196 K/UL (ref 130–400)
PMV BLD AUTO: 11.8 FL (ref 9.4–12.4)
PROLACTIN: 18.48 NG/ML (ref 4.04–15.2)
RBC # BLD: 6.52 M/UL (ref 4.7–6.1)
TRIGL SERPL-MCNC: 101 MG/DL (ref 0–149)
VALPROIC ACID LEVEL: 102.4 UG/ML (ref 50–100)
WBC # BLD: 5.6 K/UL (ref 4.8–10.8)

## 2023-01-23 LAB — VALPROIC ACID LEVEL: 106.6 UG/ML (ref 50–100)

## 2023-01-26 LAB
ALBUMIN SERPL-MCNC: 4.3 G/DL (ref 3.5–5.2)
ALP BLD-CCNC: 52 U/L (ref 40–130)
ALT SERPL-CCNC: 9 U/L (ref 5–41)
ANION GAP SERPL CALCULATED.3IONS-SCNC: 7 MMOL/L (ref 7–19)
AST SERPL-CCNC: 16 U/L (ref 5–40)
BILIRUB SERPL-MCNC: 0.3 MG/DL (ref 0.2–1.2)
BUN BLDV-MCNC: 8 MG/DL (ref 6–20)
CALCIUM SERPL-MCNC: 9.6 MG/DL (ref 8.6–10)
CHLORIDE BLD-SCNC: 97 MMOL/L (ref 98–111)
CO2: 25 MMOL/L (ref 22–29)
CREAT SERPL-MCNC: 1 MG/DL (ref 0.5–1.2)
GFR SERPL CREATININE-BSD FRML MDRD: >60 ML/MIN/{1.73_M2}
REASON FOR REJECTION: NORMAL
REJECTED TEST: NORMAL
SODIUM BLD-SCNC: 129 MMOL/L (ref 136–145)
TOTAL PROTEIN: 7.5 G/DL (ref 6.6–8.7)

## 2023-01-30 RX ORDER — OLOPATADINE HYDROCHLORIDE 2 MG/ML
SOLUTION/ DROPS OPHTHALMIC
Qty: 2.5 ML | Refills: 11 | Status: SHIPPED | OUTPATIENT
Start: 2023-01-30

## 2023-02-08 ENCOUNTER — OFFICE VISIT (OUTPATIENT)
Dept: FAMILY MEDICINE CLINIC | Age: 38
End: 2023-02-08
Payer: MEDICARE

## 2023-02-08 VITALS
DIASTOLIC BLOOD PRESSURE: 70 MMHG | TEMPERATURE: 97.5 F | WEIGHT: 215 LBS | HEART RATE: 92 BPM | OXYGEN SATURATION: 98 % | BODY MASS INDEX: 35.78 KG/M2 | SYSTOLIC BLOOD PRESSURE: 126 MMHG

## 2023-02-08 DIAGNOSIS — F20.89 OTHER SCHIZOPHRENIA (HCC): ICD-10-CM

## 2023-02-08 DIAGNOSIS — R79.89 PROLACTIN INCREASED: Primary | ICD-10-CM

## 2023-02-08 DIAGNOSIS — E66.01 SEVERE OBESITY (BMI 35.0-39.9) WITH COMORBIDITY (HCC): ICD-10-CM

## 2023-02-08 PROCEDURE — 4004F PT TOBACCO SCREEN RCVD TLK: CPT | Performed by: FAMILY MEDICINE

## 2023-02-08 PROCEDURE — 3078F DIAST BP <80 MM HG: CPT | Performed by: FAMILY MEDICINE

## 2023-02-08 PROCEDURE — G8427 DOCREV CUR MEDS BY ELIG CLIN: HCPCS | Performed by: FAMILY MEDICINE

## 2023-02-08 PROCEDURE — 99214 OFFICE O/P EST MOD 30 MIN: CPT | Performed by: FAMILY MEDICINE

## 2023-02-08 PROCEDURE — 3074F SYST BP LT 130 MM HG: CPT | Performed by: FAMILY MEDICINE

## 2023-02-08 PROCEDURE — G8484 FLU IMMUNIZE NO ADMIN: HCPCS | Performed by: FAMILY MEDICINE

## 2023-02-08 PROCEDURE — G8417 CALC BMI ABV UP PARAM F/U: HCPCS | Performed by: FAMILY MEDICINE

## 2023-02-08 ASSESSMENT — ENCOUNTER SYMPTOMS
RESPIRATORY NEGATIVE: 1
GASTROINTESTINAL NEGATIVE: 1
ALLERGIC/IMMUNOLOGIC NEGATIVE: 1
EYES NEGATIVE: 1

## 2023-02-08 ASSESSMENT — PATIENT HEALTH QUESTIONNAIRE - PHQ9
SUM OF ALL RESPONSES TO PHQ QUESTIONS 1-9: 2
1. LITTLE INTEREST OR PLEASURE IN DOING THINGS: 1
2. FEELING DOWN, DEPRESSED OR HOPELESS: 1
SUM OF ALL RESPONSES TO PHQ9 QUESTIONS 1 & 2: 2
SUM OF ALL RESPONSES TO PHQ QUESTIONS 1-9: 2

## 2023-02-08 NOTE — PROGRESS NOTES
SUBJECTIVE:    Patient ID: Clive Stallworth is a 30 y. o.male. HPI:   Patient here for evaluation of multiple medical problems  Patient is a 68-year-old male. He have mental disabilities. He also have a diagnosis schizophrenia. He is on multiple antipsychotic therapy. His prolactin level is slightly elevated. These been going on for a long time. More than likely his prolactin level is secondary to his antipsychotic use. There is no other endocrine abnormalities. He also have increased Depakote levels. Liver enzymes are within normal limits. He is morbid obese. He does not exercise. He does not follow diet. Past Medical History:   Diagnosis Date    Allergic rhinitis     Episodic mood disorder (HCC)     Hyperlipidemia     Hypertension     Mild intellectual disabilities     Morbid obesity with BMI of 40.0-44.9, adult ([facility] 9/18/2019    Schizophrenia ([facility])       Current Outpatient Medications   Medication Sig Dispense Refill    olopatadine (PATADAY) 0.2 % SOLN ophthalmic solution INSTILL ONE DROP IN  BOTH EYES ONCE DAILY AS NEEDED 2.5 mL 11    hydroCHLOROthiazide (MICROZIDE) 12.5 MG capsule GIVE ONE CAPSULE BY MOUTH ONCE DAILY. DX: HTN 30 capsule 11    loratadine (CLARITIN) 10 MG tablet GIVE ONE TABLET BY MOUTH ONCE DAILY. DX: ALLERGIES 30 tablet 11    omeprazole (PRILOSEC) 20 MG delayed release capsule GIVE ONE CAPSULE BY MOUTH EVERY MORNING (BEFORE BREAKFAST) 30 capsule 11    losartan (COZAAR) 100 MG tablet GIVE ONE TABLET BY MOUTH ONCE DAILY.  30 tablet 11    omeprazole (PRILOSEC OTC) 20 MG tablet Take 1 tablet by mouth daily 30 tablet 3    ondansetron (ZOFRAN ODT) 4 MG disintegrating tablet Take 2 tablets by mouth every 8 hours as needed for Nausea or Vomiting 20 tablet 0    diclofenac sodium (VOLTAREN) 1 % GEL Apply to left knee area up to 4 times daily for pain and inflammation as needed 100 g 5    divalproex (DEPAKOTE) 500 MG DR tablet Take 500 mg by mouth 3 times daily      latanoprost (XALATAN) 0.005 % ophthalmic solution 1 drop nightly      QUEtiapine (SEROQUEL) 300 MG tablet Take 300 mg by mouth 2 times daily      risperiDONE (RISPERDAL M-TABS) 2 MG disintegrating tablet Take 2 mg by mouth 2 times daily      amLODIPine (NORVASC) 10 MG tablet Take 10 mg by mouth daily      meloxicam (MOBIC) 15 MG tablet Take 15 mg by mouth daily       benzonatate (TESSALON) 200 MG capsule Take 200 mg by mouth 3 times daily as needed for Cough      ibuprofen (ADVIL;MOTRIN) 800 MG tablet Take 800 mg by mouth every 6 hours as needed for Pain       acetaminophen (TYLENOL) 500 MG tablet Take 1,000 mg by mouth every 4 hours as needed for Pain       loperamide (IMODIUM) 2 MG capsule Take 2 mg by mouth 4 times daily as needed for Diarrhea        No current facility-administered medications for this visit. Allergies   Allergen Reactions    Citalopram     Lexapro [Escitalopram Oxalate]     Lisinopril Swelling    Seasonal     Serotonin Reuptake Inhibitors (Ssris)        Review of Systems   Constitutional: Negative. HENT: Negative. Eyes: Negative. Respiratory: Negative. Cardiovascular: Negative. Gastrointestinal: Negative. Endocrine: Negative. Genitourinary: Negative. Musculoskeletal: Negative. Skin: Negative. Allergic/Immunologic: Negative. Neurological: Negative. Hematological: Negative. Psychiatric/Behavioral: Negative. OBJECTIVE:    Physical Exam  Vitals reviewed. Constitutional:       Appearance: Normal appearance. He is well-developed. HENT:      Head: Normocephalic and atraumatic. Right Ear: Tympanic membrane, ear canal and external ear normal. There is no impacted cerumen. Left Ear: Tympanic membrane, ear canal and external ear normal. There is no impacted cerumen. Nose: Nose normal.      Mouth/Throat:      Lips: Pink. Mouth: Mucous membranes are moist.      Dentition: Normal dentition. Tongue: No lesions.       Pharynx: Oropharynx is clear. Uvula midline. Tonsils: No tonsillar exudate or tonsillar abscesses. Eyes:      General: Lids are normal.         Right eye: No discharge. Left eye: No discharge. Extraocular Movements:      Right eye: Normal extraocular motion. Left eye: Normal extraocular motion. Conjunctiva/sclera: Conjunctivae normal.      Right eye: Right conjunctiva is not injected. Left eye: Left conjunctiva is not injected. Pupils: Pupils are equal, round, and reactive to light. Neck:      Thyroid: No thyromegaly. Vascular: No carotid bruit or JVD. Cardiovascular:      Rate and Rhythm: Normal rate and regular rhythm. Pulses:           Carotid pulses are 2+ on the right side and 2+ on the left side. Radial pulses are 2+ on the right side and 2+ on the left side. Heart sounds: Normal heart sounds, S1 normal and S2 normal. No murmur heard. Pulmonary:      Effort: Pulmonary effort is normal. No accessory muscle usage. Breath sounds: Normal breath sounds. Abdominal:      General: Bowel sounds are normal. There is no distension or abdominal bruit. Palpations: Abdomen is soft. There is no mass. Tenderness: There is no abdominal tenderness. Hernia: No hernia is present. Musculoskeletal:         General: Normal range of motion. Cervical back: Normal range of motion and neck supple. Right lower leg: No edema. Left lower leg: No edema. Lymphadenopathy:      Cervical:      Right cervical: No superficial cervical adenopathy. Left cervical: No superficial cervical adenopathy. Skin:     General: Skin is warm and dry. Coloration: Skin is not jaundiced or pale. Findings: No lesion or rash. Nails: There is no clubbing. Neurological:      Mental Status: He is alert and oriented to person, place, and time. Cranial Nerves: No facial asymmetry. Motor: No weakness or tremor.       Coordination: Coordination normal. Gait: Gait normal.      Deep Tendon Reflexes: Reflexes are normal and symmetric. Psychiatric:         Attention and Perception: Attention normal.         Mood and Affect: Mood normal.         Speech: Speech normal.         Behavior: Behavior normal.         Thought Content: Thought content normal.         Cognition and Memory: Memory normal.         Judgment: Judgment normal.      /70 (Site: Left Upper Arm, Position: Sitting, Cuff Size: Medium Adult)   Pulse 92   Temp 97.5 °F (36.4 °C) (Temporal)   Wt 215 lb (97.5 kg)   SpO2 98%   BMI 35.78 kg/m²      ASSESSMENT:     Diagnosis Orders   1. Prolactin increased-more than likely secondary to antipsychotic use. 2. Severe obesity (BMI 35.0-39. 9) with comorbidity (HCC)-ongoing       3. Other schizophrenia (HCC)-stable            PLAN:    1. Discussed with psychiatry discontinuation of antipsychotic therapy if possible  2. Encouraged weight loss. 3.  Follow-up with psychiatry.   Follow-up 3 months

## 2023-02-09 DIAGNOSIS — Z00.00 WELLNESS EXAMINATION: ICD-10-CM

## 2023-02-09 RX ORDER — LANOLIN ALCOHOL/MO/W.PET/CERES
CREAM (GRAM) TOPICAL
Qty: 60 TABLET | Refills: 11 | Status: SHIPPED | OUTPATIENT
Start: 2023-02-09

## 2023-02-09 RX ORDER — IBUPROFEN 800 MG/1
TABLET ORAL
Qty: 30 TABLET | Refills: 11 | Status: SHIPPED | OUTPATIENT
Start: 2023-02-09

## 2023-02-09 RX ORDER — BENZONATATE 200 MG/1
CAPSULE ORAL
Qty: 20 CAPSULE | Refills: 11 | Status: SHIPPED | OUTPATIENT
Start: 2023-02-09

## 2023-02-09 RX ORDER — POLYETHYLENE GLYCOL 3350 17 G/17G
POWDER, FOR SOLUTION ORAL
Qty: 510 G | Refills: 11 | Status: SHIPPED | OUTPATIENT
Start: 2023-02-09

## 2023-02-09 RX ORDER — LOPERAMIDE HYDROCHLORIDE 2 MG/1
CAPSULE ORAL
Qty: 12 CAPSULE | Refills: 11 | Status: SHIPPED | OUTPATIENT
Start: 2023-02-09

## 2023-02-22 ENCOUNTER — TELEPHONE (OUTPATIENT)
Dept: FAMILY MEDICINE CLINIC | Age: 38
End: 2023-02-22

## 2023-02-22 DIAGNOSIS — R63.4 WEIGHT LOSS: Primary | ICD-10-CM

## 2023-03-17 ENCOUNTER — OFFICE VISIT (OUTPATIENT)
Dept: FAMILY MEDICINE CLINIC | Age: 38
End: 2023-03-17

## 2023-03-17 VITALS
SYSTOLIC BLOOD PRESSURE: 138 MMHG | DIASTOLIC BLOOD PRESSURE: 64 MMHG | HEART RATE: 106 BPM | BODY MASS INDEX: 33.82 KG/M2 | TEMPERATURE: 97.9 F | OXYGEN SATURATION: 98 % | HEIGHT: 65 IN | WEIGHT: 203 LBS

## 2023-03-17 DIAGNOSIS — R73.02 GLUCOSE INTOLERANCE (IMPAIRED GLUCOSE TOLERANCE): ICD-10-CM

## 2023-03-17 DIAGNOSIS — R63.1 POLYDIPSIA: Primary | ICD-10-CM

## 2023-03-17 DIAGNOSIS — R53.83 OTHER FATIGUE: ICD-10-CM

## 2023-03-17 DIAGNOSIS — R63.1 POLYDIPSIA: ICD-10-CM

## 2023-03-17 LAB
ALBUMIN SERPL-MCNC: 3.9 G/DL (ref 3.5–5.2)
ALP SERPL-CCNC: 54 U/L (ref 40–130)
ALT SERPL-CCNC: 10 U/L (ref 5–41)
ANION GAP SERPL CALCULATED.3IONS-SCNC: 12 MMOL/L (ref 7–19)
AST SERPL-CCNC: 14 U/L (ref 5–40)
BILIRUB SERPL-MCNC: <0.2 MG/DL (ref 0.2–1.2)
BILIRUB UR QL STRIP: NEGATIVE
BUN SERPL-MCNC: 9 MG/DL (ref 6–20)
CALCIUM SERPL-MCNC: 9.5 MG/DL (ref 8.6–10)
CHLORIDE SERPL-SCNC: 97 MMOL/L (ref 98–111)
CLARITY UR: CLEAR
CO2 SERPL-SCNC: 29 MMOL/L (ref 22–29)
COLOR UR: YELLOW
CREAT SERPL-MCNC: 0.8 MG/DL (ref 0.5–1.2)
ERYTHROCYTE [DISTWIDTH] IN BLOOD BY AUTOMATED COUNT: 18.1 % (ref 11.5–14.5)
FOLATE SERPL-MCNC: 13.6 NG/ML (ref 4.5–32.2)
GLUCOSE SERPL-MCNC: 106 MG/DL (ref 74–109)
GLUCOSE UR STRIP.AUTO-MCNC: NEGATIVE MG/DL
HBA1C MFR BLD: 5.5 % (ref 4–6)
HCT VFR BLD AUTO: 45.9 % (ref 42–52)
HGB BLD-MCNC: 14.8 G/DL (ref 14–18)
HGB UR STRIP.AUTO-MCNC: NEGATIVE MG/L
KETONES UR STRIP.AUTO-MCNC: NEGATIVE MG/DL
LEUKOCYTE ESTERASE UR QL STRIP.AUTO: NEGATIVE
MCH RBC QN AUTO: 24.5 PG (ref 27–31)
MCHC RBC AUTO-ENTMCNC: 32.2 G/DL (ref 33–37)
MCV RBC AUTO: 76 FL (ref 80–94)
NITRITE UR QL STRIP.AUTO: NEGATIVE
PH UR STRIP.AUTO: 7.5 [PH] (ref 5–8)
PLATELET # BLD AUTO: 224 K/UL (ref 130–400)
PMV BLD AUTO: 11.3 FL (ref 9.4–12.4)
POTASSIUM SERPL-SCNC: 3.6 MMOL/L (ref 3.5–5)
PROT SERPL-MCNC: 7.3 G/DL (ref 6.6–8.7)
PROT UR STRIP.AUTO-MCNC: NEGATIVE MG/DL
RBC # BLD AUTO: 6.04 M/UL (ref 4.7–6.1)
SODIUM SERPL-SCNC: 138 MMOL/L (ref 136–145)
SP GR UR STRIP.AUTO: 1.02 (ref 1–1.03)
TSH SERPL DL<=0.005 MIU/L-ACNC: 0.95 UIU/ML (ref 0.35–5.5)
UROBILINOGEN UR STRIP.AUTO-MCNC: 0.2 E.U./DL
VIT B12 SERPL-MCNC: 444 PG/ML (ref 211–946)
WBC # BLD AUTO: 7.4 K/UL (ref 4.8–10.8)

## 2023-03-17 ASSESSMENT — ENCOUNTER SYMPTOMS
GASTROINTESTINAL NEGATIVE: 1
RESPIRATORY NEGATIVE: 1
EYES NEGATIVE: 1
ALLERGIC/IMMUNOLOGIC NEGATIVE: 1

## 2023-03-17 NOTE — PROGRESS NOTES
SUBJECTIVE:    Patient ID: Flako Villegas is a 30 y. o.male. HPI:   Here for follow-up of multiple medical problems  Is a 62-year-old -American male with mental disabilities. He is here with his care worker. He have history of mental health illness and take multiple antipsychotics. He also have glucose intolerance in the past.  In the recent past he been having polydipsia and increased hunger. He have lost some weight. He feels fatigued. Past Medical History:   Diagnosis Date    Allergic rhinitis     Episodic mood disorder ([facility]     Hyperlipidemia     Hypertension     Mild intellectual disabilities     Morbid obesity with BMI of 40.0-44.9, adult ([facility] 9/18/2019    Schizophrenia ([facility]       Current Outpatient Medications   Medication Sig Dispense Refill    Misc. Devices MISC Daily weights 1 each 0    benzonatate (TESSALON) 200 MG capsule GIVE ONE CAPSULE BY MOUTH EVERY 8 HOURS AS NEEDED FOR COUGH. MAY CAUSE DROWSINESS. 20 capsule 11    polyethylene glycol (GLYCOLAX) 17 GM/SCOOP powder MIX 17GM (1 CAPFUL) IN LIQUID AND GIVE BY MOUTH ONCE DAILY AS NEEDED FOR CONSTIPATION 510 g 11    Calcium 500-2.5 MG-MCG CHEW CHEW 2 TABLETS BY MOUTH ONCE DAILY AS NEEDED FOR HEARTBURN 60 tablet 11    loperamide (IMODIUM) 2 MG capsule GIVE ONE CAPSULE BY MOUTH EVERY 4 HOURS AS NEEDED FOR DIARRHEA LASTING >24 HRS. 12 capsule 11    ibuprofen (ADVIL;MOTRIN) 800 MG tablet GIVE ONE TABLET BY MOUTH EVERY 8 HOURS AS NEEDED FOR PAIN 30 tablet 11    olopatadine (PATADAY) 0.2 % SOLN ophthalmic solution INSTILL ONE DROP IN  BOTH EYES ONCE DAILY AS NEEDED 2.5 mL 11    hydroCHLOROthiazide (MICROZIDE) 12.5 MG capsule GIVE ONE CAPSULE BY MOUTH ONCE DAILY. DX: HTN 30 capsule 11    loratadine (CLARITIN) 10 MG tablet GIVE ONE TABLET BY MOUTH ONCE DAILY.   DX: ALLERGIES 30 tablet 11    omeprazole (PRILOSEC) 20 MG delayed release capsule GIVE ONE CAPSULE BY MOUTH EVERY MORNING (BEFORE BREAKFAST) 30 capsule 11    losartan (COZAAR) 100 MG tablet GIVE ONE TABLET BY MOUTH ONCE DAILY. 30 tablet 11    omeprazole (PRILOSEC OTC) 20 MG tablet Take 1 tablet by mouth daily 30 tablet 3    ondansetron (ZOFRAN ODT) 4 MG disintegrating tablet Take 2 tablets by mouth every 8 hours as needed for Nausea or Vomiting 20 tablet 0    diclofenac sodium (VOLTAREN) 1 % GEL Apply to left knee area up to 4 times daily for pain and inflammation as needed 100 g 5    divalproex (DEPAKOTE) 500 MG DR tablet Take 500 mg by mouth 3 times daily      latanoprost (XALATAN) 0.005 % ophthalmic solution 1 drop nightly      QUEtiapine (SEROQUEL) 300 MG tablet Take 300 mg by mouth 2 times daily      risperiDONE (RISPERDAL M-TABS) 2 MG disintegrating tablet Take 2 mg by mouth 2 times daily      amLODIPine (NORVASC) 10 MG tablet Take 10 mg by mouth daily      meloxicam (MOBIC) 15 MG tablet Take 15 mg by mouth daily       acetaminophen (TYLENOL) 500 MG tablet Take 1,000 mg by mouth every 4 hours as needed for Pain        No current facility-administered medications for this visit. Allergies   Allergen Reactions    Citalopram     Lexapro [Escitalopram Oxalate]     Lisinopril Swelling    Seasonal     Serotonin Reuptake Inhibitors (Ssris)        Review of Systems   Constitutional:  Positive for fatigue. HENT: Negative. Eyes: Negative. Respiratory: Negative. Cardiovascular: Negative. Gastrointestinal: Negative. Endocrine: Positive for polydipsia. Genitourinary: Negative. Musculoskeletal: Negative. Skin: Negative. Allergic/Immunologic: Negative. Neurological: Negative. Hematological: Negative. Psychiatric/Behavioral: Negative. OBJECTIVE:    Physical Exam  Vitals reviewed. Constitutional:       Appearance: Normal appearance. He is well-developed. HENT:      Head: Normocephalic and atraumatic. Right Ear: Tympanic membrane, ear canal and external ear normal. There is no impacted cerumen.       Left Ear: Tympanic membrane, ear canal and external ear normal. There is no impacted cerumen. Nose: Nose normal.      Mouth/Throat:      Lips: Pink. Mouth: Mucous membranes are moist.      Dentition: Normal dentition. Tongue: No lesions. Pharynx: Oropharynx is clear. Uvula midline. Tonsils: No tonsillar exudate or tonsillar abscesses. Eyes:      General: Lids are normal.         Right eye: No discharge. Left eye: No discharge. Extraocular Movements:      Right eye: Normal extraocular motion. Left eye: Normal extraocular motion. Conjunctiva/sclera: Conjunctivae normal.      Right eye: Right conjunctiva is not injected. Left eye: Left conjunctiva is not injected. Pupils: Pupils are equal, round, and reactive to light. Neck:      Thyroid: No thyromegaly. Vascular: No carotid bruit or JVD. Cardiovascular:      Rate and Rhythm: Normal rate and regular rhythm. Pulses:           Carotid pulses are 2+ on the right side and 2+ on the left side. Radial pulses are 2+ on the right side and 2+ on the left side. Heart sounds: Normal heart sounds, S1 normal and S2 normal. No murmur heard. Pulmonary:      Effort: Pulmonary effort is normal. No accessory muscle usage. Breath sounds: Normal breath sounds. Abdominal:      General: Bowel sounds are normal. There is no distension or abdominal bruit. Palpations: Abdomen is soft. There is no mass. Tenderness: There is no abdominal tenderness. Hernia: No hernia is present. Musculoskeletal:         General: Normal range of motion. Cervical back: Normal range of motion and neck supple. Right lower leg: No edema. Left lower leg: No edema. Lymphadenopathy:      Cervical:      Right cervical: No superficial cervical adenopathy. Left cervical: No superficial cervical adenopathy. Skin:     General: Skin is warm and dry. Coloration: Skin is not jaundiced or pale. Findings: No lesion or rash. Nails: There is no clubbing. Neurological:      Mental Status: He is alert and oriented to person, place, and time. Cranial Nerves: No facial asymmetry. Motor: No weakness or tremor. Coordination: Coordination normal.      Gait: Gait normal.      Deep Tendon Reflexes: Reflexes are normal and symmetric. Psychiatric:         Attention and Perception: Attention normal.         Mood and Affect: Mood normal.         Speech: Speech normal.         Behavior: Behavior normal.         Thought Content: Thought content normal.         Cognition and Memory: Memory normal.         Judgment: Judgment normal.      /64 (Site: Left Upper Arm, Position: Sitting, Cuff Size: Medium Adult)   Pulse (!) 106   Temp 97.9 °F (36.6 °C) (Temporal)   Ht 5' 5\" (1.651 m)   Wt 203 lb (92.1 kg)   SpO2 98%   BMI 33.78 kg/m²      ASSESSMENT:     Diagnosis Orders   1. Polydipsia-diabetes versus medication side effect versus psychogenic CBC    Comprehensive Metabolic Panel    Urinalysis with Reflex to Culture    Hemoglobin A1C      2. Other fatigue-need to investigate CBC    Comprehensive Metabolic Panel    TSH with Reflex to FT4    Vitamin B12 & Folate      3. Glucose intolerance (impaired glucose tolerance) versus diabetes Hemoglobin A1C           PLAN:    1. Blood work today. We will treat according to results. If negative for diabetes will recommend for him to discuss this problem with psychiatry. 2.  Blood work.   3.  Blood work will treat if needed  Follow-up 3 months or earlier if blood work-up abnormal

## 2023-04-05 RX ORDER — AMLODIPINE BESYLATE 10 MG/1
TABLET ORAL
Qty: 31 TABLET | Refills: 11 | Status: SHIPPED | OUTPATIENT
Start: 2023-04-05

## 2023-05-05 ENCOUNTER — OFFICE VISIT (OUTPATIENT)
Dept: FAMILY MEDICINE CLINIC | Age: 38
End: 2023-05-05

## 2023-05-05 VITALS
OXYGEN SATURATION: 98 % | WEIGHT: 205 LBS | SYSTOLIC BLOOD PRESSURE: 126 MMHG | TEMPERATURE: 98.3 F | HEIGHT: 65 IN | HEART RATE: 105 BPM | DIASTOLIC BLOOD PRESSURE: 74 MMHG | BODY MASS INDEX: 34.16 KG/M2

## 2023-05-05 DIAGNOSIS — I10 ESSENTIAL HYPERTENSION: ICD-10-CM

## 2023-05-05 DIAGNOSIS — F70 MILD INTELLECTUAL DISABILITY: Primary | ICD-10-CM

## 2023-05-05 DIAGNOSIS — R79.89 PROLACTIN INCREASED: ICD-10-CM

## 2023-05-05 NOTE — PROGRESS NOTES
SUBJECTIVE:    Patient ID: Laurita Park is a 30 y. o.male. HPI:   Patient here for follow-up multiple medical problems  Patient is a 44-year-old male. He have mental disabilities and lives in a group home. He was seen recently secondary to polyphasia and fatigue. Blood work was unremarkable. He sees psychiatry for mental illness and take multiple antipsychotics. He have chronic prolactinemia. He has been doing well according to the staff. His symptoms have improved. He is seeing psychiatry. He also have hypertension. Take blood pressure medicine. Blood pressure is well controlled. Denies medication side effect. Past Medical History:   Diagnosis Date    Allergic rhinitis     Episodic mood disorder ([facility]     Hyperlipidemia     Hypertension     Mild intellectual disabilities     Morbid obesity with BMI of 40.0-44.9, adult ([facility] 9/18/2019    Schizophrenia ([facility]       Current Outpatient Medications   Medication Sig Dispense Refill    amLODIPine (NORVASC) 10 MG tablet GIVE ONE TABLET BY MOUTH ONCE DAILY. DX: HTN 31 tablet 11    Misc. Devices MISC Daily weights 1 each 0    benzonatate (TESSALON) 200 MG capsule GIVE ONE CAPSULE BY MOUTH EVERY 8 HOURS AS NEEDED FOR COUGH. MAY CAUSE DROWSINESS. 20 capsule 11    polyethylene glycol (GLYCOLAX) 17 GM/SCOOP powder MIX 17GM (1 CAPFUL) IN LIQUID AND GIVE BY MOUTH ONCE DAILY AS NEEDED FOR CONSTIPATION 510 g 11    Calcium 500-2.5 MG-MCG CHEW CHEW 2 TABLETS BY MOUTH ONCE DAILY AS NEEDED FOR HEARTBURN 60 tablet 11    loperamide (IMODIUM) 2 MG capsule GIVE ONE CAPSULE BY MOUTH EVERY 4 HOURS AS NEEDED FOR DIARRHEA LASTING >24 HRS. 12 capsule 11    ibuprofen (ADVIL;MOTRIN) 800 MG tablet GIVE ONE TABLET BY MOUTH EVERY 8 HOURS AS NEEDED FOR PAIN 30 tablet 11    olopatadine (PATADAY) 0.2 % SOLN ophthalmic solution INSTILL ONE DROP IN  BOTH EYES ONCE DAILY AS NEEDED 2.5 mL 11    hydroCHLOROthiazide (MICROZIDE) 12.5 MG capsule GIVE ONE CAPSULE BY MOUTH ONCE DAILY.  DX:

## 2023-08-30 ENCOUNTER — HOSPITAL ENCOUNTER (INPATIENT)
Age: 38
LOS: 2 days | Discharge: HOME | End: 2023-09-01
Attending: PEDIATRICS | Admitting: PSYCHIATRY & NEUROLOGY
Payer: MEDICARE

## 2023-08-30 DIAGNOSIS — R45.850 HOMICIDAL IDEATION: Primary | ICD-10-CM

## 2023-08-30 PROBLEM — F22 PARANOID BEHAVIOR (HCC): Status: ACTIVE | Noted: 2023-08-30

## 2023-08-30 LAB
ALBUMIN SERPL-MCNC: 4.3 G/DL (ref 3.5–5.2)
ALP SERPL-CCNC: 56 U/L (ref 40–130)
ALT SERPL-CCNC: 22 U/L (ref 5–41)
AMPHET UR QL SCN: NEGATIVE
ANION GAP SERPL CALCULATED.3IONS-SCNC: 12 MMOL/L (ref 7–19)
APAP SERPL-MCNC: <5 UG/ML (ref 10–30)
AST SERPL-CCNC: 45 U/L (ref 5–40)
BARBITURATES UR QL SCN: NEGATIVE
BASOPHILS # BLD: 0 K/UL (ref 0–0.2)
BASOPHILS NFR BLD: 0.5 % (ref 0–1)
BENZODIAZ UR QL SCN: NEGATIVE
BILIRUB SERPL-MCNC: <0.2 MG/DL (ref 0.2–1.2)
BILIRUB UR QL STRIP: NEGATIVE
BUN SERPL-MCNC: 12 MG/DL (ref 6–20)
BUPRENORPHINE URINE: NEGATIVE
CALCIUM SERPL-MCNC: 9.9 MG/DL (ref 8.6–10)
CANNABINOIDS UR QL SCN: NEGATIVE
CHLORIDE SERPL-SCNC: 98 MMOL/L (ref 98–111)
CLARITY UR: ABNORMAL
CO2 SERPL-SCNC: 28 MMOL/L (ref 22–29)
COCAINE UR QL SCN: NEGATIVE
COLOR UR: YELLOW
CREAT SERPL-MCNC: 1 MG/DL (ref 0.5–1.2)
DRUG SCREEN COMMENT UR-IMP: ABNORMAL
EOSINOPHIL # BLD: 0.1 K/UL (ref 0–0.6)
EOSINOPHIL NFR BLD: 0.7 % (ref 0–5)
ERYTHROCYTE [DISTWIDTH] IN BLOOD BY AUTOMATED COUNT: 15 % (ref 11.5–14.5)
ETHANOLAMINE SERPL-MCNC: <10 MG/DL (ref 0–0.08)
GLUCOSE SERPL-MCNC: 105 MG/DL (ref 74–109)
GLUCOSE UR STRIP.AUTO-MCNC: NEGATIVE MG/DL
HCT VFR BLD AUTO: 46.6 % (ref 42–52)
HGB BLD-MCNC: 15.2 G/DL (ref 14–18)
HGB UR STRIP.AUTO-MCNC: NEGATIVE MG/L
IMM GRANULOCYTES # BLD: 0 K/UL
KETONES UR STRIP.AUTO-MCNC: ABNORMAL MG/DL
LEUKOCYTE ESTERASE UR QL STRIP.AUTO: NEGATIVE
LYMPHOCYTES # BLD: 2.1 K/UL (ref 1.1–4.5)
LYMPHOCYTES NFR BLD: 28.6 % (ref 20–40)
MCH RBC QN AUTO: 25.5 PG (ref 27–31)
MCHC RBC AUTO-ENTMCNC: 32.6 G/DL (ref 33–37)
MCV RBC AUTO: 78.2 FL (ref 80–94)
METHADONE UR QL SCN: NEGATIVE
METHAMPHETAMINE, URINE: NEGATIVE
MONOCYTES # BLD: 0.6 K/UL (ref 0–0.9)
MONOCYTES NFR BLD: 8.4 % (ref 0–10)
NEUTROPHILS # BLD: 4.5 K/UL (ref 1.5–7.5)
NEUTS SEG NFR BLD: 61.7 % (ref 50–65)
NITRITE UR QL STRIP.AUTO: NEGATIVE
OPIATES UR QL SCN: NEGATIVE
OXYCODONE UR QL SCN: NEGATIVE
PCP UR QL SCN: NEGATIVE
PH UR STRIP.AUTO: 7 [PH] (ref 5–8)
PLATELET # BLD AUTO: 295 K/UL (ref 130–400)
PMV BLD AUTO: 11.1 FL (ref 9.4–12.4)
POTASSIUM SERPL-SCNC: 4.3 MMOL/L (ref 3.5–5)
PROPOXYPH UR QL SCN: NEGATIVE
PROT SERPL-MCNC: 8.3 G/DL (ref 6.6–8.7)
PROT UR STRIP.AUTO-MCNC: NEGATIVE MG/DL
RBC # BLD AUTO: 5.96 M/UL (ref 4.7–6.1)
SALICYLATES SERPL-MCNC: <0.3 MG/DL (ref 3–10)
SARS-COV-2 RDRP RESP QL NAA+PROBE: NOT DETECTED
SODIUM SERPL-SCNC: 138 MMOL/L (ref 136–145)
SP GR UR STRIP.AUTO: 1.02 (ref 1–1.03)
TRICYCLIC, URINE: POSITIVE
UROBILINOGEN UR STRIP.AUTO-MCNC: 1 E.U./DL
VALPROATE SERPL-MCNC: 67.1 UG/ML (ref 50–100)
WBC # BLD AUTO: 7.4 K/UL (ref 4.8–10.8)

## 2023-08-30 PROCEDURE — 6370000000 HC RX 637 (ALT 250 FOR IP): Performed by: PSYCHIATRY & NEUROLOGY

## 2023-08-30 PROCEDURE — 82077 ASSAY SPEC XCP UR&BREATH IA: CPT

## 2023-08-30 PROCEDURE — 85025 COMPLETE CBC W/AUTO DIFF WBC: CPT

## 2023-08-30 PROCEDURE — 80179 DRUG ASSAY SALICYLATE: CPT

## 2023-08-30 PROCEDURE — 36415 COLL VENOUS BLD VENIPUNCTURE: CPT

## 2023-08-30 PROCEDURE — 81003 URINALYSIS AUTO W/O SCOPE: CPT

## 2023-08-30 PROCEDURE — 80053 COMPREHEN METABOLIC PANEL: CPT

## 2023-08-30 PROCEDURE — 80164 ASSAY DIPROPYLACETIC ACD TOT: CPT

## 2023-08-30 PROCEDURE — 80143 DRUG ASSAY ACETAMINOPHEN: CPT

## 2023-08-30 PROCEDURE — 80306 DRUG TEST PRSMV INSTRMNT: CPT

## 2023-08-30 PROCEDURE — 87635 SARS-COV-2 COVID-19 AMP PRB: CPT

## 2023-08-30 PROCEDURE — 99285 EMERGENCY DEPT VISIT HI MDM: CPT

## 2023-08-30 PROCEDURE — 1240000000 HC EMOTIONAL WELLNESS R&B

## 2023-08-30 RX ORDER — DIVALPROEX SODIUM 500 MG/1
1500 TABLET, EXTENDED RELEASE ORAL NIGHTLY
Status: DISCONTINUED | OUTPATIENT
Start: 2023-08-30 | End: 2023-09-01 | Stop reason: HOSPADM

## 2023-08-30 RX ORDER — LOSARTAN POTASSIUM 100 MG/1
100 TABLET ORAL DAILY
Status: DISCONTINUED | OUTPATIENT
Start: 2023-08-31 | End: 2023-09-01 | Stop reason: HOSPADM

## 2023-08-30 RX ORDER — RISPERIDONE 1 MG/1
3 TABLET ORAL 2 TIMES DAILY
Status: DISCONTINUED | OUTPATIENT
Start: 2023-08-30 | End: 2023-09-01 | Stop reason: HOSPADM

## 2023-08-30 RX ORDER — PRAZOSIN HYDROCHLORIDE 1 MG/1
1 CAPSULE ORAL 2 TIMES DAILY
Status: DISCONTINUED | OUTPATIENT
Start: 2023-08-30 | End: 2023-09-01 | Stop reason: HOSPADM

## 2023-08-30 RX ORDER — POLYETHYLENE GLYCOL 3350 17 G/17G
17 POWDER, FOR SOLUTION ORAL DAILY PRN
Status: DISCONTINUED | OUTPATIENT
Start: 2023-08-30 | End: 2023-09-01 | Stop reason: HOSPADM

## 2023-08-30 RX ORDER — LATANOPROST 50 UG/ML
1 SOLUTION/ DROPS OPHTHALMIC NIGHTLY
Status: DISCONTINUED | OUTPATIENT
Start: 2023-08-30 | End: 2023-09-01 | Stop reason: HOSPADM

## 2023-08-30 RX ORDER — HYDROCHLOROTHIAZIDE 25 MG/1
12.5 TABLET ORAL DAILY
Status: DISCONTINUED | OUTPATIENT
Start: 2023-08-31 | End: 2023-09-01 | Stop reason: HOSPADM

## 2023-08-30 RX ORDER — BENZTROPINE MESYLATE 1 MG/1
1 TABLET ORAL 2 TIMES DAILY
Status: DISCONTINUED | OUTPATIENT
Start: 2023-08-30 | End: 2023-09-01 | Stop reason: HOSPADM

## 2023-08-30 RX ORDER — RISPERIDONE 3 MG/1
3 TABLET ORAL 2 TIMES DAILY
Status: ON HOLD | COMMUNITY
End: 2023-09-01 | Stop reason: HOSPADM

## 2023-08-30 RX ORDER — CETIRIZINE HYDROCHLORIDE 10 MG/1
10 TABLET ORAL DAILY
Status: DISCONTINUED | OUTPATIENT
Start: 2023-08-31 | End: 2023-09-01 | Stop reason: HOSPADM

## 2023-08-30 RX ORDER — ACETAMINOPHEN 325 MG/1
650 TABLET ORAL EVERY 4 HOURS PRN
Status: DISCONTINUED | OUTPATIENT
Start: 2023-08-30 | End: 2023-09-01 | Stop reason: HOSPADM

## 2023-08-30 RX ORDER — PRAZOSIN HYDROCHLORIDE 1 MG/1
1 CAPSULE ORAL NIGHTLY
COMMUNITY

## 2023-08-30 RX ORDER — RISPERIDONE 1 MG/1
2 TABLET ORAL NIGHTLY
Status: DISCONTINUED | OUTPATIENT
Start: 2023-08-30 | End: 2023-09-01 | Stop reason: HOSPADM

## 2023-08-30 RX ORDER — AMLODIPINE BESYLATE 10 MG/1
10 TABLET ORAL DAILY
Status: DISCONTINUED | OUTPATIENT
Start: 2023-08-31 | End: 2023-09-01 | Stop reason: HOSPADM

## 2023-08-30 RX ORDER — PANTOPRAZOLE SODIUM 40 MG/1
40 TABLET, DELAYED RELEASE ORAL
Status: DISCONTINUED | OUTPATIENT
Start: 2023-08-31 | End: 2023-09-01 | Stop reason: HOSPADM

## 2023-08-30 RX ADMIN — LATANOPROST 1 DROP: 50 SOLUTION OPHTHALMIC at 20:32

## 2023-08-30 RX ADMIN — BENZTROPINE MESYLATE 1 MG: 1 TABLET ORAL at 20:34

## 2023-08-30 RX ADMIN — RISPERIDONE 3 MG: 1 TABLET ORAL at 20:49

## 2023-08-30 RX ADMIN — QUETIAPINE FUMARATE 400 MG: 300 TABLET ORAL at 20:34

## 2023-08-30 RX ADMIN — RISPERIDONE 2 MG: 1 TABLET ORAL at 20:49

## 2023-08-30 RX ADMIN — DIVALPROEX SODIUM 1500 MG: 500 TABLET, FILM COATED, EXTENDED RELEASE ORAL at 20:34

## 2023-08-30 RX ADMIN — PRAZOSIN HYDROCHLORIDE 1 MG: 1 CAPSULE ORAL at 20:34

## 2023-08-30 ASSESSMENT — LIFESTYLE VARIABLES
HOW OFTEN DO YOU HAVE A DRINK CONTAINING ALCOHOL: NEVER
HOW MANY STANDARD DRINKS CONTAINING ALCOHOL DO YOU HAVE ON A TYPICAL DAY: PATIENT DOES NOT DRINK
HOW OFTEN DO YOU HAVE A DRINK CONTAINING ALCOHOL: NEVER
HOW MANY STANDARD DRINKS CONTAINING ALCOHOL DO YOU HAVE ON A TYPICAL DAY: PATIENT DOES NOT DRINK

## 2023-08-30 ASSESSMENT — PATIENT HEALTH QUESTIONNAIRE - PHQ9
1. LITTLE INTEREST OR PLEASURE IN DOING THINGS: 0
2. FEELING DOWN, DEPRESSED OR HOPELESS: 0
SUM OF ALL RESPONSES TO PHQ QUESTIONS 1-9: 0
SUM OF ALL RESPONSES TO PHQ QUESTIONS 1-9: 0
SUM OF ALL RESPONSES TO PHQ9 QUESTIONS 1 & 2: 0
SUM OF ALL RESPONSES TO PHQ QUESTIONS 1-9: 0
SUM OF ALL RESPONSES TO PHQ QUESTIONS 1-9: 0

## 2023-08-30 ASSESSMENT — SLEEP AND FATIGUE QUESTIONNAIRES
DO YOU USE A SLEEP AID: YES
AVERAGE NUMBER OF SLEEP HOURS: 9
DO YOU HAVE DIFFICULTY SLEEPING: NO

## 2023-08-30 ASSESSMENT — PAIN - FUNCTIONAL ASSESSMENT: PAIN_FUNCTIONAL_ASSESSMENT: NONE - DENIES PAIN

## 2023-08-30 NOTE — PROGRESS NOTES
Attempted to call pt's legal guardian, Jeana Guadarrama, to inform her of pt's admission. No answer, voicemail left to return phone call.     Electronically signed by Young Woo RN on 8/30/2023 at 6:34 PM

## 2023-08-30 NOTE — ED NOTES
Pt to ED with his caregiver. Caregiver reports that the pt has been making statements toward her that he is going to kill her. When talking with the pt, he states that his caretaker has changed and she is not the person she used to be. I asked him to explain and he states that \"It's hard to explain\"   he also reports that she is talking bad about him and telling lies.      Ilana Mistry RN  08/30/23 [id number]

## 2023-08-30 NOTE — PROGRESS NOTES
Pt arrived to unit via wheelchair from ED with security and 1:1 sitter. 1:1 sitter discontinued. Q15 minute rounds initiated. Body search completed by Fredo Burton RN and Lexi Gaytan RN with no contraband discovered.      Electronically signed by Rianna Alves RN on 8/30/2023 at 5:52 PM

## 2023-08-30 NOTE — ED PROVIDER NOTES
Ear: External ear normal.      Left Ear: External ear normal.      Nose: Nose normal. No congestion or rhinorrhea. Mouth/Throat:      Mouth: Mucous membranes are moist.      Pharynx: Oropharynx is clear. No oropharyngeal exudate or posterior oropharyngeal erythema. Eyes:      General: No scleral icterus. Conjunctiva/sclera: Conjunctivae normal.      Pupils: Pupils are equal, round, and reactive to light. Cardiovascular:      Rate and Rhythm: Normal rate and regular rhythm. Pulses: Normal pulses. Heart sounds: Normal heart sounds. Pulmonary:      Effort: Pulmonary effort is normal. No respiratory distress. Breath sounds: Normal breath sounds. No stridor. No wheezing, rhonchi or rales. Abdominal:      General: Bowel sounds are normal. There is no distension. Palpations: Abdomen is soft. Tenderness: There is no abdominal tenderness. There is no guarding or rebound. Musculoskeletal:         General: No tenderness or deformity. Cervical back: Neck supple. No rigidity. Right lower leg: No edema. Left lower leg: No edema. Skin:     General: Skin is warm and dry. Capillary Refill: Capillary refill takes less than 2 seconds. Coloration: Skin is not jaundiced. Neurological:      General: No focal deficit present. Mental Status: He is alert and oriented to person, place, and time. Mental status is at baseline. Cranial Nerves: No cranial nerve deficit. Sensory: No sensory deficit. Motor: No weakness. Coordination: Coordination normal.   Psychiatric:         Mood and Affect: Mood is anxious. Speech: Speech normal.         Behavior: Behavior normal. Behavior is cooperative. Thought Content: Thought content includes homicidal ideation. Thought content does not include suicidal ideation. Thought content does not include homicidal or suicidal plan.        DIAGNOSTIC RESULTS         No orders to display

## 2023-08-30 NOTE — ED NOTES
Report to [facility] unit. Pt stable and ready for t'port with security/tech.      Trent Mariscal RN  08/30/23 [id number]

## 2023-08-30 NOTE — PROGRESS NOTES
been initiated: yes  Admission Diagnosis: Paranoid    Does the patient have a guardian or Medical POA: Yes  Has the guardian been notified or Medical POA:     2. Decision to Discharge:   Does not meet criteria for acceptance to   unit due to:     3. Transferred:       Patient was transferred due to:      Other follow up information provided:        Safety Plan:     Safety Plan Completed: N/A - Patient admitted to [facility]    Provided a hard copy of safety plan to the patient:N/A - Patient admitted to [facility]    Explained how to follow the steps of the safety plan with patient:N/A - Patient admitted to [facility]    Discussed Facilitators and Barriers of the safety plan with patient: N/A - Patient admitted to [facility]        Joseline Santizo RN

## 2023-08-31 PROBLEM — F22 PARANOID BEHAVIOR (HCC): Status: RESOLVED | Noted: 2023-08-30 | Resolved: 2023-08-31

## 2023-08-31 PROBLEM — F43.29 ADJUSTMENT DISORDER WITH EMOTIONAL DISTURBANCE: Status: ACTIVE | Noted: 2023-08-31

## 2023-08-31 PROCEDURE — 6370000000 HC RX 637 (ALT 250 FOR IP): Performed by: PSYCHIATRY & NEUROLOGY

## 2023-08-31 PROCEDURE — 1240000000 HC EMOTIONAL WELLNESS R&B

## 2023-08-31 RX ORDER — POLYETHYLENE GLYCOL 3350 17 G
2 POWDER IN PACKET (EA) ORAL
Status: DISCONTINUED | OUTPATIENT
Start: 2023-08-31 | End: 2023-09-01 | Stop reason: HOSPADM

## 2023-08-31 RX ORDER — NICOTINE 21 MG/24HR
1 PATCH, TRANSDERMAL 24 HOURS TRANSDERMAL DAILY
Status: DISCONTINUED | OUTPATIENT
Start: 2023-08-31 | End: 2023-09-01 | Stop reason: HOSPADM

## 2023-08-31 RX ADMIN — DIVALPROEX SODIUM 1500 MG: 500 TABLET, FILM COATED, EXTENDED RELEASE ORAL at 20:27

## 2023-08-31 RX ADMIN — PRAZOSIN HYDROCHLORIDE 1 MG: 1 CAPSULE ORAL at 09:25

## 2023-08-31 RX ADMIN — QUETIAPINE FUMARATE 400 MG: 300 TABLET ORAL at 20:27

## 2023-08-31 RX ADMIN — PANTOPRAZOLE SODIUM 40 MG: 40 TABLET, DELAYED RELEASE ORAL at 06:20

## 2023-08-31 RX ADMIN — CETIRIZINE HYDROCHLORIDE 10 MG: 10 TABLET, FILM COATED ORAL at 09:25

## 2023-08-31 RX ADMIN — RISPERIDONE 2 MG: 1 TABLET ORAL at 20:27

## 2023-08-31 RX ADMIN — LATANOPROST 1 DROP: 50 SOLUTION OPHTHALMIC at 20:27

## 2023-08-31 RX ADMIN — LOSARTAN POTASSIUM 100 MG: 100 TABLET, FILM COATED ORAL at 09:25

## 2023-08-31 RX ADMIN — BENZTROPINE MESYLATE 1 MG: 1 TABLET ORAL at 20:27

## 2023-08-31 RX ADMIN — PRAZOSIN HYDROCHLORIDE 1 MG: 1 CAPSULE ORAL at 20:28

## 2023-08-31 RX ADMIN — BENZTROPINE MESYLATE 1 MG: 1 TABLET ORAL at 09:25

## 2023-08-31 RX ADMIN — RISPERIDONE 3 MG: 1 TABLET ORAL at 20:31

## 2023-08-31 RX ADMIN — HYDROCHLOROTHIAZIDE 12.5 MG: 25 TABLET ORAL at 09:25

## 2023-08-31 RX ADMIN — AMLODIPINE BESYLATE 10 MG: 10 TABLET ORAL at 09:24

## 2023-08-31 RX ADMIN — RISPERIDONE 3 MG: 1 TABLET ORAL at 09:23

## 2023-08-31 NOTE — PROGRESS NOTES
Admission Note      Reason for admission/Target Symptom: Per nursing admission assessment - Reason for Admission: 41 yr old male briought to the ER from [facility]. He was there for an appointment and got very upset with his caretaker because she called him stupid. He reports she didn't act like this before and he's not sure why she is doing it now. Pt reports he has been diagnosed with Paranoid Schizophrenia, Depression and Intellectual Disability. He was in [address] [facility] [address] home up until 2 months ago. Now he lives in an apartment and he has a caregiver that comes 40 hrs a week to assist him. He reports he is med compliant. He denies alcohol and drug use. Pt denies SI/HI/AVH. It was reported per the caregiver that the pt threatened to kill her, but the pt denied it to me. He reports\"I would never hurt her\". He is slightly paranoid. Pt does have his own psychiatrist and therapist.    Diagnoses: Depression NOS  UDS: Tricyclic  BAL:  Neg    SW will meet with treatment team to discuss patient's treatment including care planning, discharge planning, and follow-up needs. Patient has been admitted to [facility] Unit. Treatment team will identify the patient's discharge needs. Appointments will be made for medication management and outpatient therapy/counseling. Pt confirmed the need for ongoing treatment post inpatient stay. Pt was also provided a handout of contact information for drug and alcohol treatment centers and other community support service such as [place], AA, and [facility].

## 2023-08-31 NOTE — PLAN OF CARE
Problem: Chronic Conditions and Co-morbidities  Goal: Patient's chronic conditions and co-morbidity symptoms are monitored and maintained or improved  Outcome: Progressing     Problem: Anxiety  Goal: Will report anxiety at manageable levels  Description: INTERVENTIONS:  1. Administer medication as ordered  2. Teach and rehearse alternative coping skills  3. Provide emotional support with 1:1 interaction with staff  Outcome: Progressing  Flowsheets (Taken 8/31/2023 1837)  Will report anxiety at manageable levels:   Administer medication as ordered   Provide emotional support with 1:1 interaction with staff     Problem: Decision Making  Goal: Pt/Family able to effectively weigh alternatives and participate in decision making related to treatment and care  Description: INTERVENTIONS:  1. Determine when there are differences between patient's view, family's view, and healthcare provider's view of condition  2. Facilitate patient and family articulation of goals for care  3. Help patient and family identify pros/cons of alternative solutions  4. Provide information as requested by patient/family  5. Respect patient/family right to receive or not to receive information  6. Serve as a liaison between patient and family and health care team  7. Initiate Consults from Ethics, Palliative Care or initiate [id number] as is appropriate  Outcome: Progressing  Flowsheets (Taken 8/31/2023 1837)  Patient/family able to effectively weigh alternatives and participate in decision making related to treatment and care:   Provide information as requested by patient/family   Respect patient/family right to receive or not to receive information     Problem: Behavior  Goal: Pt/Family maintain appropriate behavior and adhere to behavioral management agreement, if implemented  Description: INTERVENTIONS:  1. Assess patient/family's coping skills and  non-compliant behavior (including use of illegal substances)  2.  Notify security

## 2023-08-31 NOTE — PROGRESS NOTES
SW was talking with Pt this morning and he made vague statements about wanting to hurt his caregiver as well as comments about family members that would defend him. Family that he was referring to does not live around here. Pt reports that that caregiver talks about him as well as laughs at him. Nurse in charge notify.

## 2023-08-31 NOTE — PROGRESS NOTES
SW met with patient to complete psychosocial and lifetime CSSR-S on this date. Patients long and short-term goals discussed. Patient voiced understanding. Treatment plan sheet signed. Patient verbalized understanding of the treatment plan. Patient participated in goals and objectives of the treatment plan. Patient discussed safety plan with . In the last 6 months has the patient been a danger to self: No  In the last 6 months has the patient been a danger to others: No  Legal Guardian/POA: Yes-Belinda Landers-pt's mother is legal guardian    Activity Assessment:  Skills: pt denies   Talents: pt denies  Interests: pt denies    Provided patient with [employer] handout entitled \"Quitting Smoking. \"  Reviewed handout with patient: addressing dangers of smoking, developing coping skills, and providing basic information about quitting. Patient received all components practical counseling of tobacco practical counseling during the hospital stay.

## 2023-08-31 NOTE — PLAN OF CARE
Group Therapy Note    Date: 8/31/2023  Start Time: 1000  End Time:  1030  Number of Participants: 5    Type of Group: Psychoeducation    Wellness Binder Information  Module Name:  Women's Issues  Session Number:  4    Group Goal for Pt: To raise awareness of how thoughts influence feelings    Notes:  Pt did not attend group discussion. Pt was invited/encouraged. Status After Intervention:      Participation Level:     Participation Quality:       Speech:         Thought Process/Content:       Affective Functioning:       Mood:       Level of consciousness:        Response to Learning:       Endings:     Modes of Intervention:       Discipline Responsible:       Signature:  Gamal Solis

## 2023-08-31 NOTE — PROGRESS NOTES
Behavioral Services  Medicare Certification Upon Admission    I certify that this patient's inpatient psychiatric hospital admission is medically necessary for:    [x] (1) Treatment which could reasonably be expected to improve this patient's condition,       [x] (2) Or for diagnostic study;     AND     [x](2) The inpatient psychiatric services are provided while the individual is under the care of a physician and are included in the individualized plan of care.     Estimated length of stay/service 3-5 days    Plan for post-hospital care TBD    Electronically signed by Darline Cope MD on 8/31/2023 at 12:40 PM
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
## 2023-08-31 NOTE — RESEARCH
Pt did put his grandmother down on a release of information to contact for collateral, but said he doesn't know her contact number for SW to be able to call her at this time.

## 2023-08-31 NOTE — PROGRESS NOTES
Treatment Team Note:    Target Symptoms/Reason for admission: Per nursing admission assessment - Reason for Admission: 41 yr old male briought to the ER from [facility]. He was there for an appointment and got very upset with his caretaker because she called him stupid. He reports she didn't act like this before and he's not sure why she is doing it now. Pt reports he has been diagnosed with Paranoid Schizophrenia, Depression and Intellectual Disability. He was in 1973 [facility] home up until 2 months ago. Now he lives in an apartment and he has a caregiver that comes 40 hrs a week to assist him. He reports he is med compliant. He denies alcohol and drug use. Pt denies SI/HI/AVH. It was reported per the caregiver that the pt threatened to kill her, but the pt denied it to me. He reports\"I would never hurt her\". He is slightly paranoid. Pt does have his own psychiatrist and therapist.    Diagnoses per psych provider: Homicidal ideation [R45.850]  Paranoid behavior ([address]) [F22]    Therapist met with treatment team to discuss patients treatment and discharge plans.     Patient's aftercare plan is: [facility]    Aftercare appointments made: No - SW will make discharge appointments    Pt lives with:  alone with a caretaker    Collateral obtained from: SW will meet with patient to gather information  Collateral obtained on:New admission    Attending groups: No    Behavior: cooperative, reportedly made sexual comments and inappropriate looks to others    Has patient been completing ADL's:  Yes    SI:  patient denies SI  Plan: no   If yes describe: N/A - patient denies plan  HI:  patient denies HI  If present describe: N/A  Delusions: patient denies delusions  If present describe: N/A  Hallucinations: patient denies hallucinations  If present describe: N/A    Patient rates their -- Depression: 1-10:  5  Anxiety:1-10: 0    Sleeping: Fair    Taking medication: Yes    Misc: Tentative discharge Friday APS report done and SW placed all to [facility] and made a report

## 2023-08-31 NOTE — PROGRESS NOTES
[address] [place] [address] Vanessa Pedersen  [place], [address] [facility] ,Phone: [phone] and I spoke with his  Joe Garrett and she will pick him up tomorrow and make sure that he will have another care giver worker.

## 2023-08-31 NOTE — PROGRESS NOTES
Group Note    Date: 08/31/23  Start Time: 8:00 AM   End Time:8:30 AM     Number of Participants: 7    Type of Group: Community/Goal     Patient's Goal:      Notes:  patient didn't attend group    Status After Intervention:      Participation Level:  Active Listener    Participation Quality: Appropriate    Speech:  normal    Thought Process/Content: Logical    Mood:  calm    Level of consciousness:  Alert    Response to Learning: Able to verbalize current knowledge/experience    Modes of Intervention: Education and Support    Discipline Responsible: Behavioral Health Technician     Signature:  Gisell Canales

## 2023-09-01 VITALS
WEIGHT: 192 LBS | OXYGEN SATURATION: 98 % | TEMPERATURE: 97.6 F | DIASTOLIC BLOOD PRESSURE: 80 MMHG | RESPIRATION RATE: 16 BRPM | HEART RATE: 65 BPM | HEIGHT: 64 IN | BODY MASS INDEX: 32.78 KG/M2 | SYSTOLIC BLOOD PRESSURE: 112 MMHG

## 2023-09-01 LAB
25(OH)D3 SERPL-MCNC: 24.4 NG/ML
TSH SERPL DL<=0.005 MIU/L-ACNC: 1.46 UIU/ML (ref 0.35–5.5)
VIT B12 SERPL-MCNC: 518 PG/ML (ref 211–946)

## 2023-09-01 PROCEDURE — 36415 COLL VENOUS BLD VENIPUNCTURE: CPT

## 2023-09-01 PROCEDURE — 82306 VITAMIN D 25 HYDROXY: CPT

## 2023-09-01 PROCEDURE — 5130000000 HC BRIDGE APPOINTMENT

## 2023-09-01 PROCEDURE — 6370000000 HC RX 637 (ALT 250 FOR IP): Performed by: PSYCHIATRY & NEUROLOGY

## 2023-09-01 PROCEDURE — 82607 VITAMIN B-12: CPT

## 2023-09-01 PROCEDURE — 84443 ASSAY THYROID STIM HORMONE: CPT

## 2023-09-01 RX ORDER — HYDROCHLOROTHIAZIDE 12.5 MG/1
12.5 TABLET ORAL DAILY
Qty: 30 TABLET | Refills: 1 | Status: SHIPPED | OUTPATIENT
Start: 2023-09-02

## 2023-09-01 RX ORDER — PANTOPRAZOLE SODIUM 40 MG/1
40 TABLET, DELAYED RELEASE ORAL
Qty: 30 TABLET | Refills: 1 | Status: SHIPPED | OUTPATIENT
Start: 2023-09-02

## 2023-09-01 RX ORDER — ERGOCALCIFEROL 1.25 MG/1
50000 CAPSULE ORAL WEEKLY
Status: DISCONTINUED | OUTPATIENT
Start: 2023-09-01 | End: 2023-09-01 | Stop reason: HOSPADM

## 2023-09-01 RX ORDER — ERGOCALCIFEROL 1.25 MG/1
50000 CAPSULE ORAL WEEKLY
Qty: 4 CAPSULE | Refills: 0 | Status: SHIPPED | OUTPATIENT
Start: 2023-09-01 | End: 2023-11-18

## 2023-09-01 RX ORDER — RISPERIDONE 3 MG/1
3 TABLET ORAL 2 TIMES DAILY
Qty: 60 TABLET | Refills: 1 | Status: SHIPPED | OUTPATIENT
Start: 2023-09-01

## 2023-09-01 RX ORDER — DIVALPROEX SODIUM 500 MG/1
1500 TABLET, EXTENDED RELEASE ORAL NIGHTLY
Qty: 90 TABLET | Refills: 1 | Status: SHIPPED | OUTPATIENT
Start: 2023-09-01

## 2023-09-01 RX ORDER — RISPERIDONE 2 MG/1
2 TABLET ORAL NIGHTLY
Qty: 30 TABLET | Refills: 1 | Status: SHIPPED | OUTPATIENT
Start: 2023-09-01

## 2023-09-01 RX ORDER — AMLODIPINE BESYLATE 10 MG/1
10 TABLET ORAL DAILY
Qty: 30 TABLET | Refills: 1 | Status: SHIPPED | OUTPATIENT
Start: 2023-09-02

## 2023-09-01 RX ADMIN — PANTOPRAZOLE SODIUM 40 MG: 40 TABLET, DELAYED RELEASE ORAL at 06:47

## 2023-09-01 RX ADMIN — CETIRIZINE HYDROCHLORIDE 10 MG: 10 TABLET, FILM COATED ORAL at 07:52

## 2023-09-01 RX ADMIN — AMLODIPINE BESYLATE 10 MG: 10 TABLET ORAL at 07:51

## 2023-09-01 RX ADMIN — BENZTROPINE MESYLATE 1 MG: 1 TABLET ORAL at 07:51

## 2023-09-01 RX ADMIN — RISPERIDONE 3 MG: 1 TABLET ORAL at 07:50

## 2023-09-01 RX ADMIN — PRAZOSIN HYDROCHLORIDE 1 MG: 1 CAPSULE ORAL at 07:50

## 2023-09-01 RX ADMIN — LOSARTAN POTASSIUM 100 MG: 100 TABLET, FILM COATED ORAL at 07:51

## 2023-09-01 RX ADMIN — HYDROCHLOROTHIAZIDE 12.5 MG: 25 TABLET ORAL at 07:51

## 2023-09-01 SDOH — SOCIAL STABILITY: SOCIAL INSECURITY: WITHIN THE LAST YEAR, HAVE YOU BEEN HUMILIATED OR EMOTIONALLY ABUSED IN OTHER WAYS BY YOUR PARTNER OR EX-PARTNER?: NO

## 2023-09-01 SDOH — ECONOMIC STABILITY: HOUSING INSECURITY
IN THE LAST 12 MONTHS, WAS THERE A TIME WHEN YOU DID NOT HAVE A STEADY PLACE TO SLEEP OR SLEPT IN A SHELTER (INCLUDING NOW)?: NO

## 2023-09-01 SDOH — HEALTH STABILITY: PHYSICAL HEALTH: ON AVERAGE, HOW MANY MINUTES DO YOU ENGAGE IN EXERCISE AT THIS LEVEL?: 30 MIN

## 2023-09-01 SDOH — SOCIAL STABILITY: SOCIAL INSECURITY: WITHIN THE LAST YEAR, HAVE YOU BEEN AFRAID OF YOUR PARTNER OR EX-PARTNER?: NO

## 2023-09-01 SDOH — SOCIAL STABILITY: SOCIAL NETWORK: HOW OFTEN DO YOU GET TOGETHER WITH FRIENDS OR RELATIVES?: ONCE A WEEK

## 2023-09-01 SDOH — SOCIAL STABILITY: SOCIAL INSECURITY
WITHIN THE LAST YEAR, HAVE YOU BEEN KICKED, HIT, SLAPPED, OR OTHERWISE PHYSICALLY HURT BY YOUR PARTNER OR EX-PARTNER?: NO

## 2023-09-01 SDOH — SOCIAL STABILITY: SOCIAL NETWORK: ARE YOU MARRIED, WIDOWED, DIVORCED, SEPARATED, NEVER MARRIED, OR LIVING WITH A PARTNER?: NEVER MARRIED

## 2023-09-01 SDOH — ECONOMIC STABILITY: INCOME INSECURITY: HOW HARD IS IT FOR YOU TO PAY FOR THE VERY BASICS LIKE FOOD, HOUSING, MEDICAL CARE, AND HEATING?: NOT HARD AT ALL

## 2023-09-01 SDOH — ECONOMIC STABILITY: TRANSPORTATION INSECURITY
IN THE PAST 12 MONTHS, HAS THE LACK OF TRANSPORTATION KEPT YOU FROM MEDICAL APPOINTMENTS OR FROM GETTING MEDICATIONS?: NO

## 2023-09-01 SDOH — ECONOMIC STABILITY: INCOME INSECURITY: IN THE LAST 12 MONTHS, WAS THERE A TIME WHEN YOU WERE NOT ABLE TO PAY THE MORTGAGE OR RENT ON TIME?: NO

## 2023-09-01 SDOH — HEALTH STABILITY: MENTAL HEALTH
STRESS IS WHEN SOMEONE FEELS TENSE, NERVOUS, ANXIOUS, OR CAN'T SLEEP AT NIGHT BECAUSE THEIR MIND IS TROUBLED. HOW STRESSED ARE YOU?: NOT AT ALL

## 2023-09-01 SDOH — ECONOMIC STABILITY: HOUSING INSECURITY: IN THE LAST 12 MONTHS, HOW MANY PLACES HAVE YOU LIVED?: 2

## 2023-09-01 SDOH — ECONOMIC STABILITY: FOOD INSECURITY: WITHIN THE PAST 12 MONTHS, YOU WORRIED THAT YOUR FOOD WOULD RUN OUT BEFORE YOU GOT MONEY TO BUY MORE.: NEVER TRUE

## 2023-09-01 SDOH — HEALTH STABILITY: MENTAL HEALTH: HOW MANY STANDARD DRINKS CONTAINING ALCOHOL DO YOU HAVE ON A TYPICAL DAY?: PATIENT DOES NOT DRINK

## 2023-09-01 SDOH — ECONOMIC STABILITY: TRANSPORTATION INSECURITY
IN THE PAST 12 MONTHS, HAS LACK OF TRANSPORTATION KEPT YOU FROM MEETINGS, WORK, OR FROM GETTING THINGS NEEDED FOR DAILY LIVING?: NO

## 2023-09-01 SDOH — SOCIAL STABILITY: SOCIAL NETWORK: HOW OFTEN DO YOU ATTEND CHURCH OR RELIGIOUS SERVICES?: NEVER

## 2023-09-01 SDOH — HEALTH STABILITY: PHYSICAL HEALTH: ON AVERAGE, HOW MANY DAYS PER WEEK DO YOU ENGAGE IN MODERATE TO STRENUOUS EXERCISE (LIKE A BRISK WALK)?: 5 DAYS

## 2023-09-01 SDOH — SOCIAL STABILITY: SOCIAL NETWORK
IN A TYPICAL WEEK, HOW MANY TIMES DO YOU TALK ON THE PHONE WITH FAMILY, FRIENDS, OR NEIGHBORS?: MORE THAN THREE TIMES A WEEK

## 2023-09-01 SDOH — ECONOMIC STABILITY: FOOD INSECURITY: WITHIN THE PAST 12 MONTHS, THE FOOD YOU BOUGHT JUST DIDN'T LAST AND YOU DIDN'T HAVE MONEY TO GET MORE.: NEVER TRUE

## 2023-09-01 SDOH — SOCIAL STABILITY: SOCIAL NETWORK: HOW OFTEN DO YOU ATTENT MEETINGS OF THE CLUB OR ORGANIZATION YOU BELONG TO?: NEVER

## 2023-09-01 SDOH — SOCIAL STABILITY: SOCIAL NETWORK
DO YOU BELONG TO ANY CLUBS OR ORGANIZATIONS SUCH AS CHURCH GROUPS UNIONS, FRATERNAL OR ATHLETIC GROUPS, OR SCHOOL GROUPS?: NO

## 2023-09-01 SDOH — HEALTH STABILITY: MENTAL HEALTH: HOW OFTEN DO YOU HAVE A DRINK CONTAINING ALCOHOL?: NEVER

## 2023-09-01 SDOH — SOCIAL STABILITY: SOCIAL INSECURITY
WITHIN THE LAST YEAR, HAVE TO BEEN RAPED OR FORCED TO HAVE ANY KIND OF SEXUAL ACTIVITY BY YOUR PARTNER OR EX-PARTNER?: NO
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
## 2023-09-01 NOTE — PROGRESS NOTES
Discharge Note     Patient is discharging on this date. Patient denies SI, HI, and AVH at this time. Patient reports improvement in behavior and is leaving unit in overall good condition. SW and patient discussed patient's follow up appointments and importance of attending appointments as scheduled, patient voiced understanding and agreement. Patient and SW also discussed patient's safety plan and patient was able to verbally identify: warning signs, coping strategies, places and people that help make the patient feel better/distract negative thoughts, friends/family/agencies/professionals the patient can reach out to in a crisis, and something that is important to the patient/worth living for. Patient was provided the national suicide prevention hotline number (1-979-474-840-136-7533) as well as local community behavioral health [facility]) crisis number for emergencies (8-698-989-4657). Discharge Disposition: home -lives alone      Pt to follow up with:  [facility]  on September 5 , 2023 at 1:00 PM for the intake appointment. Patient will follow up with  [facility] with JEREMIE Ruvalcaba and Audi YOU    On September 7 , 2023   at 9:30 AM for the medication management appointment.      Referral to outpatient tobacco cessation counseling treatment:  Patient refused referral to outpatient tobacco cessation counseling    SW offered to assist patient with transportation, patient declined transportation assistance

## 2023-09-01 NOTE — PROGRESS NOTES
Group Note    Date: 09/01/23  Start Time: 8:00 AM   End Time:8:30 AM     Number of Participants: 6    Type of Group: Community/Goal     Patient's Goal:  Did not attend. Notes:  Patient working on discharge. Status After Intervention:  Improved    Participation Level: Active Listener    Participation Quality: Appropriate    Speech:  normal    Thought Process/Content: Logical    Mood: elevated    Level of consciousness:  Alert    Response to Learning: Able to verbalize/acknowledge new learning, Able to retain information, and Capable of insight    Modes of Intervention: Education and Support    Discipline Responsible: Registered Nurse     Signature:   Edenilson Reno RN

## 2023-09-01 NOTE — DISCHARGE INSTR - DIET

## 2023-09-01 NOTE — PROGRESS NOTES
, Chris Nunez was wanting to know if the Cogentin he was taking needed to be sent to the pharmacy. I spoke with Dr. Italo Bautista who advised that is prescribed for EPS and patient was not experiencing symptoms and medication did not need to be sent in. Spoke with  Chris Nunez at 12:10 pm and advised her of his recommendation.   Nothing further

## 2023-09-01 NOTE — DISCHARGE INSTRUCTIONS
Medications:   Medication Summary Provided. I understand that I should take only the medications on my list.   --why and when I need to take each medication. --which side effects to watch for.   --that I should carry my medication information at all times in case of emergency    Situations. --I will take all medications until discontinued by physician. I will take all my medications to follow up appointments. --check with my physician or pharmacist before taking any new medication, over    the counter product or drink alcohol.   --ask about food, drug and dietary supplement interactions. --discard old lists and update records with medication providers. Behavior Health Follow Up:  Original Referral Source: ED  Discharge Diagnosis:   Recomendations for Level of Care: Follow Up  Patient Status at Discharge: Stable  [facility] Hospital  was: [address] [place] [address]  Aftercare plan faxed:    Faxed by: Social Work staff   Date: 23   Time:   Prescriptions sent with pt.     General Information:   Questions regarding your bill: Call [phone]   Suicide Hotline (Rescue Crisis) 9-925.318.4714   To obtain results of pending studies call Medical Records at: [phone]   For emergencies and 24 hour/7 days a week contact information: [phone]
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
## 2023-09-01 NOTE — PROGRESS NOTES
Group Note    Date: 08/31/23  Start Time: 7:30 PM   End Time:8:00 PM     Number of Participants: 7    Type of Group: Wrap-Up     Patient's Goal:  Go Home    Notes:  see wrap up sheet    Status After Intervention:  Unchanged    Participation Level: Interactive    Participation Quality: Appropriate    Speech:  normal    Thought Process/Content: Logical    Mood:  stable    Level of consciousness:  Alert and Oriented x4    Response to Learning: Progressing to goal    Modes of Intervention: Education and Support    Discipline Responsible: Registered Nurse     Signature:  Tania Hargrove RN

## 2023-09-01 NOTE — PLAN OF CARE
Problem: Chronic Conditions and Co-morbidities  Goal: Patient's chronic conditions and co-morbidity symptoms are monitored and maintained or improved  Outcome: Completed     Problem: Anxiety  Goal: Will report anxiety at manageable levels  Description: INTERVENTIONS:  1. Administer medication as ordered  2. Teach and rehearse alternative coping skills  3. Provide emotional support with 1:1 interaction with staff  Outcome: Completed     Problem: Decision Making  Goal: Pt/Family able to effectively weigh alternatives and participate in decision making related to treatment and care  Description: INTERVENTIONS:  1. Determine when there are differences between patient's view, family's view, and healthcare provider's view of condition  2. Facilitate patient and family articulation of goals for care  3. Help patient and family identify pros/cons of alternative solutions  4. Provide information as requested by patient/family  5. Respect patient/family right to receive or not to receive information  6. Serve as a liaison between patient and family and health care team  7. Initiate Consults from Ethics, Palliative Care or initiate [phone] [place] as is appropriate  Outcome: Completed     Problem: Behavior  Goal: Pt/Family maintain appropriate behavior and adhere to behavioral management agreement, if implemented  Description: INTERVENTIONS:  1. Assess patient/family's coping skills and  non-compliant behavior (including use of illegal substances)  2. Notify security of behavior or suspected illegal substances which indicate the need for search of the family and/or belongings  3. Encourage verbalization of thoughts and concerns in a socially appropriate manner  4. Utilize positive, consistent limit setting strategies supporting safety of patient, staff and others  5. Encourage participation in the decision making process about the behavioral management agreement  6.  If a visitor's behavior poses a threat to safety Completed     Problem: Pain  Goal: Verbalizes/displays adequate comfort level or baseline comfort level  Outcome: Completed     Problem: Safety - Adult  Goal: Free from fall injury  Outcome: Completed

## 2023-09-01 NOTE — PROGRESS NOTES
Behavioral Health   Discharge Note  Bridge Appointment completed: Reviewed Discharge Instructions with patient. Patient verbalizes understanding and agreement with the discharge plan using the teachback method. Referral for Outpatient Tobacco Cessation Counseling, upon discharge (nicolette X if applicable and completed):    ( )  Hospital staff assisted patient to call Quit Line or faxed referral                                   during hospitalization                  ( )  Recognizing danger situations (included triggers and roadblocks), if not completed on admission                    ( )  Coping skills (new ways to manage stress, exercise, relaxation techniques, changing routine, distraction), if not completed on admission                                                           ( )  Basic information about quitting (benefits of quitting, techniques in how to quit, available resources, if not completed on admission  ( ) Referral for counseling faxed to [address] [place] [address]   ( x) Patient refused referral  (x ) Patient refused counseling  ( x) Patient refused smoking cessation medication upon discharge    Vaccinations (nicolette X if applicable and completed):  ( ) Patient states already received influenza vaccine elsewhere  ( ) Patient received influenza vaccine during this hospitalization  ( x) Patient refused influenza vaccine at this time      Pt discharged with followings belongings:       Valuables sent home with patient. Valuables retrieved from [employer] and returned to patient. Patient left department with   via personal vehicle , discharged to home . Patient education on aftercare instructions: yes and reviewed with mother who is legal guardian and with , Desiree Thomas  Patient verbalize understanding of AVS:  Yes. Suicidal Ideations? No Risk of Suicide: No Risk AVH? denies HI?  Negative for homicidal ideation       Status EXAM upon discharge:  Mental Status and Behavioral Exam  Normal:

## 2023-09-03 NOTE — FLOWSHEET NOTE
SW attempted to follow-up with pt after their discharge, but the mailbox was full and could not accept any messages at this time.

## 2023-09-03 NOTE — FLOWSHEET NOTE
SW attempted to call a second time, and it said the mailbox was full and couldn't accept any messages at this time.

## 2023-09-06 ENCOUNTER — TELEPHONE (OUTPATIENT)
Dept: FAMILY MEDICINE CLINIC | Age: 38
End: 2023-09-06

## 2023-09-06 NOTE — TELEPHONE ENCOUNTER
Care Transitions Initial Follow Up Call    Outreach made within 2 business days of discharge: No    Patient: Tea Orona Patient : 1985   MRN: [id number]  Reason for Admission: Homicidal Ideation   Discharge Date: 23       Spoke with: No answer no voicemail     Discharge department/facility: [address] [place] [address]

## 2023-09-13 ENCOUNTER — OFFICE VISIT (OUTPATIENT)
Dept: FAMILY MEDICINE CLINIC | Age: 38
End: 2023-09-13
Payer: MEDICARE

## 2023-09-13 ENCOUNTER — HOSPITAL ENCOUNTER (OUTPATIENT)
Dept: GENERAL RADIOLOGY | Age: 38
Discharge: HOME | End: 2023-09-13
Payer: MEDICARE

## 2023-09-13 VITALS
BODY MASS INDEX: 33.3 KG/M2 | TEMPERATURE: 97.9 F | WEIGHT: 194 LBS | SYSTOLIC BLOOD PRESSURE: 126 MMHG | DIASTOLIC BLOOD PRESSURE: 68 MMHG | HEART RATE: 93 BPM | OXYGEN SATURATION: 100 %

## 2023-09-13 DIAGNOSIS — R63.4 WEIGHT LOSS: ICD-10-CM

## 2023-09-13 DIAGNOSIS — R45.850 HOMICIDAL IDEATION: Primary | ICD-10-CM

## 2023-09-13 LAB
ALBUMIN SERPL-MCNC: 4 G/DL (ref 3.5–5.2)
ALP SERPL-CCNC: 53 U/L (ref 40–130)
ALT SERPL-CCNC: 14 U/L (ref 5–41)
ANION GAP SERPL CALCULATED.3IONS-SCNC: 11 MMOL/L (ref 7–19)
AST SERPL-CCNC: 17 U/L (ref 5–40)
BASOPHILS # BLD: 0.1 K/UL (ref 0–0.2)
BASOPHILS NFR BLD: 0.8 % (ref 0–1)
BILIRUB SERPL-MCNC: 0.3 MG/DL (ref 0.2–1.2)
BUN SERPL-MCNC: 11 MG/DL (ref 6–20)
CALCIUM SERPL-MCNC: 9.4 MG/DL (ref 8.6–10)
CHLORIDE SERPL-SCNC: 99 MMOL/L (ref 98–111)
CO2 SERPL-SCNC: 30 MMOL/L (ref 22–29)
CREAT SERPL-MCNC: 1.1 MG/DL (ref 0.5–1.2)
EOSINOPHIL # BLD: 0.1 K/UL (ref 0–0.6)
EOSINOPHIL NFR BLD: 0.8 % (ref 0–5)
ERYTHROCYTE [DISTWIDTH] IN BLOOD BY AUTOMATED COUNT: 17.1 % (ref 11.5–14.5)
ERYTHROCYTE [SEDIMENTATION RATE] IN BLOOD BY WESTERGREN METHOD: 2 MM/HR (ref 0–10)
GLUCOSE SERPL-MCNC: 74 MG/DL (ref 74–109)
HCT VFR BLD AUTO: 47.6 % (ref 42–52)
HGB BLD-MCNC: 15.2 G/DL (ref 14–18)
HIV-1 P24 AG: NORMAL
HIV1+2 AB SERPLBLD QL IA.RAPID: NORMAL
IMM GRANULOCYTES # BLD: 0 K/UL
LYMPHOCYTES # BLD: 2.3 K/UL (ref 1.1–4.5)
LYMPHOCYTES NFR BLD: 36 % (ref 20–40)
MCH RBC QN AUTO: 25 PG (ref 27–31)
MCHC RBC AUTO-ENTMCNC: 31.9 G/DL (ref 33–37)
MCV RBC AUTO: 78.3 FL (ref 80–94)
MONOCYTES # BLD: 0.5 K/UL (ref 0–0.9)
MONOCYTES NFR BLD: 7.5 % (ref 0–10)
NEUTROPHILS # BLD: 3.5 K/UL (ref 1.5–7.5)
NEUTS SEG NFR BLD: 54.7 % (ref 50–65)
PLATELET # BLD AUTO: 299 K/UL (ref 130–400)
PMV BLD AUTO: 11.3 FL (ref 9.4–12.4)
POTASSIUM SERPL-SCNC: 4.3 MMOL/L (ref 3.5–5)
PROT SERPL-MCNC: 7.9 G/DL (ref 6.6–8.7)
RBC # BLD AUTO: 6.08 M/UL (ref 4.7–6.1)
SODIUM SERPL-SCNC: 140 MMOL/L (ref 136–145)
WBC # BLD AUTO: 6.4 K/UL (ref 4.8–10.8)

## 2023-09-13 PROCEDURE — 4004F PT TOBACCO SCREEN RCVD TLK: CPT | Performed by: FAMILY MEDICINE

## 2023-09-13 PROCEDURE — 3078F DIAST BP <80 MM HG: CPT | Performed by: FAMILY MEDICINE

## 2023-09-13 PROCEDURE — G8417 CALC BMI ABV UP PARAM F/U: HCPCS | Performed by: FAMILY MEDICINE

## 2023-09-13 PROCEDURE — 99214 OFFICE O/P EST MOD 30 MIN: CPT | Performed by: FAMILY MEDICINE

## 2023-09-13 PROCEDURE — 1111F DSCHRG MED/CURRENT MED MERGE: CPT | Performed by: FAMILY MEDICINE

## 2023-09-13 PROCEDURE — 3074F SYST BP LT 130 MM HG: CPT | Performed by: FAMILY MEDICINE

## 2023-09-13 PROCEDURE — G8427 DOCREV CUR MEDS BY ELIG CLIN: HCPCS | Performed by: FAMILY MEDICINE

## 2023-09-13 PROCEDURE — 71046 X-RAY EXAM CHEST 2 VIEWS: CPT

## 2023-09-13 ASSESSMENT — ENCOUNTER SYMPTOMS
RESPIRATORY NEGATIVE: 1
ALLERGIC/IMMUNOLOGIC NEGATIVE: 1
EYES NEGATIVE: 1
GASTROINTESTINAL NEGATIVE: 1
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
## 2023-10-04 RX ORDER — PANTOPRAZOLE SODIUM 40 MG/1
TABLET, DELAYED RELEASE ORAL
Qty: 31 TABLET | Refills: 11 | OUTPATIENT
Start: 2023-10-04

## 2023-10-06 RX ORDER — OMEPRAZOLE 20 MG/1
CAPSULE, DELAYED RELEASE ORAL
Qty: 31 CAPSULE | Refills: 11 | Status: SHIPPED | OUTPATIENT
Start: 2023-10-06

## 2023-10-06 RX ORDER — LOSARTAN POTASSIUM 100 MG/1
TABLET ORAL
Qty: 31 TABLET | Refills: 11 | Status: SHIPPED | OUTPATIENT
Start: 2023-10-06

## 2023-10-06 RX ORDER — LORATADINE 10 MG/1
TABLET ORAL
Qty: 31 TABLET | Refills: 11 | Status: SHIPPED | OUTPATIENT
Start: 2023-10-06

## 2023-11-01 ENCOUNTER — TELEPHONE (OUTPATIENT)
Dept: FAMILY MEDICINE CLINIC | Age: 38
End: 2023-11-01

## 2023-11-01 NOTE — TELEPHONE ENCOUNTER
----- Message from Anne Bryant sent at 10/31/2023  2:06 PM CDT -----  Subject: Message to Provider    QUESTIONS  Information for Provider?  requesting patient last annual   visit notes. Please fax to [phone]  ---------------------------------------------------------------------------  --------------  [address]  [phone]; OK to leave message on voicemail  ---------------------------------------------------------------------------  --------------  SCRIPT ANSWERS  Relationship to Patient? Covered Entity  Covered Entity Type? Other  Other Covered Entity Type? - Critical access hospital Medicare Waiver  Representative Name?  Opal Lopez

## 2023-11-02 RX ORDER — PANTOPRAZOLE SODIUM 40 MG/1
TABLET, DELAYED RELEASE ORAL
Qty: 31 TABLET | Refills: 11 | Status: SHIPPED | OUTPATIENT
Start: 2023-11-02

## 2023-11-02 RX ORDER — HYDROCHLOROTHIAZIDE 12.5 MG/1
CAPSULE, GELATIN COATED ORAL
Qty: 31 CAPSULE | Refills: 11 | Status: SHIPPED | OUTPATIENT
Start: 2023-11-02

## 2023-11-08 ENCOUNTER — OFFICE VISIT (OUTPATIENT)
Dept: FAMILY MEDICINE CLINIC | Age: 38
End: 2023-11-08

## 2023-11-08 VITALS
HEIGHT: 65 IN | DIASTOLIC BLOOD PRESSURE: 58 MMHG | SYSTOLIC BLOOD PRESSURE: 130 MMHG | TEMPERATURE: 98.4 F | HEART RATE: 109 BPM | WEIGHT: 206 LBS | BODY MASS INDEX: 34.32 KG/M2 | OXYGEN SATURATION: 98 %

## 2023-11-08 DIAGNOSIS — R73.02 GLUCOSE INTOLERANCE (IMPAIRED GLUCOSE TOLERANCE): ICD-10-CM

## 2023-11-08 DIAGNOSIS — F70 MILD INTELLECTUAL DISABILITY: ICD-10-CM

## 2023-11-08 DIAGNOSIS — I10 ESSENTIAL HYPERTENSION: Primary | ICD-10-CM

## 2023-11-08 DIAGNOSIS — I10 ESSENTIAL HYPERTENSION: ICD-10-CM

## 2023-11-08 DIAGNOSIS — Z23 NEED FOR INFLUENZA VACCINATION: ICD-10-CM

## 2023-11-08 LAB
ERYTHROCYTE [DISTWIDTH] IN BLOOD BY AUTOMATED COUNT: 16.5 % (ref 11.5–14.5)
HBA1C MFR BLD: 5.6 % (ref 4–6)
HCT VFR BLD AUTO: 45.3 % (ref 42–52)
HGB BLD-MCNC: 14.6 G/DL (ref 14–18)
MCH RBC QN AUTO: 24.9 PG (ref 27–31)
MCHC RBC AUTO-ENTMCNC: 32.2 G/DL (ref 33–37)
MCV RBC AUTO: 77.3 FL (ref 80–94)
PLATELET # BLD AUTO: 238 K/UL (ref 130–400)
PMV BLD AUTO: 11.2 FL (ref 9.4–12.4)
RBC # BLD AUTO: 5.86 M/UL (ref 4.7–6.1)
WBC # BLD AUTO: 6.2 K/UL (ref 4.8–10.8)

## 2023-11-08 NOTE — PROGRESS NOTES
SUBJECTIVE:    Patient ID: Star Cardenas is a 41 y.o.male. HPI:   Patient here for follow-up  Patient is a 61-year-old -American male. He have hypertension. He takes blood pressure medication. Blood pressure is well controlled. He is due for blood work. He also have mental disabilities. He lives in a group home. He has some adjustment disorders and takes multiple antipsychotics prescribed by psychiatry. He has been stable. He have history of glucose intolerance. He was losing some weight earlier but his weights have stabilized. Past Medical History:   Diagnosis Date    Allergic rhinitis     Episodic mood disorder ([facility])     Hyperlipidemia     Hypertension     Mild intellectual disabilities     Morbid obesity with BMI of 40.0-44.9, adult ([address] [facility]) 9/18/2019    Schizophrenia ([facility])       Current Outpatient Medications   Medication Sig Dispense Refill    pantoprazole (PROTONIX) 40 MG tablet GIVE ONE TABLET BY MOUTH EVERY MORNING BEFORE BREAKFAST 31 tablet 11    hydroCHLOROthiazide (MICROZIDE) 12.5 MG capsule GIVE ONE CAPSULE BY MOUTH ONCE DAILY. DX: HTN 31 capsule 11    loratadine (CLARITIN) 10 MG tablet GIVE ONE TABLET BY MOUTH ONCE DAILY. DX: ALLERGIES 31 tablet 11    losartan (COZAAR) 100 MG tablet GIVE ONE TABLET BY MOUTH ONCE DAILY.  31 tablet 11    omeprazole (PRILOSEC) 20 MG delayed release capsule GIVE ONE CAPSULE BY MOUTH EVERY MORNING (BEFORE BREAKFAST) 31 capsule 11    hydroCHLOROthiazide (HYDRODIURIL) 12.5 MG tablet Take 1 tablet by mouth daily 30 tablet 1    amLODIPine (NORVASC) 10 MG tablet Take 1 tablet by mouth daily 30 tablet 1    risperiDONE (RISPERDAL) 2 MG tablet Take 1 tablet by mouth nightly 30 tablet 1    risperiDONE (RISPERDAL) 3 MG tablet Take 1 tablet by mouth 2 times daily 60 tablet 1    divalproex (DEPAKOTE ER) 500 MG extended release tablet Take 3 tablets by mouth nightly 90 tablet 1    Ergocalciferol (VITAMIN D) 02157 units CAPS Take 50,000 Units by mouth once a

## 2023-11-09 LAB
ALBUMIN SERPL-MCNC: 4.1 G/DL (ref 3.5–5.2)
ALP SERPL-CCNC: 52 U/L (ref 40–130)
ALT SERPL-CCNC: 12 U/L (ref 5–41)
ANION GAP SERPL CALCULATED.3IONS-SCNC: 14 MMOL/L (ref 7–19)
AST SERPL-CCNC: 15 U/L (ref 5–40)
BILIRUB SERPL-MCNC: <0.2 MG/DL (ref 0.2–1.2)
BUN SERPL-MCNC: 17 MG/DL (ref 6–20)
CALCIUM SERPL-MCNC: 9.4 MG/DL (ref 8.6–10)
CHLORIDE SERPL-SCNC: 98 MMOL/L (ref 98–111)
CO2 SERPL-SCNC: 25 MMOL/L (ref 22–29)
CREAT SERPL-MCNC: 0.7 MG/DL (ref 0.5–1.2)
GLUCOSE SERPL-MCNC: 83 MG/DL (ref 74–109)
POTASSIUM SERPL-SCNC: 4.4 MMOL/L (ref 3.5–5)
PROT SERPL-MCNC: 7.3 G/DL (ref 6.6–8.7)
SODIUM SERPL-SCNC: 137 MMOL/L (ref 136–145)
TSH SERPL DL<=0.005 MIU/L-ACNC: 0.91 UIU/ML (ref 0.27–4.2)

## 2023-11-10 LAB
CHOLEST SERPL-MCNC: 218 MG/DL (ref 160–199)
HDLC SERPL-MCNC: 66 MG/DL (ref 55–121)
LDLC SERPL CALC-MCNC: 108 MG/DL
TRIGL SERPL-MCNC: 218 MG/DL (ref 0–149)

## 2024-01-30 ENCOUNTER — HOSPITAL ENCOUNTER (INPATIENT)
Age: 39
LOS: 7 days | Discharge: HOME | DRG: 885 | End: 2024-02-06
Attending: EMERGENCY MEDICINE | Admitting: PSYCHIATRY & NEUROLOGY
Payer: MEDICARE

## 2024-01-30 DIAGNOSIS — F22 PARANOIA (HCC): Primary | ICD-10-CM

## 2024-01-30 LAB
ALBUMIN SERPL-MCNC: 4.4 G/DL (ref 3.5–5.2)
ALP SERPL-CCNC: 58 U/L (ref 40–130)
ALT SERPL-CCNC: 15 U/L (ref 5–41)
AMPHET UR QL SCN: NEGATIVE
ANION GAP SERPL CALCULATED.3IONS-SCNC: 13 MMOL/L (ref 7–19)
AST SERPL-CCNC: 16 U/L (ref 5–40)
BARBITURATES UR QL SCN: NEGATIVE
BASOPHILS # BLD: 0.1 K/UL (ref 0–0.2)
BASOPHILS NFR BLD: 0.7 % (ref 0–1)
BENZODIAZ UR QL SCN: NEGATIVE
BILIRUB SERPL-MCNC: <0.2 MG/DL (ref 0.2–1.2)
BILIRUB UR QL STRIP: NEGATIVE
BUN SERPL-MCNC: 11 MG/DL (ref 6–20)
BUPRENORPHINE URINE: NEGATIVE
CALCIUM SERPL-MCNC: 9.7 MG/DL (ref 8.6–10)
CANNABINOIDS UR QL SCN: NEGATIVE
CHLORIDE SERPL-SCNC: 98 MMOL/L (ref 98–111)
CLARITY UR: NORMAL
CO2 SERPL-SCNC: 23 MMOL/L (ref 22–29)
COCAINE UR QL SCN: NEGATIVE
COLOR UR: YELLOW
CREAT SERPL-MCNC: 0.9 MG/DL (ref 0.5–1.2)
DRUG SCREEN COMMENT UR-IMP: NORMAL
EOSINOPHIL # BLD: 0.1 K/UL (ref 0–0.6)
EOSINOPHIL NFR BLD: 0.7 % (ref 0–5)
ERYTHROCYTE [DISTWIDTH] IN BLOOD BY AUTOMATED COUNT: 14.6 % (ref 11.5–14.5)
ETHANOLAMINE SERPL-MCNC: <10 MG/DL (ref 0–0.08)
FENTANYL SCREEN, URINE: NEGATIVE
GLUCOSE SERPL-MCNC: 153 MG/DL (ref 74–109)
GLUCOSE UR STRIP.AUTO-MCNC: NEGATIVE MG/DL
HCT VFR BLD AUTO: 51 % (ref 42–52)
HGB BLD-MCNC: 16.6 G/DL (ref 14–18)
HGB UR STRIP.AUTO-MCNC: NEGATIVE MG/L
IMM GRANULOCYTES # BLD: 0 K/UL
KETONES UR STRIP.AUTO-MCNC: NEGATIVE MG/DL
LEUKOCYTE ESTERASE UR QL STRIP.AUTO: NEGATIVE
LYMPHOCYTES # BLD: 2 K/UL (ref 1.1–4.5)
LYMPHOCYTES NFR BLD: 27 % (ref 20–40)
MCH RBC QN AUTO: 25 PG (ref 27–31)
MCHC RBC AUTO-ENTMCNC: 32.5 G/DL (ref 33–37)
MCV RBC AUTO: 76.9 FL (ref 80–94)
METHADONE UR QL SCN: NEGATIVE
METHAMPHETAMINE, URINE: NEGATIVE
MONOCYTES # BLD: 0.5 K/UL (ref 0–0.9)
MONOCYTES NFR BLD: 6.9 % (ref 0–10)
NEUTROPHILS # BLD: 4.7 K/UL (ref 1.5–7.5)
NEUTS SEG NFR BLD: 64.4 % (ref 50–65)
NITRITE UR QL STRIP.AUTO: NEGATIVE
OPIATES UR QL SCN: NEGATIVE
OXYCODONE UR QL SCN: NEGATIVE
PCP UR QL SCN: NEGATIVE
PH UR STRIP.AUTO: 7 [PH] (ref 5–8)
PLATELET # BLD AUTO: 225 K/UL (ref 130–400)
PMV BLD AUTO: 10.2 FL (ref 9.4–12.4)
POTASSIUM SERPL-SCNC: 3.8 MMOL/L (ref 3.5–5)
PROT SERPL-MCNC: 8.2 G/DL (ref 6.6–8.7)
PROT UR STRIP.AUTO-MCNC: NEGATIVE MG/DL
RBC # BLD AUTO: 6.63 M/UL (ref 4.7–6.1)
SARS-COV-2 RDRP RESP QL NAA+PROBE: NOT DETECTED
SODIUM SERPL-SCNC: 134 MMOL/L (ref 136–145)
SP GR UR STRIP.AUTO: 1.02 (ref 1–1.03)
TRICYCLIC, URINE: NEGATIVE
UROBILINOGEN UR STRIP.AUTO-MCNC: 0.2 E.U./DL
WBC # BLD AUTO: 7.2 K/UL (ref 4.8–10.8)

## 2024-01-30 PROCEDURE — G0480 DRUG TEST DEF 1-7 CLASSES: HCPCS

## 2024-01-30 PROCEDURE — 80307 DRUG TEST PRSMV CHEM ANLYZR: CPT

## 2024-01-30 PROCEDURE — 1240000000 HC EMOTIONAL WELLNESS R&B

## 2024-01-30 PROCEDURE — 99285 EMERGENCY DEPT VISIT HI MDM: CPT

## 2024-01-30 PROCEDURE — 36415 COLL VENOUS BLD VENIPUNCTURE: CPT

## 2024-01-30 PROCEDURE — 81003 URINALYSIS AUTO W/O SCOPE: CPT

## 2024-01-30 PROCEDURE — 6370000000 HC RX 637 (ALT 250 FOR IP): Performed by: EMERGENCY MEDICINE

## 2024-01-30 PROCEDURE — 6370000000 HC RX 637 (ALT 250 FOR IP): Performed by: PSYCHIATRY & NEUROLOGY

## 2024-01-30 PROCEDURE — 82077 ASSAY SPEC XCP UR&BREATH IA: CPT

## 2024-01-30 PROCEDURE — 85025 COMPLETE CBC W/AUTO DIFF WBC: CPT

## 2024-01-30 PROCEDURE — 87635 SARS-COV-2 COVID-19 AMP PRB: CPT

## 2024-01-30 PROCEDURE — 80053 COMPREHEN METABOLIC PANEL: CPT

## 2024-01-30 RX ORDER — HYDROXYZINE HYDROCHLORIDE 25 MG/1
25 TABLET, FILM COATED ORAL 3 TIMES DAILY PRN
Status: DISCONTINUED | OUTPATIENT
Start: 2024-01-30 | End: 2024-02-06 | Stop reason: HOSPADM

## 2024-01-30 RX ORDER — POLYETHYLENE GLYCOL 3350 17 G
2 POWDER IN PACKET (EA) ORAL
Status: DISCONTINUED | OUTPATIENT
Start: 2024-01-30 | End: 2024-02-06 | Stop reason: HOSPADM

## 2024-01-30 RX ORDER — NICOTINE 21 MG/24HR
1 PATCH, TRANSDERMAL 24 HOURS TRANSDERMAL DAILY
Status: DISCONTINUED | OUTPATIENT
Start: 2024-01-30 | End: 2024-02-03

## 2024-01-30 RX ORDER — BENZONATATE 200 MG/1
200 CAPSULE ORAL 3 TIMES DAILY PRN
Status: ON HOLD | COMMUNITY
End: 2024-02-05 | Stop reason: HOSPADM

## 2024-01-30 RX ORDER — MECOBALAMIN 5000 MCG
5 TABLET,DISINTEGRATING ORAL NIGHTLY
Status: DISCONTINUED | OUTPATIENT
Start: 2024-01-30 | End: 2024-02-06 | Stop reason: HOSPADM

## 2024-01-30 RX ORDER — POLYETHYLENE GLYCOL 3350 17 G/17G
17 POWDER, FOR SOLUTION ORAL DAILY PRN
Status: DISCONTINUED | OUTPATIENT
Start: 2024-01-30 | End: 2024-02-06 | Stop reason: HOSPADM

## 2024-01-30 RX ORDER — TRAZODONE HYDROCHLORIDE 50 MG/1
50 TABLET ORAL NIGHTLY
Status: DISCONTINUED | OUTPATIENT
Start: 2024-01-30 | End: 2024-02-01

## 2024-01-30 RX ORDER — ACETAMINOPHEN 325 MG/1
650 TABLET ORAL EVERY 4 HOURS PRN
Status: DISCONTINUED | OUTPATIENT
Start: 2024-01-30 | End: 2024-02-06 | Stop reason: HOSPADM

## 2024-01-30 RX ORDER — CLONIDINE HYDROCHLORIDE 0.1 MG/1
0.1 TABLET ORAL ONCE
Status: COMPLETED | OUTPATIENT
Start: 2024-01-30 | End: 2024-01-30

## 2024-01-30 RX ADMIN — Medication 5 MG: at 21:10

## 2024-01-30 RX ADMIN — CLONIDINE HYDROCHLORIDE 0.1 MG: 0.1 TABLET ORAL at 12:51

## 2024-01-30 RX ADMIN — TRAZODONE HYDROCHLORIDE 50 MG: 50 TABLET ORAL at 21:10

## 2024-01-30 RX ADMIN — HYDROXYZINE HYDROCHLORIDE 25 MG: 25 TABLET, FILM COATED ORAL at 21:10

## 2024-01-30 SDOH — ECONOMIC STABILITY: INCOME INSECURITY: IN THE PAST 12 MONTHS, HAS THE ELECTRIC, GAS, OIL, OR WATER COMPANY THREATENED TO SHUT OFF SERVICE IN YOUR HOME?: NO

## 2024-01-30 SDOH — ECONOMIC STABILITY: INCOME INSECURITY: HOW HARD IS IT FOR YOU TO PAY FOR THE VERY BASICS LIKE FOOD, HOUSING, MEDICAL CARE, AND HEATING?: NOT HARD AT ALL

## 2024-01-30 SDOH — ECONOMIC STABILITY: FOOD INSECURITY: WITHIN THE PAST 12 MONTHS, YOU WORRIED THAT YOUR FOOD WOULD RUN OUT BEFORE YOU GOT MONEY TO BUY MORE.: NEVER TRUE

## 2024-01-30 ASSESSMENT — PATIENT HEALTH QUESTIONNAIRE - PHQ9
SUM OF ALL RESPONSES TO PHQ QUESTIONS 1-9: 0
SUM OF ALL RESPONSES TO PHQ9 QUESTIONS 1 & 2: 0
SUM OF ALL RESPONSES TO PHQ QUESTIONS 1-9: 0
SUM OF ALL RESPONSES TO PHQ QUESTIONS 1-9: 0
SUM OF ALL RESPONSES TO PHQ9 QUESTIONS 1 & 2: 0
2. FEELING DOWN, DEPRESSED OR HOPELESS: 0
SUM OF ALL RESPONSES TO PHQ QUESTIONS 1-9: 0
SUM OF ALL RESPONSES TO PHQ QUESTIONS 1-9: 0
1. LITTLE INTEREST OR PLEASURE IN DOING THINGS: 0
SUM OF ALL RESPONSES TO PHQ QUESTIONS 1-9: 0
2. FEELING DOWN, DEPRESSED OR HOPELESS: 0
SUM OF ALL RESPONSES TO PHQ QUESTIONS 1-9: 0
SUM OF ALL RESPONSES TO PHQ QUESTIONS 1-9: 0
1. LITTLE INTEREST OR PLEASURE IN DOING THINGS: 0

## 2024-01-30 ASSESSMENT — SLEEP AND FATIGUE QUESTIONNAIRES
DO YOU HAVE DIFFICULTY SLEEPING: NO
DO YOU USE A SLEEP AID: YES
SLEEP PATTERN: NIGHTMARES/TERRORS

## 2024-01-30 ASSESSMENT — LIFESTYLE VARIABLES
HOW MANY STANDARD DRINKS CONTAINING ALCOHOL DO YOU HAVE ON A TYPICAL DAY: PATIENT DOES NOT DRINK
HOW OFTEN DO YOU HAVE A DRINK CONTAINING ALCOHOL: NEVER

## 2024-01-30 NOTE — CARE COORDINATION
Review of patient chart, Important Message to Medicare form reviewed by KATE nurse with patient, patient's signature noted on form, the signed form is in the patient's chart.    Electronically signed, Angeline Mclean LPN, -Utilization Review , 01/30/24 4:20 PM

## 2024-01-30 NOTE — ED NOTES
Pt changed in purple scrubs and belongings removed from room. Secured by security. Sitter at bedside

## 2024-01-30 NOTE — ED NOTES
ED TO INPATIENT SBAR HANDOFF    Patient Name: Troy Landers   : 1985  38 y.o.   Family/Caregiver Present: Yes  Code Status Order: Prior    C-SSRS: Risk of Suicide: No Risk  Sitter Yes  Restraints:         Situation  Chief Complaint   Patient presents with    Mental Health Problem     Patient has not been taking his medications; patient currently denies SI/HI, patient states he has nightmares that feels like he can't wake up, admits to possibly seeing or hearing things that are not there      Brief Description of Patient's Condition:    Mental Status: oriented, alert, coherent, logical, thought processes intact, and able to concentrate and follow conversation  Arrived from: home    Imaging:   No orders to display     COVID-19 Results:   Internal Administration   First Dose COVID-19, PFIZER PURPLE top, DILUTE for use, (age 12 y+), 30mcg/0.3mL  2021   Second Dose COVID-19, PFIZER PURPLE top, DILUTE for use, (age 12 y+), 30mcg/0.3mL   2021       Last COVID Lab SARS-CoV-2, NAAT (no units)   Date Value   2024 Not Detected     Rapid HIV 1&2 (no units)   Date Value   2023 Non-reactive           Abnormal labs:   Abnormal Labs Reviewed   CBC WITH AUTO DIFFERENTIAL - Abnormal; Notable for the following components:       Result Value    RBC 6.63 (*)     MCV 76.9 (*)     MCH 25.0 (*)     MCHC 32.5 (*)     RDW 14.6 (*)     All other components within normal limits   COMPREHENSIVE METABOLIC PANEL - Abnormal; Notable for the following components:    Sodium 134 (*)     Glucose 153 (*)     All other components within normal limits     Background  Allergies:   Allergies   Allergen Reactions    Citalopram     Lexapro [Escitalopram Oxalate]     Lisinopril Swelling    Seasonal     Serotonin Reuptake Inhibitors (Ssris)      Current Medications:   Medications Administered         cloNIDine (CATAPRES) tablet 0.1 mg Admin Date  2024 Action  Given Dose  0.1 mg Route  Oral Administered By  Goodman

## 2024-01-30 NOTE — BH NOTE
Chart Review: Guardianship legal documentation is in the patient's chart on the unit, will be scanned into patient's chart upon discharge. Mother, Belinda Landers, legal guardian.    Electronically signed, Angleine Mclean LPN, -Utilization Review , 01/30/24 4:17 PM

## 2024-01-30 NOTE — ED PROVIDER NOTES
[facility] EMERGENCY DEPT  eMERGENCY dEPARTMENT eNCOUnter      Pt Name: Troy Landers  MRN: [id number]  Birthdate 1985  Date of evaluation: 1/30/2024  Provider: Avel Giron MD    CHIEF COMPLAINT       Chief Complaint   Patient presents with    Mental Health Problem     Patient has not been taking his medications; patient currently denies SI/HI, patient states he has nightmares that feels like he can't wake up, admits to possibly seeing or hearing things that are not there          HISTORY OF PRESENT ILLNESS   (Location/Symptom, Timing/Onset,Context/Setting, Quality, Duration, Modifying Factors, Severity)  Note limiting factors.   Troy Landers is a 38 y.o. male who presents to the emergency department for evaluation regarding feelings of paranoia.  Patient states that he has been seeing and hearing things that are not really there.  He states he has very vivid dreams that feel like nightmares and he just cannot wake up.  He states that he has been not taking his medication as directed for the past several days.  He presents here to the ED with his family.  Patient with a prior history of inpatient psychiatric admission in August 2023.  He notes a prior history of paranoid schizophrenia, depression and mild intellectual disability.    HPI    NursingNotes were reviewed.    REVIEW OF SYSTEMS    (2-9 systems for level 4, 10 or more for level 5)     Review of Systems   Constitutional:  Negative for chills, fatigue and fever.   Respiratory:  Negative for shortness of breath.    Cardiovascular:  Negative for chest pain and palpitations.   Gastrointestinal:  Negative for abdominal pain, nausea and vomiting.   Psychiatric/Behavioral:  Positive for hallucinations and sleep disturbance. Negative for agitation and suicidal ideas. The patient is nervous/anxious.    All other systems reviewed and are negative.           PAST MEDICALHISTORY     Past Medical History:   Diagnosis Date    Allergic rhinitis     Episodic mood disorder

## 2024-01-31 PROBLEM — F20.0 SCHIZOPHRENIA, PARANOID (HCC): Status: ACTIVE | Noted: 2024-01-31

## 2024-01-31 PROBLEM — F43.12 CHRONIC POST-TRAUMATIC STRESS DISORDER (PTSD): Status: ACTIVE | Noted: 2024-01-31

## 2024-01-31 PROBLEM — F17.200 TOBACCO USE DISORDER: Status: ACTIVE | Noted: 2024-01-31

## 2024-01-31 PROBLEM — F22 PARANOID BEHAVIOR (HCC): Status: RESOLVED | Noted: 2024-01-30 | Resolved: 2024-01-31

## 2024-01-31 PROCEDURE — 90792 PSYCH DIAG EVAL W/MED SRVCS: CPT | Performed by: NURSE PRACTITIONER

## 2024-01-31 PROCEDURE — 6370000000 HC RX 637 (ALT 250 FOR IP): Performed by: NURSE PRACTITIONER

## 2024-01-31 PROCEDURE — 6360000002 HC RX W HCPCS: Performed by: NURSE PRACTITIONER

## 2024-01-31 PROCEDURE — 1240000000 HC EMOTIONAL WELLNESS R&B

## 2024-01-31 PROCEDURE — 6370000000 HC RX 637 (ALT 250 FOR IP): Performed by: PSYCHIATRY & NEUROLOGY

## 2024-01-31 RX ORDER — PANTOPRAZOLE SODIUM 40 MG/1
40 TABLET, DELAYED RELEASE ORAL
Status: DISCONTINUED | OUTPATIENT
Start: 2024-02-01 | End: 2024-02-06 | Stop reason: HOSPADM

## 2024-01-31 RX ORDER — CETIRIZINE HYDROCHLORIDE 10 MG/1
10 TABLET ORAL DAILY
Status: DISCONTINUED | OUTPATIENT
Start: 2024-01-31 | End: 2024-02-06 | Stop reason: HOSPADM

## 2024-01-31 RX ORDER — ONDANSETRON 4 MG/1
4 TABLET, ORALLY DISINTEGRATING ORAL EVERY 8 HOURS PRN
Status: ON HOLD | COMMUNITY
End: 2024-02-05 | Stop reason: HOSPADM

## 2024-01-31 RX ORDER — LOPERAMIDE HYDROCHLORIDE 2 MG/1
2 CAPSULE ORAL EVERY 4 HOURS PRN
Status: ON HOLD | COMMUNITY
End: 2024-02-05 | Stop reason: HOSPADM

## 2024-01-31 RX ORDER — PRAZOSIN HYDROCHLORIDE 2 MG/1
2 CAPSULE ORAL NIGHTLY
Status: DISCONTINUED | OUTPATIENT
Start: 2024-01-31 | End: 2024-02-06 | Stop reason: HOSPADM

## 2024-01-31 RX ORDER — LORAZEPAM 2 MG/ML
2 INJECTION INTRAMUSCULAR ONCE
Status: COMPLETED | OUTPATIENT
Start: 2024-01-31 | End: 2024-01-31

## 2024-01-31 RX ORDER — HALOPERIDOL 5 MG/ML
5 INJECTION INTRAMUSCULAR ONCE
Status: COMPLETED | OUTPATIENT
Start: 2024-01-31 | End: 2024-01-31

## 2024-01-31 RX ORDER — RISPERIDONE 1 MG/1
2 TABLET ORAL 2 TIMES DAILY
Status: DISCONTINUED | OUTPATIENT
Start: 2024-01-31 | End: 2024-02-06 | Stop reason: HOSPADM

## 2024-01-31 RX ORDER — HALOPERIDOL 5 MG/1
5 TABLET ORAL ONCE
Status: DISCONTINUED | OUTPATIENT
Start: 2024-01-31 | End: 2024-01-31

## 2024-01-31 RX ORDER — DIVALPROEX SODIUM 500 MG/1
1500 TABLET, EXTENDED RELEASE ORAL NIGHTLY
Status: DISCONTINUED | OUTPATIENT
Start: 2024-01-31 | End: 2024-02-06 | Stop reason: HOSPADM

## 2024-01-31 RX ORDER — QUETIAPINE FUMARATE 100 MG/1
100 TABLET, FILM COATED ORAL NIGHTLY
Status: ON HOLD | COMMUNITY
End: 2024-02-05 | Stop reason: HOSPADM

## 2024-01-31 RX ORDER — QUETIAPINE FUMARATE 300 MG/1
300 TABLET, FILM COATED ORAL NIGHTLY
Status: DISCONTINUED | OUTPATIENT
Start: 2024-01-31 | End: 2024-02-06 | Stop reason: HOSPADM

## 2024-01-31 RX ORDER — BENZTROPINE MESYLATE 1 MG/1
1 TABLET ORAL 2 TIMES DAILY
Status: ON HOLD | COMMUNITY
End: 2024-02-05 | Stop reason: HOSPADM

## 2024-01-31 RX ORDER — HALOPERIDOL 5 MG/ML
2 INJECTION INTRAMUSCULAR ONCE
Status: DISCONTINUED | OUTPATIENT
Start: 2024-01-31 | End: 2024-01-31

## 2024-01-31 RX ORDER — QUETIAPINE FUMARATE 200 MG/1
400 TABLET, FILM COATED ORAL NIGHTLY
Status: DISCONTINUED | OUTPATIENT
Start: 2024-01-31 | End: 2024-01-31

## 2024-01-31 RX ORDER — AMLODIPINE BESYLATE 10 MG/1
10 TABLET ORAL DAILY
Status: DISCONTINUED | OUTPATIENT
Start: 2024-01-31 | End: 2024-02-06 | Stop reason: HOSPADM

## 2024-01-31 RX ADMIN — PRAZOSIN HYDROCHLORIDE 2 MG: 2 CAPSULE ORAL at 21:31

## 2024-01-31 RX ADMIN — RISPERIDONE 2 MG: 1 TABLET, FILM COATED ORAL at 21:35

## 2024-01-31 RX ADMIN — TRAZODONE HYDROCHLORIDE 50 MG: 50 TABLET ORAL at 21:31

## 2024-01-31 RX ADMIN — DIVALPROEX SODIUM 1500 MG: 500 TABLET, FILM COATED, EXTENDED RELEASE ORAL at 21:31

## 2024-01-31 RX ADMIN — LORAZEPAM 2 MG: 2 INJECTION INTRAMUSCULAR; INTRAVENOUS at 16:40

## 2024-01-31 RX ADMIN — RISPERIDONE 2 MG: 1 TABLET, FILM COATED ORAL at 12:56

## 2024-01-31 RX ADMIN — CETIRIZINE HYDROCHLORIDE 10 MG: 10 TABLET, FILM COATED ORAL at 12:56

## 2024-01-31 RX ADMIN — QUETIAPINE FUMARATE 300 MG: 300 TABLET ORAL at 21:31

## 2024-01-31 RX ADMIN — HYDROXYZINE HYDROCHLORIDE 25 MG: 25 TABLET, FILM COATED ORAL at 15:21

## 2024-01-31 RX ADMIN — AMLODIPINE BESYLATE 10 MG: 10 TABLET ORAL at 15:19

## 2024-01-31 RX ADMIN — Medication 5 MG: at 21:33

## 2024-01-31 RX ADMIN — HALOPERIDOL LACTATE 5 MG: 5 INJECTION, SOLUTION INTRAMUSCULAR at 16:40

## 2024-01-31 ASSESSMENT — LIFESTYLE VARIABLES
HOW OFTEN DO YOU HAVE A DRINK CONTAINING ALCOHOL: NEVER
HOW MANY STANDARD DRINKS CONTAINING ALCOHOL DO YOU HAVE ON A TYPICAL DAY: PATIENT DOES NOT DRINK

## 2024-01-31 NOTE — GROUP NOTE
Group Therapy Note    Date: 1/31/2024    Group Start Time: 0800  Group End Time: 0830  Group Topic: Community Meeting    MH 6 ADULT Cathy Etienne RN; Mely Huang    Group Therapy Note                                                                    Group Note    Date: 01/31/24  Start Time: 8:00 AM   End Time:8:30 AM     Number of Participants: 13    Type of Group: Community/Goal     Patient's Goal:      Notes: pt encouraged to participate in group, pt refused    Modes of Intervention: Education and Support    Discipline Responsible: Behavioral Health Technician     Signature:  Cathy Leon RN    Electronically signed by Cathy Leon RN on 1/31/2024 at 8:52 AM

## 2024-01-31 NOTE — DISCHARGE INSTRUCTIONS
link families and individuals with disabilities to valuable resources that will enable them to live productive, fulfilling lives.    [place] [address] [address] [address]  [phone]  Behavioral Health Counseling Services: Adult Services; Children’s Services; Medical Services; Substance Abuse; Testing & Assessment; Case Management Services; Supported Employment; Behavior Support: Intellectual or Developmental Disability Respite Care: Intellectual or Developmental Disability; In-Home Supports; Outreach and Education; IMPACT; Crisis Services; Day Training Program; Consumer Operated Services; Housing Supports; Medicaid Waiver Programs; First Steps; Assertive Community; [place]-Moms Maternal Assistance Toward Recovery; Consultation and Education; Employee Assistance Programs; Court Services; Hospital Services    Legal Help Line (Seniors & Disabled)  County: [place], [place], [place], [place], [place] [address] [address]  [phone] -or- [phone]  The mission of [place]  is to assist and enable low income families, elderly, disabled and other vulnerable individuals in [facility] and [facility] to resolve legal problems that are barriers to self sufficiency, and to provide these individuals an opportunity for an improved quality of life.    [facility]  [address] [facility], [address] [place] [address] [phone] / Fax: [phone]  TDD: [phone]  www.-c-e-c.org   Serves individuals with Intellectual and Developmental Disabilities. We are a multi-service agency as we focus on servicing people with various disabilities and have many programs intended for the disability population.    [facility]  [phone]  Crisis line for rape victims. Addictions Services, Adult Behavioral Health, Children’s Behavioral Health, Crisis Services, Developmental/Intellectual Disability Services,

## 2024-02-01 PROCEDURE — 99232 SBSQ HOSP IP/OBS MODERATE 35: CPT | Performed by: NURSE PRACTITIONER

## 2024-02-01 PROCEDURE — 1240000000 HC EMOTIONAL WELLNESS R&B

## 2024-02-01 PROCEDURE — 6370000000 HC RX 637 (ALT 250 FOR IP): Performed by: NURSE PRACTITIONER

## 2024-02-01 PROCEDURE — 6370000000 HC RX 637 (ALT 250 FOR IP): Performed by: PSYCHIATRY & NEUROLOGY

## 2024-02-01 RX ORDER — TRAZODONE HYDROCHLORIDE 50 MG/1
50 TABLET ORAL NIGHTLY PRN
Status: DISCONTINUED | OUTPATIENT
Start: 2024-02-01 | End: 2024-02-02

## 2024-02-01 RX ADMIN — RISPERIDONE 2 MG: 1 TABLET, FILM COATED ORAL at 08:03

## 2024-02-01 RX ADMIN — CETIRIZINE HYDROCHLORIDE 10 MG: 10 TABLET, FILM COATED ORAL at 08:03

## 2024-02-01 RX ADMIN — Medication 5 MG: at 20:39

## 2024-02-01 RX ADMIN — AMLODIPINE BESYLATE 10 MG: 10 TABLET ORAL at 08:04

## 2024-02-01 RX ADMIN — QUETIAPINE FUMARATE 300 MG: 300 TABLET ORAL at 20:40

## 2024-02-01 RX ADMIN — PRAZOSIN HYDROCHLORIDE 2 MG: 2 CAPSULE ORAL at 20:39

## 2024-02-01 RX ADMIN — PANTOPRAZOLE SODIUM 40 MG: 40 TABLET, DELAYED RELEASE ORAL at 06:05

## 2024-02-01 RX ADMIN — DIVALPROEX SODIUM 1500 MG: 500 TABLET, FILM COATED, EXTENDED RELEASE ORAL at 20:39

## 2024-02-01 RX ADMIN — RISPERIDONE 2 MG: 1 TABLET, FILM COATED ORAL at 20:39

## 2024-02-01 NOTE — H&P
HISTORY and PHYSICAL      CHIEF COMPLAINT:  Psychosis    Reason for Admission:  Psychosis    History Obtained From:  patient, chart    HISTORY OF PRESENT ILLNESS:      The patient is a 38 y.o. male who is admitted to the [facility] unit with worsening mood issues. He has no c/o chest pain or SOA. He has had no abdominal pain or N/V. He has had no dysuria. He has had no HA or dizziness. He has had no fevers. No new pain issues.     Past Medical History:        Diagnosis Date    Allergic rhinitis     Chronic post-traumatic stress disorder (PTSD) 1/31/2024    Episodic mood disorder (Formerly [facility])     Hyperlipidemia     Hypertension     Mild intellectual disabilities     Morbid obesity with BMI of 40.0-44.9, adult (Formerly [facility]) 9/18/2019    Schizophrenia (Formerly [facility])      Past Surgical History:    History reviewed. No pertinent surgical history.      Medications Prior to Admission:    Medications Prior to Admission: QUEtiapine (SEROQUEL) 100 MG tablet, Take 1 tablet by mouth nightly Give with 400 mg tablet to equal 500 mg  benztropine (COGENTIN) 1 MG tablet, Take 1 tablet by mouth 2 times daily  Cholecalciferol (VITAMIN D3) 25 MCG (1000 UT) CAPS, Take 1 capsule by mouth daily  loperamide (IMODIUM) 2 MG capsule, Take 1 capsule by mouth every 4 hours as needed for Diarrhea  ondansetron (ZOFRAN-ODT) 4 MG disintegrating tablet, Take 1 tablet by mouth every 8 hours as needed for Nausea or Vomiting  benzonatate (TESSALON) 200 MG capsule, Take 1 capsule by mouth 3 times daily as needed for Cough  pantoprazole (PROTONIX) 40 MG tablet, GIVE ONE TABLET BY MOUTH EVERY MORNING BEFORE BREAKFAST  loratadine (CLARITIN) 10 MG tablet, GIVE ONE TABLET BY MOUTH ONCE DAILY.  DX: ALLERGIES  losartan (COZAAR) 100 MG tablet, GIVE ONE TABLET BY MOUTH ONCE DAILY.  omeprazole (PRILOSEC) 20 MG delayed release capsule, GIVE ONE CAPSULE BY MOUTH EVERY MORNING (BEFORE BREAKFAST)  hydroCHLOROthiazide (HYDRODIURIL) 12.5 MG 
      Behavioral Services  Medicare Certification Upon Admission    I certify that this patient's inpatient psychiatric hospital admission is medically necessary for:    [x] (1) Treatment which could reasonably be expected to improve this patient's condition,       [x] (2) Or for diagnostic study;     AND     [x](2) The inpatient psychiatric services are provided while the individual is under the care of a physician and are included in the individualized plan of care.    Estimated length of stay/service : 3 days-5 weeks    Plan for post-hospital care :TBD    Electronically signed by AVELINO Cancino on 1/31/2024 at 10:27 AM      
examiner:  cooperative, attentive, and good eye contact  Speech:  normal rate and normal volume  Mood:  \" I am feeling tired today.\"  Affect:  mood congruent  Thought processes:  linear and goal directed  Thought content:  Homocidal ideation : denies  Suicidal Ideation:  denies suicidal ideation  Delusions:  paranoid  Perceptual Disturbance:  auditory and visual  Cognition:  oriented to person, place, and time  Concentration : good  Memory intact for recent and remote  Fund of knowledge: below average  Abstract thinking: limited  Insight:  limited  Judgment:  limited         Review of Systems:  History obtained from the patient  General ROS: positive for  - sleep disturbance  Psychological ROS: positive for - anxiety and psychosis  Ophthalmic ROS: negative  ENT ROS: negative  Allergy and Immunology ROS: negative  Hematological and Lymphatic ROS: negative  Endocrine ROS: negative  Breast ROS: negative  Respiratory ROS: no cough, shortness of breath, or wheezing  Cardiovascular ROS: no chest pain or dyspnea on exertion  Gastrointestinal ROS: no abdominal pain, change in bowel habits, or black or bloody stools  Genito-Urinary ROS: no dysuria, trouble voiding, or hematuria  Musculoskeletal ROS: negative  Neurological ROS: no TIA or stroke symptoms, CN II-XII grossly intact, no abnormal movements or tremors   Dermatological ROS: negative      DSM V Diagnoses:    Schizophrenia, paranoid type  Mild Intellectual Disability  Chronic PTSD  Tobacco use disorder      Recommendations:  1. Admit to [facility] Unit and monitor on 15 min checks  2. Killian to be reviewed.  3. Collateral information from family with release  4. Medical monitoring by Dr Johnston and associates  5. Acclimate to the unit and encourage group attendance   6. Legal Status: Voluntary  7. Precautions: Suicide  8. Diet: Regular  9. Will resume all prior psychotropic medications at recommended doses including Depakote 1,500 mg po daily, Quetiapine 300 mg po

## 2024-02-02 LAB — 25(OH)D3 SERPL-MCNC: 32.6 NG/ML

## 2024-02-02 PROCEDURE — 36415 COLL VENOUS BLD VENIPUNCTURE: CPT

## 2024-02-02 PROCEDURE — 1240000000 HC EMOTIONAL WELLNESS R&B

## 2024-02-02 PROCEDURE — 82306 VITAMIN D 25 HYDROXY: CPT

## 2024-02-02 PROCEDURE — 6370000000 HC RX 637 (ALT 250 FOR IP): Performed by: PSYCHIATRY & NEUROLOGY

## 2024-02-02 PROCEDURE — 6370000000 HC RX 637 (ALT 250 FOR IP): Performed by: NURSE PRACTITIONER

## 2024-02-02 PROCEDURE — 99232 SBSQ HOSP IP/OBS MODERATE 35: CPT

## 2024-02-02 RX ORDER — TRAZODONE HYDROCHLORIDE 100 MG/1
100 TABLET ORAL NIGHTLY PRN
Status: DISCONTINUED | OUTPATIENT
Start: 2024-02-02 | End: 2024-02-06 | Stop reason: HOSPADM

## 2024-02-02 RX ADMIN — HYDROXYZINE HYDROCHLORIDE 25 MG: 25 TABLET, FILM COATED ORAL at 18:01

## 2024-02-02 RX ADMIN — Medication 5 MG: at 20:36

## 2024-02-02 RX ADMIN — RISPERIDONE 2 MG: 1 TABLET, FILM COATED ORAL at 08:44

## 2024-02-02 RX ADMIN — PRAZOSIN HYDROCHLORIDE 2 MG: 2 CAPSULE ORAL at 20:35

## 2024-02-02 RX ADMIN — DIVALPROEX SODIUM 1500 MG: 500 TABLET, FILM COATED, EXTENDED RELEASE ORAL at 20:35

## 2024-02-02 RX ADMIN — AMLODIPINE BESYLATE 10 MG: 10 TABLET ORAL at 08:45

## 2024-02-02 RX ADMIN — RISPERIDONE 2 MG: 1 TABLET, FILM COATED ORAL at 20:36

## 2024-02-02 RX ADMIN — HYDROXYZINE HYDROCHLORIDE 25 MG: 25 TABLET, FILM COATED ORAL at 01:08

## 2024-02-02 RX ADMIN — CETIRIZINE HYDROCHLORIDE 10 MG: 10 TABLET, FILM COATED ORAL at 08:45

## 2024-02-02 RX ADMIN — TRAZODONE HYDROCHLORIDE 50 MG: 50 TABLET ORAL at 01:08

## 2024-02-02 RX ADMIN — QUETIAPINE FUMARATE 300 MG: 300 TABLET ORAL at 20:35

## 2024-02-02 RX ADMIN — PANTOPRAZOLE SODIUM 40 MG: 40 TABLET, DELAYED RELEASE ORAL at 06:26

## 2024-02-03 PROCEDURE — 6370000000 HC RX 637 (ALT 250 FOR IP): Performed by: NURSE PRACTITIONER

## 2024-02-03 PROCEDURE — 99232 SBSQ HOSP IP/OBS MODERATE 35: CPT | Performed by: PSYCHIATRY & NEUROLOGY

## 2024-02-03 PROCEDURE — 1240000000 HC EMOTIONAL WELLNESS R&B

## 2024-02-03 PROCEDURE — 6370000000 HC RX 637 (ALT 250 FOR IP): Performed by: PSYCHIATRY & NEUROLOGY

## 2024-02-03 PROCEDURE — 6370000000 HC RX 637 (ALT 250 FOR IP)

## 2024-02-03 RX ORDER — NICOTINE 21 MG/24HR
1 PATCH, TRANSDERMAL 24 HOURS TRANSDERMAL DAILY
Status: DISCONTINUED | OUTPATIENT
Start: 2024-02-04 | End: 2024-02-06 | Stop reason: HOSPADM

## 2024-02-03 RX ADMIN — ACETAMINOPHEN 650 MG: 325 TABLET ORAL at 23:58

## 2024-02-03 RX ADMIN — DIVALPROEX SODIUM 1500 MG: 500 TABLET, FILM COATED, EXTENDED RELEASE ORAL at 20:40

## 2024-02-03 RX ADMIN — RISPERIDONE 2 MG: 1 TABLET, FILM COATED ORAL at 20:40

## 2024-02-03 RX ADMIN — PRAZOSIN HYDROCHLORIDE 2 MG: 2 CAPSULE ORAL at 20:40

## 2024-02-03 RX ADMIN — PANTOPRAZOLE SODIUM 40 MG: 40 TABLET, DELAYED RELEASE ORAL at 06:32

## 2024-02-03 RX ADMIN — AMLODIPINE BESYLATE 10 MG: 10 TABLET ORAL at 08:13

## 2024-02-03 RX ADMIN — QUETIAPINE FUMARATE 300 MG: 300 TABLET ORAL at 20:40

## 2024-02-03 RX ADMIN — Medication 5 MG: at 20:40

## 2024-02-03 RX ADMIN — RISPERIDONE 2 MG: 1 TABLET, FILM COATED ORAL at 08:13

## 2024-02-03 RX ADMIN — CETIRIZINE HYDROCHLORIDE 10 MG: 10 TABLET, FILM COATED ORAL at 08:13

## 2024-02-03 RX ADMIN — TRAZODONE HYDROCHLORIDE 100 MG: 100 TABLET ORAL at 23:14

## 2024-02-03 ASSESSMENT — PAIN DESCRIPTION - LOCATION: LOCATION: OTHER (COMMENT)

## 2024-02-04 PROCEDURE — 6370000000 HC RX 637 (ALT 250 FOR IP): Performed by: PSYCHIATRY & NEUROLOGY

## 2024-02-04 PROCEDURE — 1240000000 HC EMOTIONAL WELLNESS R&B

## 2024-02-04 PROCEDURE — 6370000000 HC RX 637 (ALT 250 FOR IP): Performed by: NURSE PRACTITIONER

## 2024-02-04 PROCEDURE — 6370000000 HC RX 637 (ALT 250 FOR IP)

## 2024-02-04 RX ADMIN — Medication 5 MG: at 20:31

## 2024-02-04 RX ADMIN — DIVALPROEX SODIUM 1500 MG: 500 TABLET, FILM COATED, EXTENDED RELEASE ORAL at 20:31

## 2024-02-04 RX ADMIN — TRAZODONE HYDROCHLORIDE 100 MG: 100 TABLET ORAL at 21:25

## 2024-02-04 RX ADMIN — QUETIAPINE FUMARATE 300 MG: 300 TABLET ORAL at 20:31

## 2024-02-04 RX ADMIN — CETIRIZINE HYDROCHLORIDE 10 MG: 10 TABLET, FILM COATED ORAL at 08:20

## 2024-02-04 RX ADMIN — AMLODIPINE BESYLATE 10 MG: 10 TABLET ORAL at 08:20

## 2024-02-04 RX ADMIN — RISPERIDONE 2 MG: 1 TABLET, FILM COATED ORAL at 20:31

## 2024-02-04 RX ADMIN — PANTOPRAZOLE SODIUM 40 MG: 40 TABLET, DELAYED RELEASE ORAL at 06:28

## 2024-02-04 RX ADMIN — PRAZOSIN HYDROCHLORIDE 2 MG: 2 CAPSULE ORAL at 20:31

## 2024-02-04 RX ADMIN — RISPERIDONE 2 MG: 1 TABLET, FILM COATED ORAL at 08:20

## 2024-02-05 PROCEDURE — 5130000000 HC BRIDGE APPOINTMENT

## 2024-02-05 PROCEDURE — 6370000000 HC RX 637 (ALT 250 FOR IP)

## 2024-02-05 PROCEDURE — 1240000000 HC EMOTIONAL WELLNESS R&B

## 2024-02-05 PROCEDURE — 6370000000 HC RX 637 (ALT 250 FOR IP): Performed by: PSYCHIATRY & NEUROLOGY

## 2024-02-05 PROCEDURE — 6370000000 HC RX 637 (ALT 250 FOR IP): Performed by: NURSE PRACTITIONER

## 2024-02-05 RX ORDER — DIVALPROEX SODIUM 500 MG/1
1500 TABLET, EXTENDED RELEASE ORAL NIGHTLY
Qty: 90 TABLET | Refills: 0 | Status: SHIPPED | OUTPATIENT
Start: 2024-02-05

## 2024-02-05 RX ORDER — RISPERIDONE 2 MG/1
2 TABLET ORAL 2 TIMES DAILY
Qty: 60 TABLET | Refills: 0 | Status: SHIPPED | OUTPATIENT
Start: 2024-02-05

## 2024-02-05 RX ORDER — QUETIAPINE FUMARATE 300 MG/1
300 TABLET, FILM COATED ORAL NIGHTLY
Qty: 30 TABLET | Refills: 0 | Status: SHIPPED | OUTPATIENT
Start: 2024-02-05

## 2024-02-05 RX ORDER — TRAZODONE HYDROCHLORIDE 100 MG/1
100 TABLET ORAL NIGHTLY PRN
Qty: 30 TABLET | Refills: 0 | Status: SHIPPED | OUTPATIENT
Start: 2024-02-05

## 2024-02-05 RX ADMIN — RISPERIDONE 2 MG: 1 TABLET, FILM COATED ORAL at 09:36

## 2024-02-05 RX ADMIN — TRAZODONE HYDROCHLORIDE 100 MG: 100 TABLET ORAL at 20:47

## 2024-02-05 RX ADMIN — HYDROXYZINE HYDROCHLORIDE 25 MG: 25 TABLET, FILM COATED ORAL at 20:47

## 2024-02-05 RX ADMIN — AMLODIPINE BESYLATE 10 MG: 10 TABLET ORAL at 09:36

## 2024-02-05 RX ADMIN — PANTOPRAZOLE SODIUM 40 MG: 40 TABLET, DELAYED RELEASE ORAL at 06:25

## 2024-02-05 RX ADMIN — RISPERIDONE 2 MG: 1 TABLET, FILM COATED ORAL at 20:47

## 2024-02-05 RX ADMIN — QUETIAPINE FUMARATE 300 MG: 300 TABLET ORAL at 20:47

## 2024-02-05 RX ADMIN — CETIRIZINE HYDROCHLORIDE 10 MG: 10 TABLET, FILM COATED ORAL at 09:36

## 2024-02-05 RX ADMIN — PRAZOSIN HYDROCHLORIDE 2 MG: 2 CAPSULE ORAL at 20:47

## 2024-02-05 RX ADMIN — DIVALPROEX SODIUM 1500 MG: 500 TABLET, FILM COATED, EXTENDED RELEASE ORAL at 20:47

## 2024-02-05 RX ADMIN — Medication 5 MG: at 20:47

## 2024-02-05 NOTE — PLAN OF CARE
Group Therapy Note    Date: 2/2/2024  Start Time: 1100  End Time:  1130  Number of Participants: 7    Type of Group: Psychoeducation    Wellness Binder Information  Module Name:  staying well  Session Number:  1    Patient's Goal:  daily maintenance and coping skills    Notes:  pt acknowledged use of positive coping skills daily to help stay well.    Status After Intervention:  Improved    Participation Level: Active Listener and Minimal    Participation Quality: Attentive      Speech:  hesitant      Thought Process/Content: Linear      Affective Functioning: Blunted      Mood: congruent      Level of consciousness:  Attentive      Response to Learning: Progressing to goal      Endings: None Reported    Modes of Intervention: Education      Discipline Responsible: Psychoeducational Specialist      Signature:  Ester Walsh    
                                                                    Group Therapy Note    Date: 2/5/2024  Start Time: 1000  End Time:  1030  Number of Participants: 15    Type of Group: Psychoeducation    Wellness Binder Information  Module Name:  Relapse Prevention  Session Number:  5    Group Goal for Pt:  To improve knowledge of relapse prevention strategies    Notes:  Pt did not attend group discussion.  Pt was invited/encouraged.    Status After Intervention:      Participation Level:     Participation Quality:       Speech:        Thought Process/Content:       Affective Functioning:       Mood:       Level of consciousness:        Response to Learning:       Endings:     Modes of Intervention:       Discipline Responsible:       Signature:  Cindi Mcleod    
  Group Therapy Note     Date: 1/31/2024  Start Time: 1100  End Time:  1130  Number of Participants: 12     Type of Group: Psychoeducation     Wellness Binder Information  Module Name:  emotional wellness  Session Number:  5     Patient's Goal:  obstacles to emotional wellness     Notes:  pt was verbally prompted to attend group. Pt refused. Information about obstacles to wellness was provided.     Status After Intervention:       Participation Level:      Participation Quality:         Speech:          Thought Process/Content:         Affective Functioning:         Mood:         Level of consciousness:          Response to Learning:         Endings:      Modes of Intervention:         Discipline Responsible: Psychoeducational Specialist        Signature:  Ester Walsh                 
  Group Therapy Note     Date: 2/5/2024  Start Time: 1100  End Time:  1130  Number of Participants: 13     Type of Group: Psychoeducation     Wellness Binder Information  Module Name:  staying well  Session Number:  1     Patient's Goal:  daily maintenance and coping skills     Notes:  pt was verbally prompted to attend group. Pt refused. Information about coping skills was provided.     Status After Intervention:       Participation Level:      Participation Quality:         Speech:          Thought Process/Content:         Affective Functioning:         Mood:         Level of consciousness:          Response to Learning:         Endings:      Modes of Intervention:         Discipline Responsible: Psychoeducational Specialist        Signature:  Ester Walsh                 
  Group Therapy Note     Date: 2/5/2024  Start Time: 1530  End Time:  1600  Number of Participants: 6     Type of Group: Psychoeducation     Wellness Binder Information  Module Name:  emotional wellness  Session Number:  5     Patient's Goal:  obstacles to emotional wellness     Notes:  pt was verbally prompted to attend group. Pt refused. Information about obstacles to wellness was provided.     Status After Intervention:       Participation Level:      Participation Quality:         Speech:          Thought Process/Content:         Affective Functioning:         Mood:         Level of consciousness:          Response to Learning:         Endings:      Modes of Intervention:         Discipline Responsible: Psychoeducational Specialist        Signature:  Ester Walsh                 
  Problem: Anxiety  Goal: Will report anxiety at manageable levels  Description: INTERVENTIONS:  1. Administer medication as ordered  2. Teach and rehearse alternative coping skills  3. Provide emotional support with 1:1 interaction with staff  Recent Flowsheet Documentation  Taken 2/4/2024 0848 by Yoko Frazier RN  Will report anxiety at manageable levels:   Administer medication as ordered   Teach and rehearse alternative coping skills   Provide emotional support with 1:1 interaction with staff     Problem: Depression/Self Harm  Goal: Effect of psychiatric condition will be minimized and patient will be protected from self harm  Description: INTERVENTIONS:  1. Assess impact of patient's symptoms on level of functioning, self care needs and offer support as indicated  2. Assess patient/family knowledge of depression, impact on illness and need for teaching  3. Provide emotional support, presence and reassurance  4. Assess for possible suicidal thoughts or ideation. If patient expresses suicidal thoughts or statements do not leave alone, initiate Suicide Precautions, move to a room close to the nursing station and obtain sitter  5. Initiate consults as appropriate with Mental Health Professional, Spiritual Care, Psychosocial CNS, and consider a recommendation to the [facility] for a Psychiatric Consultation  Recent Flowsheet Documentation  Taken 2/4/2024 0855 by Yoko Frazier RN  Effect of psychiatric condition will be minimized and patient will be protected from self harm: Assess impact of patient’s symptoms on level of functioning, self care needs and offer support as indicated  Taken 2/4/2024 0255 by Adry Hanna RN  Effect of psychiatric condition will be minimized and patient will be protected from self harm:   Assess impact of patient’s symptoms on level of functioning, self care needs and offer support as indicated   Assess patient/family knowledge of depression, impact on illness and need for teaching   
  Problem: Chronic Conditions and Co-morbidities  Goal: Patient's chronic conditions and co-morbidity symptoms are monitored and maintained or improved  Outcome: Completed     Problem: Anxiety  Goal: Will report anxiety at manageable levels  Description: INTERVENTIONS:  1. Administer medication as ordered  2. Teach and rehearse alternative coping skills  3. Provide emotional support with 1:1 interaction with staff  Outcome: Completed  Flowsheets (Taken 2/1/2024 7100)  Will report anxiety at manageable levels:   Administer medication as ordered   Provide emotional support with 1:1 interaction with staff   Teach and rehearse alternative coping skills     Problem: Coping  Goal: Pt/Family able to verbalize concerns and demonstrate effective coping strategies  Description: INTERVENTIONS:  1. Assist patient/family to identify coping skills, available support systems and cultural and spiritual values  2. Provide emotional support, including active listening and acknowledgement of concerns of patient and caregivers  3. Reduce environmental stimuli, as able  4. Instruct patient/family in relaxation techniques, as appropriate  5. Assess for spiritual pain/suffering and initiate Spiritual Care, Psychosocial Clinical Specialist consults as needed  Outcome: Completed  Flowsheets (Taken 2/1/2024 5830)  Patient/family able to verbalize anxieties, fears, and concerns, and demonstrate effective coping:   Assist patient/family to identify coping skills, available support systems and cultural and spiritual values   Provide emotional support, including active listening and acknowledgement of concerns of patient and caregivers   Reduce environmental stimuli, as able   Instruct patient/family in relaxation techniques, as appropriate   Assess for spiritual pain/suffering and initiate Spiritual Care, Psychosocial Clinical Specialist consults as needed     Problem: Decision Making  Goal: Pt/Family able to effectively weigh alternatives and 
  Problem: Chronic Conditions and Co-morbidities  Goal: Patient's chronic conditions and co-morbidity symptoms are monitored and maintained or improved  Outcome: Progressing     Problem: Anxiety  Goal: Will report anxiety at manageable levels  Description: INTERVENTIONS:  1. Administer medication as ordered  2. Teach and rehearse alternative coping skills  3. Provide emotional support with 1:1 interaction with staff  Outcome: Progressing     Problem: Coping  Goal: Pt/Family able to verbalize concerns and demonstrate effective coping strategies  Description: INTERVENTIONS:  1. Assist patient/family to identify coping skills, available support systems and cultural and spiritual values  2. Provide emotional support, including active listening and acknowledgement of concerns of patient and caregivers  3. Reduce environmental stimuli, as able  4. Instruct patient/family in relaxation techniques, as appropriate  5. Assess for spiritual pain/suffering and initiate Spiritual Care, Psychosocial Clinical Specialist consults as needed  Outcome: Progressing     Problem: Decision Making  Goal: Pt/Family able to effectively weigh alternatives and participate in decision making related to treatment and care  Description: INTERVENTIONS:  1. Determine when there are differences between patient's view, family's view, and healthcare provider's view of condition  2. Facilitate patient and family articulation of goals for care  3. Help patient and family identify pros/cons of alternative solutions  4. Provide information as requested by patient/family  5. Respect patient/family right to receive or not to receive information  6. Serve as a liaison between patient and family and health care team  7. Initiate Consults from Ethics, Palliative Care or initiate Family Care Conference as is appropriate  Outcome: Progressing     Problem: Behavior  Goal: Pt/Family maintain appropriate behavior and adhere to behavioral management agreement, if 
  Problem: Psychosis  Goal: Will report no hallucinations or delusions  Description: INTERVENTIONS:  1. Administer medication as  ordered  2. Assist with reality testing to support increasing orientation  3. Assess if patient's hallucinations or delusions are encouraging self harm or harm to others and intervene as appropriate  2/3/2024 1006 by Roc Ponce RN  Outcome: Progressing  2/3/2024 0112 by Adry Hanna RN  Outcome: Progressing     Problem: Anxiety  Goal: Will report anxiety at manageable levels  Description: INTERVENTIONS:  1. Administer medication as ordered  2. Teach and rehearse alternative coping skills  3. Provide emotional support with 1:1 interaction with staff  2/3/2024 1006 by Roc Ponce RN  Outcome: Progressing  2/3/2024 0112 by Adry Hanna RN  Outcome: Progressing     
  Problem: Self Harm/Suicidality  Goal: Will have no self-injury during hospital stay  Description: INTERVENTIONS:  1.  Ensure constant observer at bedside with Q15M safety checks  2.  Maintain a safe environment  3.  Secure patient belongings  4.  Ensure family/visitors adhere to safety recommendations  5.  Ensure safety tray has been added to patient's diet order  6.  Every shift and PRN: Re-assess suicidal risk via Frequent Screener    Outcome: Completed     Problem: Psychosis  Goal: Will report no hallucinations or delusions  Description: INTERVENTIONS:  1. Administer medication as  ordered  2. Assist with reality testing to support increasing orientation  3. Assess if patient's hallucinations or delusions are encouraging self harm or harm to others and intervene as appropriate  Outcome: Completed     Problem: Anxiety  Goal: Will report anxiety at manageable levels  Description: INTERVENTIONS:  1. Administer medication as ordered  2. Teach and rehearse alternative coping skills  3. Provide emotional support with 1:1 interaction with staff  Outcome: Completed     Problem: Sleep Disturbance  Goal: Will exhibit normal sleeping pattern  Description: INTERVENTIONS:  1. Administer medication as ordered  2. Decrease environmental stimuli, including noise, as appropriate  3. Discourage social isolation and naps during the day  Outcome: Completed     Problem: Self Care Deficit  Goal: Return ADL status to a safe level of function  Description: INTERVENTIONS:  1. Administer medication as ordered  2. Assess ADL deficits and provide assistive devices as needed  3. Obtain PT/OT consults as needed  4. Assist and instruct patient to increase activity and self care as tolerated  Outcome: Completed     Problem: Spiritual Care  Goal: Pt/Family able to move forward in process of forgiving self, others, and/or higher power  Description: INTERVENTIONS:  1. Assist patient/family to overcome blocks to healing by use of spiritual 
  Problem: Self Harm/Suicidality  Goal: Will have no self-injury during hospital stay  Description: INTERVENTIONS:  1.  Ensure constant observer at bedside with Q15M safety checks  2.  Maintain a safe environment  3.  Secure patient belongings  4.  Ensure family/visitors adhere to safety recommendations  5.  Ensure safety tray has been added to patient's diet order  6.  Every shift and PRN: Re-assess suicidal risk via Frequent Screener    Outcome: Progressing     Problem: Psychosis  Goal: Will report no hallucinations or delusions  Description: INTERVENTIONS:  1. Administer medication as  ordered  2. Assist with reality testing to support increasing orientation  3. Assess if patient's hallucinations or delusions are encouraging self harm or harm to others and intervene as appropriate  Outcome: Progressing     Problem: Anxiety  Goal: Will report anxiety at manageable levels  Description: INTERVENTIONS:  1. Administer medication as ordered  2. Teach and rehearse alternative coping skills  3. Provide emotional support with 1:1 interaction with staff  Outcome: Progressing     Problem: Sleep Disturbance  Goal: Will exhibit normal sleeping pattern  Description: INTERVENTIONS:  1. Administer medication as ordered  2. Decrease environmental stimuli, including noise, as appropriate  3. Discourage social isolation and naps during the day  Outcome: Progressing     Problem: Self Care Deficit  Goal: Return ADL status to a safe level of function  Description: INTERVENTIONS:  1. Administer medication as ordered  2. Assess ADL deficits and provide assistive devices as needed  3. Obtain PT/OT consults as needed  4. Assist and instruct patient to increase activity and self care as tolerated  Outcome: Progressing     Problem: Spiritual Care  Goal: Pt/Family able to move forward in process of forgiving self, others, and/or higher power  Description: INTERVENTIONS:  1. Assist patient/family to overcome blocks to healing by use of 
  Problem: Self Harm/Suicidality  Goal: Will have no self-injury during hospital stay  Description: INTERVENTIONS:  1.  Ensure constant observer at bedside with Q15M safety checks  2.  Maintain a safe environment  3.  Secure patient belongings  4.  Ensure family/visitors adhere to safety recommendations  5.  Ensure safety tray has been added to patient's diet order  6.  Every shift and PRN: Re-assess suicidal risk via Frequent Screener    Reactivated     Problem: Psychosis  Goal: Will report no hallucinations or delusions  Description: INTERVENTIONS:  1. Administer medication as  ordered  2. Assist with reality testing to support increasing orientation  3. Assess if patient's hallucinations or delusions are encouraging self harm or harm to others and intervene as appropriate  Reactivated     Problem: Anxiety  Goal: Will report anxiety at manageable levels  Description: INTERVENTIONS:  1. Administer medication as ordered  2. Teach and rehearse alternative coping skills  3. Provide emotional support with 1:1 interaction with staff  Reactivated     Problem: Sleep Disturbance  Goal: Will exhibit normal sleeping pattern  Description: INTERVENTIONS:  1. Administer medication as ordered  2. Decrease environmental stimuli, including noise, as appropriate  3. Discourage social isolation and naps during the day  Reactivated     Problem: Self Care Deficit  Goal: Return ADL status to a safe level of function  Description: INTERVENTIONS:  1. Administer medication as ordered  2. Assess ADL deficits and provide assistive devices as needed  3. Obtain PT/OT consults as needed  4. Assist and instruct patient to increase activity and self care as tolerated  Reactivated     
PT/OT consults as needed  4. Assist and instruct patient to increase activity and self care as tolerated  2/3/2024 0112 by Adry Hanna RN  Outcome: Progressing  2/2/2024 1258 by Becca Wheeler RN  Reactivated     Problem: Spiritual Care  Goal: Pt/Family able to move forward in process of forgiving self, others, and/or higher power  Description: INTERVENTIONS:  1. Assist patient/family to overcome blocks to healing by use of spiritual practices (prayer, meditation, guided imagery, reiki, breath work, etc).  2. De-myth guilt and help patient/family identify possible irrational spiritual/cultural beliefs and values.  3. Explore possibilities of making amends & reconciliation with self, others, and/or a greater power.  4. Guide patient/family in identifying painful feelings of guilt.  5. Help patient/famiy explore and identify spiritual beliefs, cultural understandings or values that may help or hinder letting go of issue.  6. Help patient/family explore feelings of anger, bitterness, resentment.  7. Help patient/family identify and examine the situation in which these feelings are experienced.  8. Help patient/family identify destructive displacement of feelings onto other individuals.  9. Invite use of sacraments/rituals/ceremonies as appropriate (e.g. - confession, anointing, smudging).  10. Refer patient/family to formal counseling and/or to aura community for further support work.  2/3/2024 0112 by Adry Hanna RN  Outcome: Progressing  2/2/2024 1258 by Becca Wheeler, JOSÉ MIGUEL  Reactivated

## 2024-02-05 NOTE — CARE COORDINATION
For follow up IMM (Important Message to Medicare), staff has attempted to call the guardian to review document with guardian, unable to reach guardian multiple times, staff reported this to this UR.    Electronically signed, Angeline Mclean LPN, -Utilization Review , 02/05/24 1:26 PM

## 2024-02-05 NOTE — DISCHARGE INSTR - DIET

## 2024-02-06 VITALS
TEMPERATURE: 96.8 F | SYSTOLIC BLOOD PRESSURE: 119 MMHG | WEIGHT: 211 LBS | BODY MASS INDEX: 35.11 KG/M2 | OXYGEN SATURATION: 96 % | DIASTOLIC BLOOD PRESSURE: 69 MMHG | HEART RATE: 68 BPM | RESPIRATION RATE: 16 BRPM

## 2024-02-06 PROCEDURE — 6370000000 HC RX 637 (ALT 250 FOR IP): Performed by: NURSE PRACTITIONER

## 2024-02-06 RX ADMIN — RISPERIDONE 2 MG: 1 TABLET, FILM COATED ORAL at 08:24

## 2024-02-06 RX ADMIN — CETIRIZINE HYDROCHLORIDE 10 MG: 10 TABLET, FILM COATED ORAL at 08:24

## 2024-02-06 RX ADMIN — PANTOPRAZOLE SODIUM 40 MG: 40 TABLET, DELAYED RELEASE ORAL at 06:24

## 2024-02-06 RX ADMIN — AMLODIPINE BESYLATE 10 MG: 10 TABLET ORAL at 08:24

## 2024-02-06 NOTE — DISCHARGE SUMMARY
back on his medications so he will feel better. He does not want to talk any further and asked if he can go back to sleep.     Hospital Course:   Patient was admitted to the [facility] behavioral health floor and was acclimated to the floor. Labs were reviewed and physical exam was completed by Dr. Johnston and associates. Home medications were reconciled. His home medications were restarted including Seroquel, Depakote, Trazodone and Risperdal. BOBY was obtained and reviewed. Medication changes were made and patient tolerated well with no side effects. Patient attended and participated in few groups.  Patient was calm and cooperative with staff and peers. He was mostly isolated to his room. He was behaviorally stable during the entire psychiatric hospitalization.  Patient was compliant with his medications. Patient was sleeping through the night. This patient is not suicidal, homicidal or psychotic at discharge. He does not present a danger to self or others. On the day of discharge and transfer of care, patient indicated readiness for discharge. On 2/6/2024 he had received the maximum benefit from the inpatient psychiatric hospitalization and was discharged home. He was not acutely manic nor agitated with no reported symptoms of psychosis. He indicated no thoughts of self-harm or harm to others. Given resolution of presenting symptoms and patient readiness for discharge and that patient agreed to follow-up with outpatient services with [facility] and the patient was discharged with a 30 day supply of medication. He denied access to firearms or weapons. He was advised to abstain from all drugs and alcohol and to remain adherent to medication as prescribed.        Number of antipsychotic medication prescribed at discharge: Seroquel and Risperdal  IF MORE THAN ONE IS USED:    History of greater than 3 failed multiple monotherapy trials        Referral to addiction treatment: n/a    Prescription for

## 2024-02-07 NOTE — PROGRESS NOTES
Group Therapy Note    Date: 2/1/2024  Start Time: 0800  End Time:  0830  Number of Participants: 15    Type of Group: Community Meeting    Wellness Binder Information  Module Name:  Self-inventory  Session Number:  1    Patient's Goal:  Go home and get better      Status After Intervention:  Unchanged    Participation Level: Active Listener    Participation Quality: Appropriate      Speech:  normal      Thought Process/Content: blocking      Affective Functioning: Flat      Mood:  calm      Level of consciousness:  Alert      Response to Learning: Able to verbalize current knowledge/experience      Endings: None Reported    Modes of Intervention: Support and Exploration      Discipline Responsible: Registered Nurse      Signature:  Catalino Jasso RN    
                                                                Group Note    Date: 01/31/24  Start Time: 7:15 PM   End Time:7:45 PM     Number of Participants: 9    Type of Group: Wrap-Up     Patient's Goal:  \"is to get better so I can go home\"    Notes:  see paper chart    Status After Intervention:  Unchanged    Participation Level: Minimal    Participation Quality: Appropriate    Speech:  normal    Thought Process/Content: Hallucinating    Mood: anxious and depressed    Level of consciousness:  Alert    Response to Learning: Progressing to goal    Modes of Intervention: Education and Support    Discipline Responsible: Registered Nurse     Signature:  Emma Peters RN  
                                                                Group Note    Date: 02/02/24  Start Time: 7:30 PM   End Time:8:00 PM     Number of Participants: 8    Type of Group: Wrap-Up     Patient's Goal:      Notes:  see wrap up sheet    Status After Intervention:  Unchanged    Participation Level: Minimal    Participation Quality: Appropriate    Speech:  normal    Thought Process/Content: Logical    Mood: anxious    Level of consciousness:  Alert    Response to Learning: Able to verbalize current knowledge/experience    Modes of Intervention: Education and Support    Discipline Responsible: Registered Nurse     Signature:  Cinthia Egan RN   
                                                                Group Note    Date: 02/02/24  Start Time: 8:00 AM   End Time:8:30 AM     Number of Participants: 10    Type of Group: Community/Goal     Patient's Goal:  go home    Notes:      Status After Intervention:      Participation Level: Active Listener    Participation Quality: Appropriate    Speech:  normal    Thought Process/Content: Logical    Mood:  calm    Level of consciousness:  Alert    Response to Learning: Able to verbalize current knowledge/experience    Modes of Intervention: Education and Support    Discipline Responsible: Behavioral Health Technician     Signature:  Mely Huang  
                                                                Group Note    Date: 02/03/24  Start Time: 8:00 AM   End Time:8:30 AM     Number of Participants: 9    Type of Group: Community/Goal     Patient's Goal:  \"Going to group\"    Notes:      Status After Intervention:      Participation Level: Active Listener    Participation Quality: Appropriate    Speech:  normal    Thought Process/Content: Logical    Mood:  Calm    Level of consciousness:  Alert    Response to Learning: Able to verbalize current knowledge/experience    Modes of Intervention: Education and Support    Discipline Responsible: Behavioral Health Technician     Signature:  Jean Pierre Ruano  
                                                                Group Note    Date: 02/04/2024  Start Time: 7:45 PM   End Time:8:15 PM     Number of Participants: 14    Type of Group: Wrap-Up     Patient's Goal:  goals in process    Notes:  pt isolative to self     Status After Intervention:  Unchanged    Participation Level: Minimal    Participation Quality: Resistant    Speech:  hesitant and soft spoken    Thought Process/Content: Logical     Mood:  paranoid, anxious, tremors    Level of consciousness:  Preoccupied    Response to Learning: Able to retain information    Modes of Intervention: Education and Support    Discipline Responsible: Registered Nurse     Signature:  Adry Hanna RN  
                                                                Group Note    Date: 02/04/24  Start Time: 8:00 AM   End Time:8:30 AM     Number of Participants: 11    Type of Group: Community/Goal     Patient's Goal:  \"More sleep\"    Notes:      Status After Intervention:      Participation Level: Active Listener    Participation Quality: Appropriate    Speech:  normal    Thought Process/Content: Logical    Mood:  Calm    Level of consciousness:  Alert    Response to Learning: Able to verbalize current knowledge/experience    Modes of Intervention: Education and Support    Discipline Responsible: Behavioral Health Technician     Signature:  Jean Pierre Ruano  
                                                                Group Note    Date: 02/05/24  Start Time: 7:30 PM   End Time:8:00 PM     Number of Participants: 7    Type of Group: Wrap-Up     Patient's Goal:  reflect on today    Notes:  see wrap up sheet    Status After Intervention:  Unchanged    Participation Level: Minimal    Participation Quality: Appropriate    Speech:  soft tone    Thought Process/Content: Linear    Mood:  cooperative    Level of consciousness:  Alert and Oriented x4    Response to Learning: Able to retain information    Modes of Intervention: Education and Support    Discipline Responsible: Registered Nurse     Signature:  Brigitte Camargo RN  
                                                                Group Note    Date: 02/06/24  Start Time: 8:00 AM   End Time:8:30 AM     Number of Participants: 13    Type of Group: Community/Goal     Patient's Goal:      Notes:  Did not participate    Modes of Intervention: Education and Support    Discipline Responsible: Behavioral Health Technician     Signature:  Jean Pierre Ruano  
                                                  Admission Note      Reason for admission/Target Symptom: Per KATE admission assessment -  \"\"I'm not taking my meds. Because it makes me feel shakey. Pt went to therapy yesterday, she called his psychiatrist because he's not taking his medications. His psychiatrist couldn't get him an appointment until Feb 20th. Pt is diagnosed Paranoid Schizophrenia, and Mental Retardation. Pt's caregiver reports he has not been taking his medications for several weeks. They have baggies of medication that he hasn't taken.She reports he has called them and said there were people in the apartment talking about him.  Pt denies SI/HI/AVH. He does not appear to be responding to internal stimuli.    Pt was still experiencing nightmares so his Prazosin has been increased to 2 mg last week. Pt was in a group home and was transferred to his own apartment with caretakers available for him for 40 hrs a week. Since moving on his own, he hasn't been taking his meds. They are suppose to be having a treatment team meeting to see if it would be better for him to go back to a group home setting.\"     Diagnoses: Depression NOS   UDS: Negative   BAL: <10     SW will meet with treatment team to discuss patient's treatment including care planning, discharge planning, and follow-up needs. Patient has been admitted to [facility].     Treatment team will identify the patient's discharge needs. Appointments will be made for medication management and outpatient therapy/counseling. Pt confirmed the need for ongoing treatment post inpatient stay. Pt was also provided a handout of contact information for drug and alcohol treatment centers and other community support service such as [place], AA, and [facility].  
                                                 Treatment Team Note:    Target Symptoms/Reason for admission: Per nursing admission assessment -      Diagnoses per psych provider: Paranoia ([facility]) [F22]  Paranoid behavior ([facility]) [F22]    Therapist met with treatment team to discuss patients treatment and discharge plans.    Patient's aftercare plan is: SW will meet with patient to gather information    Aftercare appointments made: No - SW will make discharge appointments    Pt lives with: SW will meet with patient to gather information    Collateral obtained from: SW will meet with patient to gather information  Collateral obtained on:new admission    Attending groups: New admission - SW will monitor group attendance    Behavior: calm    Has patient been completing ADL's:  New admission - unknown at this time - SW will monitor    SI:  patient denies SI  Plan: no   If yes describe: N/A - patient denies plan  HI:  patient denies HI  If present describe: N/A  Delusions: patient denies delusions  If present describe: N/A  Hallucinations: patient denies hallucinations  If present describe: N/A    Patient rates their -- Depression: 1-10:  0  Anxiety:1-10:  0    Sleeping:New admission - unknown at this time.    Taking medication: New admission - unknown at this time.    Misc:                                                    
                                                 Treatment Team Note:    Target Symptoms/Reason for admission: Per nursing admission assessment - Reason for Admission: Troy Landers is a 38 yoa is a  male who presented to the ER for paranoia.  Pt was brought in by his care giver, Veronica and [facility] employee, Cinthia.  Pt thinks his neighbors are messing with him.  He reports they are messing with the fans.States, \"I'm not taking my meds. Because it makes me feel shakey. Pt went to therapy yesterday, therapist called his psychiatrist because he's not taking his medications. . Pt is diagnosed Paranoid Schizophrenia, and Mental Retardation. Pt's caregiver reports he has not been taking his medications for several weeks. They have baggies of medication that he hasn't taken. She reports he has called them and said there were people in the apartment talking about him.    Diagnoses per psych provider: Paranoia ([facility]) [F22]  Paranoid behavior ([facility]) [F22]    Therapist met with treatment team to discuss patients treatment and discharge plans.    Patient[facility]    Aftercare appointments made: Yes    Pt lives with: patient lives alone with a sitter    Collateral obtained from:  Pt's Gia  Collateral obtained on:02/03/24    Attending groups:  minimal participation    Behavior: cooperative, fair eye contact, anxious, suspicious, isolative to self    Has patient been completing ADL's:  Yes    SI:  patient denies SI  Plan: no   If yes describe: N/A - patient denies plan  HI:  patient denies HI  If present describe: N/A  Delusions: patient denies delusions  If present describe: N/A  Hallucinations: patient denies hallucinations  If present describe: N/A    Patient rates their -- Depression: 1-10:  4  Anxiety:1-10:  3    Sleeping:Yes    Taking medication: Yes    Misc: Pt will be discharged today.                                          
        [facility]      Psychiatric Progress Note    Name:  Troy Landers  Date:  2/6/2024  Age:  38 y.o.  Sex:  male  Ethnicity:   Primary Care Physician:  Tho Catherine MD   Patient Care Team:  Patient Care Team:  Tho Catherine MD as PCP - General (Family Medicine)  Tho Catherine MD as PCP - Empaneled Provider  Chief Complaint: \" I am doing good.\"        Historian:patient  Complaint Type: anxiety, depression, sleep disturbance, and psychosis  Course of Symptoms: improved  Precipitating Factors: history of mental illness, treatment noncompliance           Subjective  Nursing notes were reviewed and patient had no behavioral issues during the night. As needed medications administered during the night include Trazodone. Today he denies suicidal ideation, homicidal ideation and psychosis. He has been out of his room walking around the milieu. Today he was suppose to have been discharged however his mother, who is legal guardian refused to pick him up. Initially she reported that she would pick him up and around 6:30 pm she called and reported she would not be picking him up. His discharge had to be cancelled.  Patient reports sleep as, \"I am sleeping all night.\"  Patient has been calm and cooperative with staff and peers. Patient has been compliant with medications. Patient has been attending groups. Patient reports appetite as, \"I am eating better now.\" Patient reports no side effects from medications.          Previous Psychiatric/Substance Use History      Medical History:  Past Medical History:   Diagnosis Date    Allergic rhinitis     Chronic post-traumatic stress disorder (PTSD) 1/31/2024    Episodic mood disorder (HCC)     Hyperlipidemia     Hypertension     Mild intellectual disabilities     Morbid obesity with BMI of 40.0-44.9, adult (HCC) 9/18/2019    Schizophrenia ([facility])         DISLA History:   Social History     Substance and Sexual Activity   Alcohol Use No    Alcohol/week: 
 called to speak with patient about his aftercare support and patient was sleeping and didn't take the call Gia Toro [phone]  
Attempted to call patient's guardian, Belinda Landers.  Unable to reach her.  Left message to call regarding discharge and Medicare form to sign.  
Attempted to call the patient's guardian for verbal consent. Left a message on voicemail to call the unit. Awaiting return call.  
Behavioral Health   Discharge Note  Bridge Appointment completed: Reviewed Discharge Instructions with patient.    Patient verbalizes understanding and agreement with the discharge plan using the teachback method.     Referral for Outpatient Tobacco Cessation Counseling, upon discharge (nicolette X if applicable and completed):    ( )  Hospital staff assisted patient to call Quit Line or faxed referral                                   during hospitalization                  ( )  Recognizing danger situations (included triggers and roadblocks), if not completed on admission                    ( )  Coping skills (new ways to manage stress, exercise, relaxation techniques, changing routine, distraction), if not completed on admission                                                           ( )  Basic information about quitting (benefits of quitting, techniques in how to quit, available resources, if not completed on admission  ( ) [facility]   (X) Patient refused referral  (X) Patient refused counseling  (X) Patient refused smoking cessation medication upon discharge    Vaccinations (nicolette X if applicable and completed):  ( ) Patient states already received influenza vaccine elsewhere  ( ) Patient received influenza vaccine during this hospitalization  (X) Patient refused influenza vaccine at this time      Pt discharged with followings belongings:      Valuables AND BELONGINGS sent home with PATIENT.   Valuables retrieved from [facility] and returned to patient.    Patient left department with WILLEM MORELAND RN via TRANSPORTATION PROVIDED BY MOTHER (LEGAL GUARDIAN), discharged to HOME/SELF-CARE.   Patient education on aftercare instructions: YES, EDUCATED RACHEL HAWKINS (LEGAL GUARDIAN) ON DISCHARGE INSTRUCTIONS   Patient verbalize understanding of AVS:  RACHEL HAWKINS (LEGAL GUARDIAN) VERBALIZED UNDERSTANDING.    Suicidal Ideations? No Risk of Suicide: No Risk   HI? Negative for 
CLINICAL PHARMACY NOTE: MEDS TO BEDS    Total # of Prescriptions Filled: 1   The following medications were delivered to the patient:  Current Discharge Medication List        START taking these medications    Details   traZODone (DESYREL) 100 MG tablet Take 1 tablet by mouth nightly as needed for Sleep  Qty: 30 tablet, Refills: 0               Additional Documentation:    Delivered RX to nurse station  
[facility] Adult Unit Daily Assessment  Nursing Progress Note    Room: [facility]-   Name: Troy Landers   Age: 38 y.o.   Gender: male   Dx: Schizophrenia, paranoid (HCC)  Precautions: suicide risk  Inpatient Status: voluntary     SLEEP:  Sleep Quality  reports good sleep last night  Sleep Medications: Yes, Melatonin, Seroquel   PRN Sleep Meds: Yes, Trazodone     MEDICAL:  Other PRN Meds: No   Med Compliant: Yes  Accu-Chek: No  Oxygen/CPAP/BiPAP: No  CIWA/CINA: No   PAIN Assessment: none  Side Effects from medication: No    Metabolic Screening:  Lab Results   Component Value Date    LABA1C 5.6 11/08/2023     Lab Results   Component Value Date    CHOL 218 (H) 11/08/2023    CHOL 175 01/18/2023    CHOL 169 09/22/2022    CHOL 205 (H) 04/01/2021    CHOL 203 (H) 09/21/2020    CHOL 166 03/19/2019    CHOL 167 09/28/2018    CHOL 163 08/31/2018    CHOL 167 01/18/2017    CHOL 147 (L) 08/29/2016     Lab Results   Component Value Date    TRIG 218 (H) 11/08/2023    TRIG 101 01/18/2023    TRIG 112 09/22/2022    TRIG 191 (H) 04/01/2021    TRIG 214 (H) 09/21/2020    TRIG 154 (H) 03/19/2019    TRIG 190 (H) 09/28/2018    TRIG 103 08/31/2018    TRIG 92 (L) 01/18/2017    TRIG 91 (L) 08/29/2016     Lab Results   Component Value Date    HDL 66 11/08/2023    HDL 55 01/18/2023    HDL 53 (L) 09/22/2022    HDL 49 (L) 04/01/2021    HDL 50 (L) 09/21/2020    HDL 47 (L) 03/19/2019    HDL 54 (L) 09/28/2018    HDL 55 08/31/2018    HDL 60 01/18/2017    HDL 57 08/29/2016     No components found for: \"LDLCAL\"  No components found for: \"LABVLDL\"  Body mass index is 35.11 kg/m².  BP Readings from Last 2 Encounters:   02/04/24 (!) 144/87   11/08/23 (!) 130/58       Medical Bed:   Is patient in a medical bed? no   If medical bed is in use, has nursing secured room while patient is awake and out of the room? NA  Has safety checks by nursing been completed on the bed/room this shift? NA    Protective Factors:  Patient identifies protective factors with nursing 
[facility] Adult Unit Daily Assessment  Nursing Progress Note    Room: [facility]   Name: Troy Landers   Age: 38 y.o.   Gender: male   Dx: Schizophrenia, paranoid (HCC)  Precautions: suicide risk  Inpatient Status: voluntary     SLEEP:  Sleep Quality Very poor  Sleep Medications: Yes   PRN Sleep Meds: Yes     MEDICAL:  Other PRN Meds: Yes   Med Compliant: Yes  Accu-Chek: No  Oxygen/CPAP/BiPAP: No  CIWA/CINA: No   PAIN Assessment: none  Side Effects from medication: No    Metabolic Screening:  Lab Results   Component Value Date    LABA1C 5.6 11/08/2023     Lab Results   Component Value Date    CHOL 218 (H) 11/08/2023    CHOL 175 01/18/2023    CHOL 169 09/22/2022    CHOL 205 (H) 04/01/2021    CHOL 203 (H) 09/21/2020    CHOL 166 03/19/2019    CHOL 167 09/28/2018    CHOL 163 08/31/2018    CHOL 167 01/18/2017    CHOL 147 (L) 08/29/2016     Lab Results   Component Value Date    TRIG 218 (H) 11/08/2023    TRIG 101 01/18/2023    TRIG 112 09/22/2022    TRIG 191 (H) 04/01/2021    TRIG 214 (H) 09/21/2020    TRIG 154 (H) 03/19/2019    TRIG 190 (H) 09/28/2018    TRIG 103 08/31/2018    TRIG 92 (L) 01/18/2017    TRIG 91 (L) 08/29/2016     Lab Results   Component Value Date    HDL 66 11/08/2023    HDL 55 01/18/2023    HDL 53 (L) 09/22/2022    HDL 49 (L) 04/01/2021    HDL 50 (L) 09/21/2020    HDL 47 (L) 03/19/2019    HDL 54 (L) 09/28/2018    HDL 55 08/31/2018    HDL 60 01/18/2017    HDL 57 08/29/2016     No components found for: \"LDLCAL\"  No components found for: \"LABVLDL\"  Body mass index is 35.11 kg/m².  BP Readings from Last 2 Encounters:   02/04/24 137/86   11/08/23 (!) 130/58       Medical Bed:   Is patient in a medical bed? no   If medical bed is in use, has nursing secured room while patient is awake and out of the room? NA  Has safety checks by nursing been completed on the bed/room this shift? NA    Protective Factors:  Patient identifies protective factors with nursing staff as follows:   Identifies reasons for living: 
Department of Psychiatry  Attending Progress Note     Chief complaint:  \"I'm ok\"     SUBJECTIVE:   Chart reviewed, discussed with the team. No major issues overnight. Patient is med-compliant. No SEs. Performs ADLs.     Patient seen in the dining area. Soft voice.  Noted paranoia. Denies SI/HI/AVH. Rates depression 4/10, anxiety 3/10. Slept 6h. Denies physical complaints.     OBJECTIVE    Physical  Wt Readings from Last 3 Encounters:   01/30/24 95.7 kg (211 lb)   11/08/23 93.4 kg (206 lb)   09/13/23 88 kg (194 lb)     Temp Readings from Last 3 Encounters:   02/03/24 97.5 °F (36.4 °C) (Tympanic)   11/08/23 98.4 °F (36.9 °C) (Temporal)   09/13/23 97.9 °F (36.6 °C) (Temporal)     BP Readings from Last 3 Encounters:   02/03/24 (!) 137/95   11/08/23 (!) 130/58   09/13/23 126/68     Pulse Readings from Last 3 Encounters:   02/03/24 74   11/08/23 (!) 109   09/13/23 93        Review of Systems: 14-point review of systems negative except as described above    Mental Status Examination:   Appearance:  Stated age. In scrubs. Gait stable.  No abnormal movements or tremor.  Behavior: Calm, slow  Speech: Very soft, slow  Mood: \"ok \"   Affect: constricted  Thought Process: Appears linear.  Thought Content:  Denies SI/HI. Reports paranoia.   Perceptions: Denies auditory or visual hallucinations at present time. Not responding to internal stimuli.   Concentration: Intact.   Orientation: to person, place, date, and situation.   Language: Intact.   Fund of information: Intact.   Memory: Recent and remote appear intact.   Neurovegitative: Fair appetite and sleep.   Insight: Impaired.   Judgment: Impaired.    Data  Lab Results   Component Value Date    WBC 7.2 01/30/2024    HGB 16.6 01/30/2024    HCT 51.0 01/30/2024    MCV 76.9 (L) 01/30/2024     01/30/2024      Lab Results   Component Value Date     (L) 01/30/2024    K 3.8 01/30/2024    CL 98 01/30/2024    CO2 23 01/30/2024    BUN 11 01/30/2024    CREATININE 0.9 01/30/2024 
Discharge Note     Patient is discharging on this date. Patient denies SI, HI, and AVH at this time. Patient reports improvement in behavior and is leaving unit in overall good condition. SW and patient discussed patient's follow up appointments and importance of attending appointments as scheduled, patient voiced understanding and agreement. Patient and SW also discussed patient's safety plan and patient was able to verbally identify: warning signs, coping strategies, places and people that help make the patient feel better/distract negative thoughts, friends/family/agencies/professionals the patient can reach out to in a crisis, and something that is important to the patient/worth living for. Patient was provided the national suicide prevention hotline number (1-743.719.8088) as well as local community behavioral health ([facility]) crisis number for emergencies (1-398.560.5335).     Discharge Disposition: home -lives alone      Pt to follow up with:  [facility]  on February 7 , 2024 at 10:00 AM for the intake appointment. Patient will follow up with  YARIEL Fox and AVELINO Casas    On February 13 , 2024   at 11:30 AM for the medication management appointment.     Referral to outpatient tobacco cessation counseling treatment:  Patient refused referral to outpatient tobacco cessation counseling    SW offered to assist patient with transportation, patient declined transportation assistance patient will be picked by Cece  
Discharge Note     Patient is discharging on this date. Patient denies SI, HI, and AVH at this time. Patient reports improvement in behavior and is leaving unit in overall good condition. SW and patient discussed patient's follow up appointments and importance of attending appointments as scheduled, patient voiced understanding and agreement. Patient and SW also discussed patient's safety plan and patient was able to verbally identify: warning signs, coping strategies, places and people that help make the patient feel better/distract negative thoughts, friends/family/agencies/professionals the patient can reach out to in a crisis, and something that is important to the patient/worth living for. Patient was provided the national suicide prevention hotline number (1-771.677.3350) as well as local community behavioral health ([facility]) crisis number for emergencies (1-477.451.5907).     Discharge Disposition: home -lives alone      Pt to follow up with:  [facility]  on February 7 , 2024 at 10:00 AM for the intake appointment. Patient will follow up with  [facility]    On February 13 , 2024 Xavier ABDALLA and AVELINO Nguyen  at 11:30 AM for the medication management appointment.     Referral to outpatient tobacco cessation counseling treatment:  Patient refused referral to outpatient tobacco cessation counseling    SW offered to assist patient with transportation, patient declined transportation assistance  
[facility] Adult Unit Daily Assessment  Nursing Progress Note    Room: [facility]   Name: Troy Landers   Age: 38 y.o.   Gender: male   Dx: Schizophrenia, paranoid (HCC)  Precautions: close watch  Inpatient Status: voluntary     SLEEP:  Sleep Quality Fair  Sleep Medications: Yes   PRN Sleep Meds: Yes     MEDICAL:  Other PRN Meds: Yes   Med Compliant: Yes  Accu-Chek: No  Oxygen/CPAP/BiPAP: No  CIWA/CINA: No   PAIN Assessment: none  Side Effects from medication: No    Metabolic Screening:  Lab Results   Component Value Date    LABA1C 5.6 11/08/2023     Lab Results   Component Value Date    CHOL 218 (H) 11/08/2023    CHOL 175 01/18/2023    CHOL 169 09/22/2022    CHOL 205 (H) 04/01/2021    CHOL 203 (H) 09/21/2020    CHOL 166 03/19/2019    CHOL 167 09/28/2018    CHOL 163 08/31/2018    CHOL 167 01/18/2017    CHOL 147 (L) 08/29/2016     Lab Results   Component Value Date    TRIG 218 (H) 11/08/2023    TRIG 101 01/18/2023    TRIG 112 09/22/2022    TRIG 191 (H) 04/01/2021    TRIG 214 (H) 09/21/2020    TRIG 154 (H) 03/19/2019    TRIG 190 (H) 09/28/2018    TRIG 103 08/31/2018    TRIG 92 (L) 01/18/2017    TRIG 91 (L) 08/29/2016     Lab Results   Component Value Date    HDL 66 11/08/2023    HDL 55 01/18/2023    HDL 53 (L) 09/22/2022    HDL 49 (L) 04/01/2021    HDL 50 (L) 09/21/2020    HDL 47 (L) 03/19/2019    HDL 54 (L) 09/28/2018    HDL 55 08/31/2018    HDL 60 01/18/2017    HDL 57 08/29/2016     No components found for: \"LDLCAL\"  No components found for: \"LABVLDL\"  Body mass index is 35.11 kg/m².  BP Readings from Last 2 Encounters:   02/03/24 (!) 137/95   11/08/23 (!) 130/58       Medical Bed:   Is patient in a medical bed? NA   If medical bed is in use, has nursing secured room while patient is awake and out of the room? NA  Has safety checks by nursing been completed on the bed/room this shift? NA    Protective Factors:  Patient identifies protective factors with nursing staff as follows:   Identifies reasons for living: 
GRETA placed a call to patients mom and legal guardian Yassine Landers [phone] and left a message. GRETA placed a call to patients  Gia and left a message for her Gia [phone]   
GRETA received a call from patient  and she stated that his doctor stated that he should not be able to live alone Brigitte is his  [phone].  Patient lives alone and patient has some one to help him with make sure that he can take his medications  
[facility] Adult Unit Daily Assessment  Nursing Progress Note    Room: [facility]   Name: Troy Landers   Age: 38 y.o.   Gender: male   Dx: Schizophrenia, paranoid (HCC)  Precautions: suicide risk  Inpatient Status: voluntary     SLEEP:  Sleep Quality Fair  Sleep Medications: Yes   PRN Sleep Meds: Yes     MEDICAL:  Other PRN Meds: Yes   Med Compliant: Yes  Accu-Chek: No  Oxygen/CPAP/BiPAP: No  CIWA/CINA: No   PAIN Assessment: none  Side Effects from medication: No    Metabolic Screening:  Lab Results   Component Value Date    LABA1C 5.6 11/08/2023     Lab Results   Component Value Date    CHOL 218 (H) 11/08/2023    CHOL 175 01/18/2023    CHOL 169 09/22/2022    CHOL 205 (H) 04/01/2021    CHOL 203 (H) 09/21/2020    CHOL 166 03/19/2019    CHOL 167 09/28/2018    CHOL 163 08/31/2018    CHOL 167 01/18/2017    CHOL 147 (L) 08/29/2016     Lab Results   Component Value Date    TRIG 218 (H) 11/08/2023    TRIG 101 01/18/2023    TRIG 112 09/22/2022    TRIG 191 (H) 04/01/2021    TRIG 214 (H) 09/21/2020    TRIG 154 (H) 03/19/2019    TRIG 190 (H) 09/28/2018    TRIG 103 08/31/2018    TRIG 92 (L) 01/18/2017    TRIG 91 (L) 08/29/2016     Lab Results   Component Value Date    HDL 66 11/08/2023    HDL 55 01/18/2023    HDL 53 (L) 09/22/2022    HDL 49 (L) 04/01/2021    HDL 50 (L) 09/21/2020    HDL 47 (L) 03/19/2019    HDL 54 (L) 09/28/2018    HDL 55 08/31/2018    HDL 60 01/18/2017    HDL 57 08/29/2016     No components found for: \"LDLCAL\"  No components found for: \"LABVLDL\"  Body mass index is 35.11 kg/m².  BP Readings from Last 2 Encounters:   02/01/24 (!) 146/94   11/08/23 (!) 130/58       Medical Bed:   Is patient in a medical bed? no   If medical bed is in use, has nursing secured room while patient is awake and out of the room? NA  Has safety checks by nursing been completed on the bed/room this shift? NA    Protective Factors:  Patient identifies protective factors with nursing staff as follows:   Identifies reasons for living: 
JAE placed a call to patients mom to speak with her about his discharge plans and signing any paperwork since she is the guardian. JAE left a message for his mom Jae placed a call to Freddy to see if there was another number to reach [facility] and they didn't have any other number  
Jae placed a call to patients  Gia [phone] who stated that she didn't have a plan to  patient didn't have a place to go after he leaves the hospital and they had a meeting last week and decided that he didn't need to be living alone. Patient care is being managed by Stephany in [facility] in [place], [place]  Address: [facility] [id number], [place] [address] Phone: [phone]. She stated that she feel that patient should not be living alone  
Jae placed a call to patients  to inform her of his discharge and she stated that she didn't have a person that could sit with him 24 hours a day and she requested a day to have a meeting with the hospital.  Jae told her that would not be possible due to the patient being here since 1/30 and that he will be discharged today.  [phone]  .  JAE was asked to contact [facility] to make them aware and Jae placed a call to nurse Becca Vega regarding patients care [phone]   
Jae spoke with a nurse Becca Vega regarding patients care [phone] and what medications he taking.  Becca call from Stephany in [place].  Ro[facility] in [place], [place]  [address] [facility] [address], [place] [address] Phone: [phone]. She stated that she feel that patient should not be living alone and should be in a group setting but his case mangager pushed for him to live alone and she found out that patient has not been taking his home medications  
Jae spoke with patients mom Belinda Landers [phone] and she stated that she called the hospital and told a hospital a staff member to call the Crisis center because she can't pick him up due to work.  Mom stated that she is working and she can't be home to take care of the patients. JAE stated that she was told the Patient has a person in the home with him for 12 hours a day and mom is his legal guardian and responsible to pick him up from the hospital and she asked why can't we sent him to a crisis center.  JAE stated to mom the difference between that  unit and the Crisis and stated that he had his own apartment.   
Left message with patients guardian to obtain verbal consent for medicare.  
[facility] Adult Unit Daily Assessment  Nursing Progress Note    Room: [facility]   Name: Troy Landers   Age: 38 y.o.   Gender: male   Dx: Schizophrenia, paranoid (HCC)  Precautions: close watch and suicide risk  Inpatient Status: voluntary     SLEEP:  Sleep Quality Good  Sleep Medications: Yes   PRN Sleep Meds: No     MEDICAL:  Other PRN Meds: No   Med Compliant: Yes  Accu-Chek: No  Oxygen/CPAP/BiPAP: No  CIWA/CINA: No   PAIN Assessment: none  Side Effects from medication: No    Metabolic Screening:  Lab Results   Component Value Date    LABA1C 5.6 11/08/2023     Lab Results   Component Value Date    CHOL 218 (H) 11/08/2023    CHOL 175 01/18/2023    CHOL 169 09/22/2022    CHOL 205 (H) 04/01/2021    CHOL 203 (H) 09/21/2020    CHOL 166 03/19/2019    CHOL 167 09/28/2018    CHOL 163 08/31/2018    CHOL 167 01/18/2017    CHOL 147 (L) 08/29/2016     Lab Results   Component Value Date    TRIG 218 (H) 11/08/2023    TRIG 101 01/18/2023    TRIG 112 09/22/2022    TRIG 191 (H) 04/01/2021    TRIG 214 (H) 09/21/2020    TRIG 154 (H) 03/19/2019    TRIG 190 (H) 09/28/2018    TRIG 103 08/31/2018    TRIG 92 (L) 01/18/2017    TRIG 91 (L) 08/29/2016     Lab Results   Component Value Date    HDL 66 11/08/2023    HDL 55 01/18/2023    HDL 53 (L) 09/22/2022    HDL 49 (L) 04/01/2021    HDL 50 (L) 09/21/2020    HDL 47 (L) 03/19/2019    HDL 54 (L) 09/28/2018    HDL 55 08/31/2018    HDL 60 01/18/2017    HDL 57 08/29/2016     No components found for: \"LDLCAL\"  No components found for: \"LABVLDL\"  Body mass index is 35.11 kg/m².  BP Readings from Last 2 Encounters:   02/01/24 120/72   11/08/23 (!) 130/58       Medical Bed:   Is patient in a medical bed? no   If medical bed is in use, has nursing secured room while patient is awake and out of the room? NA  Has safety checks by nursing been completed on the bed/room this shift? yes    Protective Factors:  Patient identifies protective factors with nursing staff as follows:   Identifies reasons for 
Patients mom Belinda called and stated that she would be here today at noon to pick him up and stated that she was driving from [facility]  
Progress Note  Troy Landers  2/2/2024 11:15 PM  Subjective:   Admit Date:   1/30/2024      CC/ADMIT DX:       Interval History:   Reviewed overnight events and nursing notes. He denies any new medical issues.     I have reviewed all labs/diagnostics from the last 24hrs.       ROS:   I have done a 10 point ROS and all are negative, except what is mentioned in the HPI.    ADULT DIET; Regular; Safety Tray; Safety Tray (Disposables)    Medications:      divalproex  1,500 mg Oral Nightly    cetirizine  10 mg Oral Daily    pantoprazole  40 mg Oral QAM AC    prazosin  2 mg Oral Nightly    risperiDONE  2 mg Oral BID    QUEtiapine  300 mg Oral Nightly    amLODIPine  10 mg Oral Daily    nicotine  1 patch TransDERmal Daily    melatonin  5 mg Oral Nightly           Objective:   Vitals: /89   Pulse 88   Temp 97.2 °F (36.2 °C)   Resp 18   Wt 95.7 kg (211 lb)   SpO2 99%   BMI 35.11 kg/m²  No intake or output data in the 24 hours ending 02/02/24 1645  General appearance: alert and cooperative with exam  Extremities: extremities normal, atraumatic, no cyanosis or edema  Neurologic:  No obvious focal neurologic deficits.    Assessment and Plan:   Principal Problem:    Schizophrenia, paranoid (HCC)  Active Problems:    Mild intellectual disability    Chronic post-traumatic stress disorder (PTSD)    Tobacco use disorder  Resolved Problems:    Paranoid behavior (HCC)    Elevated BP    Plan:   Continue present medication(s)    Follow with Psych   He is medically stable. I will monitor for any changes or concerns.       Discharge planning:   home     Reviewed treatment plans with the patient and/or family.             Electronically signed by Kira Johnston MD on 2/2/2024 at 11:15 PM   
Progress Note  Troy Landers  2/2/2024 8:30 AM  Subjective:   Admit Date:   1/30/2024      CC/ADMIT DX:       Interval History:   Reviewed overnight events and nursing notes. He has had no new medical issues.     I have reviewed all labs/diagnostics from the last 24hrs.       ROS:   I have done a 10 point ROS and all are negative, except what is mentioned in the HPI.    ADULT DIET; Regular; Safety Tray; Safety Tray (Disposables)    Medications:      divalproex  1,500 mg Oral Nightly    cetirizine  10 mg Oral Daily    pantoprazole  40 mg Oral QAM AC    prazosin  2 mg Oral Nightly    risperiDONE  2 mg Oral BID    QUEtiapine  300 mg Oral Nightly    amLODIPine  10 mg Oral Daily    nicotine  1 patch TransDERmal Daily    melatonin  5 mg Oral Nightly           Objective:   Vitals: /79   Pulse 79   Temp 97.6 °F (36.4 °C) (Tympanic)   Resp 16   Wt 95.7 kg (211 lb)   SpO2 98%   BMI 35.11 kg/m²  No intake or output data in the 24 hours ending 02/02/24 0830  General appearance: alert and cooperative with exam  Extremities: extremities normal, atraumatic, no cyanosis or edema  Neurologic:  No obvious focal neurologic deficits.    Assessment and Plan:   Principal Problem:    Schizophrenia, paranoid (HCC)  Active Problems:    Mild intellectual disability    Chronic post-traumatic stress disorder (PTSD)    Tobacco use disorder  Resolved Problems:    Paranoid behavior (HCC)    Elevated BP    Plan:   Continue present medication(s)    Follow with Psych   Monitor BP      Discharge planning:   home     Reviewed treatment plans with the patient and/or family.             Electronically signed by Kira Johnston MD on 2/2/2024 at 8:30 AM   
Progress Note  Troy Landers  2/3/2024 5:59 PM  Subjective:   Admit Date:   1/30/2024      CC/ADMIT DX:       Interval History:   Reviewed overnight events and nursing notes. He has had no new medical issues.   I have reviewed all labs/diagnostics from the last 24hrs.       ROS:   I have done a 10 point ROS and all are negative, except what is mentioned in the HPI.    ADULT DIET; Regular; Safety Tray; Safety Tray (Disposables)    Medications:      [START ON 2/4/2024] nicotine  1 patch TransDERmal Daily    divalproex  1,500 mg Oral Nightly    cetirizine  10 mg Oral Daily    pantoprazole  40 mg Oral QAM AC    prazosin  2 mg Oral Nightly    risperiDONE  2 mg Oral BID    QUEtiapine  300 mg Oral Nightly    amLODIPine  10 mg Oral Daily    melatonin  5 mg Oral Nightly           Objective:   Vitals: BP (!) 137/95   Pulse 74   Temp 97.5 °F (36.4 °C) (Tympanic)   Resp 16   Wt 95.7 kg (211 lb)   SpO2 100%   BMI 35.11 kg/m²  No intake or output data in the 24 hours ending 02/03/24 1759  General appearance: alert and cooperative with exam  Extremities: extremities normal, atraumatic, no cyanosis or edema  Neurologic:  No obvious focal neurologic deficits.    Assessment and Plan:   Principal Problem:    Schizophrenia, paranoid (HCC)  Active Problems:    Mild intellectual disability    Chronic post-traumatic stress disorder (PTSD)    Tobacco use disorder  Resolved Problems:    Paranoid behavior (HCC)    Elevated BP    Plan:   Continue present medication(s)    Follow with Psych   Monitor BP      Discharge planning:   home     Reviewed treatment plans with the patient and/or family.             Electronically signed by Kira Johnston MD on 2/3/2024 at 5:59 PM   
Progress Note  Troy Landers  2/4/2024 12:21 PM  Subjective:   Admit Date:   1/30/2024      CC/ADMIT DX:       Interval History:   Reviewed overnight events and nursing notes. He denies any new physical complaints.   I have reviewed all labs/diagnostics from the last 24hrs.       ROS:   I have done a 10 point ROS and all are negative, except what is mentioned in the HPI.    ADULT DIET; Regular; Safety Tray; Safety Tray (Disposables)    Medications:      nicotine  1 patch TransDERmal Daily    divalproex  1,500 mg Oral Nightly    cetirizine  10 mg Oral Daily    pantoprazole  40 mg Oral QAM AC    prazosin  2 mg Oral Nightly    risperiDONE  2 mg Oral BID    QUEtiapine  300 mg Oral Nightly    amLODIPine  10 mg Oral Daily    melatonin  5 mg Oral Nightly           Objective:   Vitals: /86   Pulse 85   Temp 98.2 °F (36.8 °C)   Resp 17   Wt 95.7 kg (211 lb)   SpO2 97%   BMI 35.11 kg/m²  No intake or output data in the 24 hours ending 02/04/24 1221  General appearance: alert and cooperative with exam  Extremities: extremities normal, atraumatic, no cyanosis or edema  Neurologic:  No obvious focal neurologic deficits.    Assessment and Plan:   Principal Problem:    Schizophrenia, paranoid (HCC)  Active Problems:    Mild intellectual disability    Chronic post-traumatic stress disorder (PTSD)    Tobacco use disorder  Resolved Problems:    Paranoid behavior (HCC)    Elevated BP    Plan:   Continue present medication(s)    Follow with Psych   Follow BP      Discharge planning:   home     Reviewed treatment plans with the patient and/or family.             Electronically signed by Kira Johnston MD on 2/4/2024 at 12:21 PM   
Progress Note  Troy Landers  2/5/2024 8:37 PM  Subjective:   Admit Date:   1/30/2024      CC/ADMIT DX:       Interval History:   Reviewed overnight events and nursing notes. He has had no medical complaints.   I have reviewed all labs/diagnostics from the last 24hrs.       ROS:   I have done a 10 point ROS and all are negative, except what is mentioned in the HPI.    ADULT DIET; Regular; Safety Tray; Safety Tray (Disposables)    Medications:      nicotine  1 patch TransDERmal Daily    divalproex  1,500 mg Oral Nightly    cetirizine  10 mg Oral Daily    pantoprazole  40 mg Oral QAM AC    prazosin  2 mg Oral Nightly    risperiDONE  2 mg Oral BID    QUEtiapine  300 mg Oral Nightly    amLODIPine  10 mg Oral Daily    melatonin  5 mg Oral Nightly           Objective:   Vitals: BP (!) 159/90   Pulse 97   Temp 97.7 °F (36.5 °C)   Resp 16   Wt 95.7 kg (211 lb)   SpO2 98%   BMI 35.11 kg/m²  No intake or output data in the 24 hours ending 02/05/24 2037  General appearance: alert and cooperative with exam  Extremities: extremities normal, atraumatic, no cyanosis or edema  Neurologic:  No obvious focal neurologic deficits.    Assessment and Plan:   Principal Problem:    Schizophrenia, paranoid (HCC)  Active Problems:    Mild intellectual disability    Chronic post-traumatic stress disorder (PTSD)    Tobacco use disorder  Resolved Problems:    Paranoid behavior (HCC)    Elevated BP    Plan:   Continue present medication(s)    Follow with Psych  Monitor BP      Discharge planning:   home     Reviewed treatment plans with the patient and/or family.             Electronically signed by Kira Johnston MD on 2/5/2024 at 8:37 PM   
Progress Note  Troy Landers  2/7/2024 8:39 AM  Subjective:   Admit Date:   1/30/2024      CC/ADMIT DX:       Interval History:   Reviewed overnight events and nursing notes. He denies any new physical complaints.   I have reviewed all labs/diagnostics from the last 24hrs.       ROS:   I have done a 10 point ROS and all are negative, except what is mentioned in the HPI.    No diet orders on file    Medications:               Objective:   Vitals: /69   Pulse 68   Temp 96.8 °F (36 °C) (Tympanic)   Resp 16   Wt 95.7 kg (211 lb)   SpO2 96%   BMI 35.11 kg/m²  No intake or output data in the 24 hours ending 02/07/24 0839  General appearance: alert and cooperative with exam  Extremities: extremities normal, atraumatic, no cyanosis or edema  Neurologic:  No obvious focal neurologic deficits.    Assessment and Plan:   Principal Problem:    Schizophrenia, paranoid (HCC)  Active Problems:    Mild intellectual disability    Chronic post-traumatic stress disorder (PTSD)    Tobacco use disorder  Resolved Problems:    Paranoid behavior (HCC)    Elevated BP    Plan:   Continue present medication(s)    Follow with Psych   Follow with BP      Discharge planning:   home     Reviewed treatment plans with the patient and/or family.             Electronically signed by Kira Johnston MD on 2/7/2024 at 8:39 AM   
Pt arrived on unit with ER staff and security at 1410.  Pt appropriately dressed in paper scrubs.  Search and skin assessment performed by Catalino LANCE, Cathy MORELAND, and Jean Pierre BALTAZAR, with no contraband discovered.  Vitals obtained, 15 minute rounds initiated. Pt calm and cooperative.  Will continue to monitor for safety and behaviors.  
[facility] Adult Unit Daily Assessment  Nursing Progress Note    Room: [facility]   Name: Troy Landers   Age: 38 y.o.   Gender: male   Dx: Schizophrenia, paranoid (HCC)  Precautions: suicide risk  Inpatient Status: voluntary     SLEEP:  Sleep Quality Good  Sleep Medications: Yes   PRN Sleep Meds: No     MEDICAL:  Other PRN Meds: No   Med Compliant: Yes  Accu-Chek: No  Oxygen/CPAP/BiPAP: No  CIWA/CINA: No   PAIN Assessment: none  Side Effects from medication: No    Metabolic Screening:  Lab Results   Component Value Date    LABA1C 5.6 11/08/2023     Lab Results   Component Value Date    CHOL 218 (H) 11/08/2023    CHOL 175 01/18/2023    CHOL 169 09/22/2022    CHOL 205 (H) 04/01/2021    CHOL 203 (H) 09/21/2020    CHOL 166 03/19/2019    CHOL 167 09/28/2018    CHOL 163 08/31/2018    CHOL 167 01/18/2017    CHOL 147 (L) 08/29/2016     Lab Results   Component Value Date    TRIG 218 (H) 11/08/2023    TRIG 101 01/18/2023    TRIG 112 09/22/2022    TRIG 191 (H) 04/01/2021    TRIG 214 (H) 09/21/2020    TRIG 154 (H) 03/19/2019    TRIG 190 (H) 09/28/2018    TRIG 103 08/31/2018    TRIG 92 (L) 01/18/2017    TRIG 91 (L) 08/29/2016     Lab Results   Component Value Date    HDL 66 11/08/2023    HDL 55 01/18/2023    HDL 53 (L) 09/22/2022    HDL 49 (L) 04/01/2021    HDL 50 (L) 09/21/2020    HDL 47 (L) 03/19/2019    HDL 54 (L) 09/28/2018    HDL 55 08/31/2018    HDL 60 01/18/2017    HDL 57 08/29/2016     No components found for: \"LDLCAL\"  No components found for: \"LABVLDL\"  Body mass index is 35.11 kg/m².  BP Readings from Last 2 Encounters:   01/31/24 129/80   11/08/23 (!) 130/58       Medical Bed:   Is patient in a medical bed? no   If medical bed is in use, has nursing secured room while patient is awake and out of the room? NA  Has safety checks by nursing been completed on the bed/room this shift? no    Protective Factors:  Patient identifies protective factors with nursing staff as follows:   Identifies reasons for living: 
SW met with patient to complete psychosocial, lifetime CSSR-S and OQ-30 Questionnaire on this date. Patient unable to assess due to psychosis.     
SW met with pt to complete psychosocial,CSSRS,OQ evaluation. Pt refused. Unable to assess.  
[facility] Adult Unit Daily Assessment  Nursing Progress Note    Room: [facility]   Name: Troy Landers   Age: 38 y.o.   Gender: male   Dx: Schizophrenia, paranoid (HCC)  Precautions: close watch  Inpatient Status: voluntary     SLEEP:  Sleep Quality  fair to poor, prn Trazodone 100 mg admin for problems sleeping  Sleep Medications: Yes and risperdal 2 mg, seroquel 300 mg, minipress 2 mg   PRN Sleep Meds: Yes and trazodone 100 mg     MEDICAL:  Other PRN Meds: No   Med Compliant: Yes  Accu-Chek: Yes  Oxygen/CPAP/BiPAP: No  CIWA/CINA: No   PAIN Assessment: none  Side Effects from medication: No     Metabolic Screening:  Lab Results   Component Value Date    LABA1C 5.6 11/08/2023     Lab Results   Component Value Date    CHOL 218 (H) 11/08/2023    CHOL 175 01/18/2023    CHOL 169 09/22/2022    CHOL 205 (H) 04/01/2021    CHOL 203 (H) 09/21/2020    CHOL 166 03/19/2019    CHOL 167 09/28/2018    CHOL 163 08/31/2018    CHOL 167 01/18/2017    CHOL 147 (L) 08/29/2016     Lab Results   Component Value Date    TRIG 218 (H) 11/08/2023    TRIG 101 01/18/2023    TRIG 112 09/22/2022    TRIG 191 (H) 04/01/2021    TRIG 214 (H) 09/21/2020    TRIG 154 (H) 03/19/2019    TRIG 190 (H) 09/28/2018    TRIG 103 08/31/2018    TRIG 92 (L) 01/18/2017    TRIG 91 (L) 08/29/2016     Lab Results   Component Value Date    HDL 66 11/08/2023    HDL 55 01/18/2023    HDL 53 (L) 09/22/2022    HDL 49 (L) 04/01/2021    HDL 50 (L) 09/21/2020    HDL 47 (L) 03/19/2019    HDL 54 (L) 09/28/2018    HDL 55 08/31/2018    HDL 60 01/18/2017    HDL 57 08/29/2016     No components found for: \"LDLCAL\"  No components found for: \"LABVLDL\"  Body mass index is 35.11 kg/m².  BP Readings from Last 2 Encounters:   02/03/24 (!) 146/78   11/08/23 (!) 130/58       Medical Bed:   Is patient in a medical bed? NA   If medical bed is in use, has nursing secured room while patient is awake and out of the room? NA  Has safety checks by nursing been completed on the bed/room this shift? 
Sw spoke with his case management. Gia Toro [phone] who stated that she spoke with the patient last night and she is working on getting him a full time sitter in the home with him and should have that by Monday or Tuesday  
Sw with  Chris at [phone] and [phone] at [facility] she stated that he would will have a sitter for 12 hours a day.  Patient lives alone and they don't have residential care and they don't feel comfortable with him living alone and asked if the doctor could right a letter stating that.  Jae stated that no one would be able to provide him with a letter stating what conditions that the patient needs to have after leaving the hospital.   
Treatment Team Note:     Target Symptoms/Reason for admission: Per nursing admission assessment -       Diagnoses per psych provider: Paranoia (Formerly [facility]) [F22]  Paranoid behavior ([facility]) [F22]     Therapist met with treatment team to discuss patients treatment and discharge plans.     Patient's aftercare plan is: SW will meet with patient to gather information     Aftercare appointments made: No - SW will make discharge appointments     Pt lives with: alone with sitter     Collateral obtained from: SW will meet with patient to gather information  Collateral obtained on:new admission     Attending groups: New admission - SW will monitor group attendance     Behavior: Patient has been isolative to his room. He reports having auditory hallucinations would did not want to discuss them to prevent them from coming back. Patients reports that he is very paranoid. Patient is med compliant and cooperative. Patient is resting.         Has patient been completing ADL's:  New admission - unknown at this time - SW will monitor     SI:  patient denies SI  Plan: no   If yes describe: N/A - patient denies plan  HI:  patient denies HI  If present describe: N/A  Delusions: patient denies delusions  If present describe: N/A  Hallucinations: patient denies hallucinations  If present describe: N/A     Patient rates their -- Depression: 1-10:  02  Anxiety:1-10:  10     Sleeping:fair     Taking medication: yes     Misc:                                                       
Unit received a prudencio from paitent mom and she said that the is working in [place] until Wednesday or Thursday and she called back and stated that she would come by the end of the day  
Yes   Supportive Social Network or family: Yes    Belief that suicide is immoral/high spirituality: No   Responsibility to family or others/living with family: Yes   Fear of death or dying due to pain and suffering: No   Engaged in work or school: Yes  If Patient is unable to identify, reason why? N/A    Depression: 0   Anxiety: 0   SI denies suicidal ideation   Risk of Suicide: No Risk  HI Negative for homicidal ideation        AVH:no If Hallucinations are present, describe? Pt denies at this time.    Appetite: no change from normal   Percent Meals: No meals on shift.   Social: Pt out on unit but not interacting with other patients.  Speech: hesitant   Appearance: appropriately dressed and healthy looking    GROUP:  Group Participation: Yes  Participation Quality: Minimal    Notes: Pt out on unit. Pt slow to respond to questions. Pt upset that he did not get discharged today. Pt denies anxiety but he is visibly anxious. Pt denies any other needs.    Electronically signed by Dulce Gunter RN on 2/5/24 at 10:33 PM CST  
Yes   Supportive Social Network or family: Yes    Belief that suicide is immoral/high spirituality: Yes   Responsibility to family or others/living with family: No   Fear of death or dying due to pain and suffering: Yes   Engaged in work or school: No  If Patient is unable to identify, reason why?      Depression: \"I feel ok\"   Anxiety: \"I feel ok\"   SI denies suicidal ideation   Risk of Suicide: No Risk  HI Negative for homicidal ideation        AVH:yes If Hallucinations are present, describe? Voices- \"They are bothering me all in my head\"    Appetite: good   Percent Meals: 75%   Social: No   Speech: hesitant   Appearance: appropriately dressed, appropriately groomed, and healthy looking    GROUP:  Group Participation: No  Participation Quality: None    Notes:     Pt states that he slept ok.  He has a good appetite.  He is not social with peers but does talk to staff in a whisper.  He c/o voices ands received a prn for that.  He has been cooperative with staff.  He denies SI, HI and AVH    Electronically signed by Afshan Quintana RN on 1/31/24 at 5:56 PM CST  
Yes   Supportive Social Network or family: Yes    Belief that suicide is immoral/high spirituality: Yes   Responsibility to family or others/living with family: Yes   Fear of death or dying due to pain and suffering: No   Engaged in work or school: Yes  If Patient is unable to identify, reason why?     Depression: 2   Anxiety: 10   SI denies suicidal ideation   Risk of Suicide: No Risk  HI Negative for homicidal ideation        AVH:yes If Hallucinations are present, describe? Auditory     Appetite: good   Percent Meals:    Social: No   Speech: normal   Appearance: appropriately dressed    GROUP:  Group Participation: No  Participation Quality: None    Notes:     Patient has been isolative to his room. He reports having auditory hallucinations would did not want to discuss them to prevent them from coming back. Patients reports that he is very paranoid. Patient is med compliant and cooperative. Patient is resting.     Electronically signed by RAMÓN HANNON RN on 1/30/24 at 11:25 PM CST  
follows:   Identifies reasons for living: Yes   Supportive Social Network or family: Yes    Belief that suicide is immoral/high spirituality: Yes   Responsibility to family or others/living with family: No   Fear of death or dying due to pain and suffering: No   Engaged in work or school: No  If Patient is unable to identify, reason why?     Depression: 0   Anxiety: 0   SI denies suicidal ideation   Risk of Suicide: No Risk  HI Negative for homicidal ideation        AVH:no If Hallucinations are present, describe? no  Appetite: good   Percent Meals: 100%   Social: Guarded   Speech: soft   Appearance: poor hygiene    GROUP: Participated  Group Participation: Yes  Participation Quality: Active Listener    Notes:  Patient today had this writer call several numbers,  explained about being paranoid, but was able to tell this writer about his Risperdal. Patient also inquired about his room number, and wanting to know if it was only for those that are paranoid. Patient attended groups, interactive with staff, consuming meals, but has not completed hygiene thus far. Will encourage        Electronically signed by Becca Wheeler RN on 2/2/24 at 1:09 PM CST  
nursing been completed on the bed/room this shift? yes    Protective Factors:  Patient identifies protective factors with nursing staff as follows:   Identifies reasons for living: Yes   Supportive Social Network or family: Yes    Belief that suicide is immoral/high spirituality: Yes   Responsibility to family or others/living with family: No   Fear of death or dying due to pain and suffering: No   Engaged in work or school: No  If Patient is unable to identify, reason why?     Depression: 0 denied  Anxiety: 0 denied  SI denies suicidal ideation   Risk of Suicide: No Risk  HI Negative for homicidal ideation        AVH:no If Hallucinations are present, describe? Denied, but stated that he felt paranoid \"a little\", but incongruent and pt looking scared, nervous and paranoid and pt constricted and did not talk about what is upsetting him    Appetite: no change from normal   Percent Meals: no meals this shift, pt is eating snacks and intake fluids   Social: No   Speech: hesitant and very soft and quiet   Appearance: appropriately dressed, appropriately groomed, and healthy looking    GROUP:  Group Participation: Yes  Participation Quality: Minimal    Notes: pt in room, isolative to self and room, pt speech soft, quiet, pt affect labile, pt appears fearful and nervous, but when asked pt denies, anxiety and depression. Pt denies suicidal and homicidal ideas and hallucination. Pt states that he is paranoid \" a little\" pt is groomed, appetite is good, pt participated in wrap up group and pt is not social and speech is soft spoken and muffled. Pt guarded and watchful of others. Pt is fair to poor sleep, pt awoken and came up to nurse station and got snack, but refused prn Trazodone. Pt went back to room to lay down. Will continue to monitor.   Electronically signed by Adry Hanna RN on 2/3/24 at 2:50 AM CST  
Never        Family History:     Family History   Family history unknown: Yes         Vital Signs:  Last set of tests and vitals:  Vitals:    02/02/24 0735   BP: 124/79   Pulse: 79   Resp: 16   Temp: 97.6 °F (36.4 °C)   SpO2: 98%          Mental Status:  @Level of consciousness:  within normal limits and awake  Appearance:  well-appearing, in chair, good grooming, and fair hygiene  Behavior/Motor:  no abnormalities noted  Attitude toward examiner:  cooperative, attentive, and good eye contact  Speech:  normal rate and low volume  Mood:  \"I am pretty good today\"  Affect:  mood congruent  Thought processes:  linear and goal directed  Thought content:  Homocidal ideation denies  Suicidal Ideation:  denies suicidal ideation  Delusions:  no evidence of delusions  Perceptual Disturbance:  denies any perceptual disturbance currently  Cognition:  oriented to person, place, and time  Concentration good  Memory intact for recent and remote  Fund of knowledge:  average  Abstract thinking:  adequate  Insight:  limited  Judgment:  limited     divalproex  1,500 mg Oral Nightly    cetirizine  10 mg Oral Daily    pantoprazole  40 mg Oral QAM AC    prazosin  2 mg Oral Nightly    risperiDONE  2 mg Oral BID    QUEtiapine  300 mg Oral Nightly    amLODIPine  10 mg Oral Daily    nicotine  1 patch TransDERmal Daily    melatonin  5 mg Oral Nightly       Current Medications:  Current Facility-Administered Medications   Medication Dose Route Frequency Provider Last Rate Last Admin    traZODone (DESYREL) tablet 100 mg  100 mg Oral Nightly PRN Kasey Bettencourt APRN - CNP        divalproex (DEPAKOTE ER) extended release tablet 1,500 mg  1,500 mg Oral Nightly ReimniShalom cacerese, APRN   1,500 mg at 02/01/24 2039    cetirizine (ZYRTEC) tablet 10 mg  10 mg Oral Daily Reimnitz, Terrie, APRN   10 mg at 02/02/24 0845    pantoprazole (PROTONIX) tablet 40 mg  40 mg Oral QAM AC Reimnitz, Terrie, APRN   40 mg at 02/02/24 0626    prazosin 
tablet 10 mg  10 mg Oral Daily Reimnitz, Terrie, APRN   10 mg at 02/01/24 0803    pantoprazole (PROTONIX) tablet 40 mg  40 mg Oral QAM AC Reimnitz, Terrie, APRN   40 mg at 02/01/24 0605    prazosin (MINIPRESS) capsule 2 mg  2 mg Oral Nightly Reimnitz, Terrie, APRN   2 mg at 01/31/24 2131    risperiDONE (RISPERDAL) tablet 2 mg  2 mg Oral BID Reimnitz, Terrie, APRN   2 mg at 02/01/24 0803    QUEtiapine (SEROQUEL) tablet 300 mg  300 mg Oral Nightly Reimnitz, Terrie, APRN   300 mg at 01/31/24 2131    amLODIPine (NORVASC) tablet 10 mg  10 mg Oral Daily Reimnitz, Terrie, APRN   10 mg at 02/01/24 0804    acetaminophen (TYLENOL) tablet 650 mg  650 mg Oral Q4H PRN Mac Mclaughlin MD        polyethylene glycol (GLYCOLAX) packet 17 g  17 g Oral Daily PRN Mac Mclaughlin MD        nicotine polacrilex (COMMIT) lozenge 2 mg  2 mg Oral Q1H PRN Mac Mclaughlin MD        nicotine (NICODERM CQ) 14 MG/24HR 1 patch  1 patch TransDERmal Daily Mac Mclaughlin MD   1 patch at 01/31/24 1256    traZODone (DESYREL) tablet 50 mg  50 mg Oral Nightly Mac Mclaughlin MD   50 mg at 01/31/24 2131    melatonin disintegrating tablet 5 mg  5 mg Oral Nightly Mac Mclaughlin MD   5 mg at 01/31/24 2133    hydrOXYzine HCl (ATARAX) tablet 25 mg  25 mg Oral TID PRN Mac Mclaughlin MD   25 mg at 01/31/24 1521       Psychotherapy:   SUPPORTIVE    DSM V Diagnoses:    Schizophrenia, paranoid type  Mild Intellectual Disability  Chronic PTSD  Tobacco use disorder      Plan:    Encourage group therapy  15 minute safety checks  Medical monitoring by Dr. Johsnton and associates  Continue current therapy and medications      Amount of time spent with patient:  35 minutes with greater than 50% of the time spent in counseling and collaboration of care.    Terrie Child, PMHALP-BC, FNP-C   Clinician Signature: signed electronically      
completed by Cathy Nunez  And Jean Pierre A and  no contraband discovered.    Patient belongings and valuables was cataloged and accounted for by Jean Pierre and Catalino.   Oriented to unit, unit policy and expectations:  yes    Reviewed and explained all legal documents:  yes    Education for Fall Prevention and Restraints given: yes    Patient signed all legal documents yes   Pt verbalizes understanding:yes       Medical:  Order for Medical H&P placed? yes    Was medical provider notified? yes      Electronically signed by Catalino Jasso RN on 1/30/24 at 3:54 PM CST  
omeprazole (PRILOSEC) 20 MG delayed release capsule, GIVE ONE CAPSULE BY MOUTH EVERY MORNING (BEFORE BREAKFAST), Disp: 31 capsule, Rfl: 11    hydroCHLOROthiazide (HYDRODIURIL) 12.5 MG tablet, Take 1 tablet by mouth daily, Disp: 30 tablet, Rfl: 1    amLODIPine (NORVASC) 10 MG tablet, Take 1 tablet by mouth daily, Disp: 30 tablet, Rfl: 1    risperiDONE (RISPERDAL) 2 MG tablet, Take 1 tablet by mouth nightly, Disp: 30 tablet, Rfl: 1    risperiDONE (RISPERDAL) 3 MG tablet, Take 1 tablet by mouth 2 times daily, Disp: 60 tablet, Rfl: 1    divalproex (DEPAKOTE ER) 500 MG extended release tablet, Take 3 tablets by mouth nightly, Disp: 90 tablet, Rfl: 1    prazosin (MINIPRESS) 1 MG capsule, Take 2 capsules by mouth nightly, Disp: , Rfl:     Misc. Devices MISC, Daily weights, Disp: 1 each, Rfl: 0    polyethylene glycol (GLYCOLAX) 17 GM/SCOOP powder, MIX 17GM (1 CAPFUL) IN LIQUID AND GIVE BY MOUTH ONCE DAILY AS NEEDED FOR CONSTIPATION, Disp: 510 g, Rfl: 11    Calcium 500-2.5 MG-MCG CHEW, CHEW 2 TABLETS BY MOUTH ONCE DAILY AS NEEDED FOR HEARTBURN, Disp: 60 tablet, Rfl: 11    ibuprofen (ADVIL;MOTRIN) 800 MG tablet, GIVE ONE TABLET BY MOUTH EVERY 8 HOURS AS NEEDED FOR PAIN, Disp: 30 tablet, Rfl: 11    olopatadine (PATADAY) 0.2 % SOLN ophthalmic solution, INSTILL ONE DROP IN  BOTH EYES ONCE DAILY AS NEEDED, Disp: 2.5 mL, Rfl: 11    latanoprost (XALATAN) 0.005 % ophthalmic solution, 1 drop nightly, Disp: , Rfl:     QUEtiapine (SEROQUEL) 300 MG tablet, Take 400 mg by mouth nightly, Disp: , Rfl:     acetaminophen (TYLENOL) 500 MG tablet, Take 2 tablets by mouth every 4 hours as needed for Pain, Disp: , Rfl:        Collateral Information:     Name:   Relationship:   Phone Number:   Collateral:     Disposition:     Choose one of the options below for disposition:     1. Decision to admit to :yes    Is patient voluntary: yes  If no has a 72 hold been initiated: no  Admission Diagnosis: Paranoid    Does the patient have a guardian or

## 2024-02-08 ENCOUNTER — OFFICE VISIT (OUTPATIENT)
Dept: FAMILY MEDICINE CLINIC | Age: 39
End: 2024-02-08
Payer: MEDICARE

## 2024-02-08 VITALS
DIASTOLIC BLOOD PRESSURE: 78 MMHG | HEART RATE: 114 BPM | OXYGEN SATURATION: 99 % | WEIGHT: 215.25 LBS | SYSTOLIC BLOOD PRESSURE: 132 MMHG | TEMPERATURE: 98.1 F | BODY MASS INDEX: 35.82 KG/M2

## 2024-02-08 DIAGNOSIS — F20.89 OTHER SCHIZOPHRENIA (HCC): Primary | ICD-10-CM

## 2024-02-08 DIAGNOSIS — K21.9 GASTROESOPHAGEAL REFLUX DISEASE WITHOUT ESOPHAGITIS: ICD-10-CM

## 2024-02-08 DIAGNOSIS — J30.9 ALLERGIC RHINITIS, UNSPECIFIED SEASONALITY, UNSPECIFIED TRIGGER: ICD-10-CM

## 2024-02-08 PROCEDURE — 1111F DSCHRG MED/CURRENT MED MERGE: CPT | Performed by: FAMILY MEDICINE

## 2024-02-08 PROCEDURE — G8482 FLU IMMUNIZE ORDER/ADMIN: HCPCS | Performed by: FAMILY MEDICINE

## 2024-02-08 PROCEDURE — 4004F PT TOBACCO SCREEN RCVD TLK: CPT | Performed by: FAMILY MEDICINE

## 2024-02-08 PROCEDURE — 3075F SYST BP GE 130 - 139MM HG: CPT | Performed by: FAMILY MEDICINE

## 2024-02-08 PROCEDURE — G8427 DOCREV CUR MEDS BY ELIG CLIN: HCPCS | Performed by: FAMILY MEDICINE

## 2024-02-08 PROCEDURE — 3078F DIAST BP <80 MM HG: CPT | Performed by: FAMILY MEDICINE

## 2024-02-08 PROCEDURE — 99214 OFFICE O/P EST MOD 30 MIN: CPT | Performed by: FAMILY MEDICINE

## 2024-02-08 PROCEDURE — G8417 CALC BMI ABV UP PARAM F/U: HCPCS | Performed by: FAMILY MEDICINE

## 2024-02-08 RX ORDER — LATANOPROST 50 UG/ML
1 SOLUTION/ DROPS OPHTHALMIC NIGHTLY
COMMUNITY

## 2024-02-08 RX ORDER — LORATADINE 10 MG/1
TABLET ORAL
Qty: 31 TABLET | Refills: 11 | Status: SHIPPED | OUTPATIENT
Start: 2024-02-08

## 2024-02-08 RX ORDER — ACETAMINOPHEN 500 MG
1000 TABLET ORAL EVERY 4 HOURS PRN
Qty: 120 TABLET | Refills: 5 | Status: SHIPPED | OUTPATIENT
Start: 2024-02-08

## 2024-02-08 RX ORDER — PANTOPRAZOLE SODIUM 40 MG/1
TABLET, DELAYED RELEASE ORAL
Qty: 31 TABLET | Refills: 11 | Status: SHIPPED | OUTPATIENT
Start: 2024-02-08

## 2024-02-08 ASSESSMENT — ENCOUNTER SYMPTOMS
EYES NEGATIVE: 1
RESPIRATORY NEGATIVE: 1
ALLERGIC/IMMUNOLOGIC NEGATIVE: 1
GASTROINTESTINAL NEGATIVE: 1

## 2024-02-08 NOTE — PROGRESS NOTES
SUBJECTIVE:    Patient ID: Troy Landers is a 38 y.o.male.    HPI:   Patient here for hospital follow-up  Patient is a 38-year-old -American male.  He was admitted to [facility] psychiatric unit secondary to noncompliant with medications causing him to become psychotic.  He had history of schizophrenia and mental disabilities.  His medication was restarted and he did well.  He was discharged home.  Patient's denies any hallucinations or self-harm thoughts.  Some of his medication were adjusted while in the hospital.  He was taken off of some of his blood pressure medication.  Blood pressure is well-controlled.  He needs refills on Protonix for acid reflux that he use with good results also need a prescription for Claritin that he use for allergies.         Past Medical History:   Diagnosis Date    Allergic rhinitis     Chronic post-traumatic stress disorder (PTSD) 1/31/2024    Episodic mood disorder ([facility])     Hyperlipidemia     Hypertension     Mild intellectual disabilities     Morbid obesity with BMI of 40.0-44.9, adult ([facility]) 9/18/2019    Schizophrenia ([facility])       Current Outpatient Medications   Medication Sig Dispense Refill    latanoprost (XALATAN) 0.005 % ophthalmic solution 1 drop nightly      loratadine (CLARITIN) 10 MG tablet GIVE ONE TABLET BY MOUTH ONCE DAILY.  DX: ALLERGIES 31 tablet 11    pantoprazole (PROTONIX) 40 MG tablet GIVE ONE TABLET BY MOUTH EVERY MORNING BEFORE BREAKFAST 31 tablet 11    acetaminophen (TYLENOL) 500 MG tablet Take 2 tablets by mouth every 4 hours as needed for Pain or Fever 120 tablet 5    divalproex (DEPAKOTE ER) 500 MG extended release tablet Take 3 tablets by mouth nightly 90 tablet 0    QUEtiapine (SEROQUEL) 300 MG tablet Take 1 tablet by mouth nightly 30 tablet 0    risperiDONE (RISPERDAL) 2 MG tablet Take 1 tablet by mouth 2 times daily 60 tablet 0    traZODone (DESYREL) 100 MG tablet Take 1 tablet by mouth nightly as needed for Sleep 30 tablet 0    amLODIPine

## 2024-04-04 RX ORDER — AMLODIPINE BESYLATE 10 MG/1
TABLET ORAL
Qty: 31 TABLET | Refills: 11 | OUTPATIENT
Start: 2024-04-04

## 2024-04-18 RX ORDER — AMLODIPINE BESYLATE 10 MG/1
TABLET ORAL
Qty: 31 TABLET | Refills: 11 | OUTPATIENT
Start: 2024-04-18

## 2024-05-01 ENCOUNTER — TELEPHONE (OUTPATIENT)
Dept: FAMILY MEDICINE CLINIC | Age: 39
End: 2024-05-01

## 2024-05-01 NOTE — TELEPHONE ENCOUNTER
Vitamin D use is fine but benztropine if he is taking it is coming from the psychiatrist.  He need to prescribe it to him if needed

## 2024-05-01 NOTE — TELEPHONE ENCOUNTER
This may be a duplicate, but Jadyn with [facility] called this morning and I transferred her to [facility]. She called back and said no one had called her back.  She is just needing scripts for Vit D3 and Benztropine sent over to them to have on file. They can not administer these medications without having them on file.    I didn't see an active rx for either of these. Looks like the benztropine was d/c 2/5/24 at discharge. She needs to know if he needs to be taking it or not.  If not, he will need a script stating it has been discontinued.    She stated that he just transferred from [facility] to [facility] in the last several weeks.  Their fax is [phone].  If any questions: call [facility] med coordinator at [phone]

## 2024-05-01 NOTE — TELEPHONE ENCOUNTER
I spoke with Jadyn. I don't see an active rx for either of these medications either. Jadyn was looking at the rx for vit d. She said there was another providers name on the rx so she was going to call that office. I let her know that if she needed anything she could give us a call back.

## 2024-05-02 RX ORDER — AMLODIPINE BESYLATE 10 MG/1
TABLET ORAL
Qty: 31 TABLET | Refills: 11 | OUTPATIENT
Start: 2024-05-02

## 2024-05-07 ENCOUNTER — OFFICE VISIT (OUTPATIENT)
Dept: FAMILY MEDICINE CLINIC | Age: 39
End: 2024-05-07
Payer: MEDICARE

## 2024-05-07 VITALS
SYSTOLIC BLOOD PRESSURE: 132 MMHG | DIASTOLIC BLOOD PRESSURE: 76 MMHG | BODY MASS INDEX: 40.44 KG/M2 | WEIGHT: 243 LBS | HEART RATE: 86 BPM | OXYGEN SATURATION: 96 % | TEMPERATURE: 98 F

## 2024-05-07 DIAGNOSIS — J30.9 ALLERGIC RHINITIS, UNSPECIFIED SEASONALITY, UNSPECIFIED TRIGGER: ICD-10-CM

## 2024-05-07 DIAGNOSIS — K21.9 GASTROESOPHAGEAL REFLUX DISEASE WITHOUT ESOPHAGITIS: ICD-10-CM

## 2024-05-07 DIAGNOSIS — I10 ESSENTIAL HYPERTENSION: Primary | ICD-10-CM

## 2024-05-07 DIAGNOSIS — M19.90 OSTEOARTHRITIS, UNSPECIFIED OSTEOARTHRITIS TYPE, UNSPECIFIED SITE: ICD-10-CM

## 2024-05-07 DIAGNOSIS — I10 ESSENTIAL HYPERTENSION: ICD-10-CM

## 2024-05-07 LAB
ALBUMIN SERPL-MCNC: 4.3 G/DL (ref 3.5–5.2)
ALP SERPL-CCNC: 50 U/L (ref 40–130)
ALT SERPL-CCNC: 13 U/L (ref 5–41)
ANION GAP SERPL CALCULATED.3IONS-SCNC: 13 MMOL/L (ref 7–19)
AST SERPL-CCNC: 16 U/L (ref 5–40)
BILIRUB SERPL-MCNC: 0.2 MG/DL (ref 0.2–1.2)
BUN SERPL-MCNC: 16 MG/DL (ref 6–20)
CALCIUM SERPL-MCNC: 9.6 MG/DL (ref 8.6–10)
CHLORIDE SERPL-SCNC: 98 MMOL/L (ref 98–111)
CHOLEST SERPL-MCNC: 175 MG/DL (ref 160–199)
CO2 SERPL-SCNC: 25 MMOL/L (ref 22–29)
CREAT SERPL-MCNC: 0.9 MG/DL (ref 0.5–1.2)
ERYTHROCYTE [DISTWIDTH] IN BLOOD BY AUTOMATED COUNT: 16.5 % (ref 11.5–14.5)
GLUCOSE SERPL-MCNC: 82 MG/DL (ref 74–109)
HCT VFR BLD AUTO: 44.4 % (ref 42–52)
HDLC SERPL-MCNC: 55 MG/DL (ref 55–121)
HGB BLD-MCNC: 14 G/DL (ref 14–18)
LDLC SERPL CALC-MCNC: 94 MG/DL
MCH RBC QN AUTO: 24.5 PG (ref 27–31)
MCHC RBC AUTO-ENTMCNC: 31.5 G/DL (ref 33–37)
MCV RBC AUTO: 77.8 FL (ref 80–94)
PLATELET # BLD AUTO: 265 K/UL (ref 130–400)
PMV BLD AUTO: 12.1 FL (ref 9.4–12.4)
POTASSIUM SERPL-SCNC: 4.2 MMOL/L (ref 3.5–5)
PROT SERPL-MCNC: 7.9 G/DL (ref 6.6–8.7)
RBC # BLD AUTO: 5.71 M/UL (ref 4.7–6.1)
SODIUM SERPL-SCNC: 136 MMOL/L (ref 136–145)
TRIGL SERPL-MCNC: 132 MG/DL (ref 0–149)
TSH SERPL DL<=0.005 MIU/L-ACNC: 0.86 UIU/ML (ref 0.27–4.2)
WBC # BLD AUTO: 5.2 K/UL (ref 4.8–10.8)

## 2024-05-07 PROCEDURE — 4004F PT TOBACCO SCREEN RCVD TLK: CPT | Performed by: FAMILY MEDICINE

## 2024-05-07 PROCEDURE — G8427 DOCREV CUR MEDS BY ELIG CLIN: HCPCS | Performed by: FAMILY MEDICINE

## 2024-05-07 PROCEDURE — G8417 CALC BMI ABV UP PARAM F/U: HCPCS | Performed by: FAMILY MEDICINE

## 2024-05-07 PROCEDURE — 99214 OFFICE O/P EST MOD 30 MIN: CPT | Performed by: FAMILY MEDICINE

## 2024-05-07 PROCEDURE — 3078F DIAST BP <80 MM HG: CPT | Performed by: FAMILY MEDICINE

## 2024-05-07 PROCEDURE — 3075F SYST BP GE 130 - 139MM HG: CPT | Performed by: FAMILY MEDICINE

## 2024-05-07 RX ORDER — AMLODIPINE BESYLATE 10 MG/1
10 TABLET ORAL DAILY
Qty: 30 TABLET | Refills: 1 | Status: SHIPPED | OUTPATIENT
Start: 2024-05-07

## 2024-05-07 RX ORDER — LORATADINE 10 MG/1
TABLET ORAL
Qty: 31 TABLET | Refills: 11 | Status: SHIPPED | OUTPATIENT
Start: 2024-05-07

## 2024-05-07 RX ORDER — PANTOPRAZOLE SODIUM 40 MG/1
TABLET, DELAYED RELEASE ORAL
Qty: 31 TABLET | Refills: 11 | Status: SHIPPED | OUTPATIENT
Start: 2024-05-07

## 2024-05-07 RX ORDER — ACETAMINOPHEN 500 MG
1000 TABLET ORAL EVERY 4 HOURS PRN
Qty: 120 TABLET | Refills: 5 | Status: SHIPPED | OUTPATIENT
Start: 2024-05-07

## 2024-05-07 NOTE — PROGRESS NOTES
SUBJECTIVE:    Patient ID: Troy Landers is a 38 y.o.male.    HPI:   Patient here for follow-up of multiple medical problems  Patient is a 38-year-old male.  He have hypertension.  Take blood pressure medication.  Blood pressure is well-controlled.  He have a 40 pound weight gain in the last year.  He had mental disabilities.  He is does not monitor his diet.  He have a very sedentary lifestyle.  He also have allergies take medication for this problem with good results.  He also take a PPI for acid reflux.  He is compliant with therapy.  He takes Tylenol occasionally for arthritis.  He is compliant with therapy.  Denies medication side effect.         Past Medical History:   Diagnosis Date    Allergic rhinitis     Chronic post-traumatic stress disorder (PTSD) 1/31/2024    Episodic mood disorder ([facility])     Hyperlipidemia     Hypertension     Mild intellectual disabilities     Morbid obesity with BMI of 40.0-44.9, adult ([facility]) 9/18/2019    Schizophrenia ([facility])       Current Outpatient Medications   Medication Sig Dispense Refill    loratadine (CLARITIN) 10 MG tablet GIVE ONE TABLET BY MOUTH ONCE DAILY.  DX: ALLERGIES 31 tablet 11    pantoprazole (PROTONIX) 40 MG tablet GIVE ONE TABLET BY MOUTH EVERY MORNING BEFORE BREAKFAST 31 tablet 11    amLODIPine (NORVASC) 10 MG tablet Take 1 tablet by mouth daily 30 tablet 1    acetaminophen (TYLENOL) 500 MG tablet Take 2 tablets by mouth every 4 hours as needed for Pain or Fever 120 tablet 5    Misc. Devices MISC Dc blood pressure protocol 1 each 0    latanoprost (XALATAN) 0.005 % ophthalmic solution 1 drop nightly      divalproex (DEPAKOTE ER) 500 MG extended release tablet Take 3 tablets by mouth nightly 90 tablet 0    QUEtiapine (SEROQUEL) 300 MG tablet Take 1 tablet by mouth nightly 30 tablet 0    traZODone (DESYREL) 100 MG tablet Take 1 tablet by mouth nightly as needed for Sleep 30 tablet 0    prazosin (MINIPRESS) 1 MG capsule Take 2 capsules by mouth nightly       No

## 2024-05-17 DIAGNOSIS — I10 ESSENTIAL HYPERTENSION: ICD-10-CM

## 2024-05-17 RX ORDER — AMLODIPINE BESYLATE 10 MG/1
TABLET ORAL
Qty: 31 TABLET | Refills: 11 | Status: SHIPPED | OUTPATIENT
Start: 2024-05-17

## 2024-09-20 DIAGNOSIS — I10 ESSENTIAL HYPERTENSION: ICD-10-CM

## 2024-09-20 RX ORDER — AMLODIPINE BESYLATE 10 MG/1
10 TABLET ORAL DAILY
Qty: 31 TABLET | Refills: 11 | Status: SHIPPED | OUTPATIENT
Start: 2024-09-20

## 2024-10-03 DIAGNOSIS — I10 ESSENTIAL HYPERTENSION: ICD-10-CM

## 2024-10-03 RX ORDER — AMLODIPINE BESYLATE 10 MG/1
TABLET ORAL
Qty: 342 TABLET | Refills: 0 | OUTPATIENT
Start: 2024-10-03

## 2024-10-08 DIAGNOSIS — F20.9 SCHIZOPHRENIA, UNSPECIFIED (HCC): ICD-10-CM

## 2024-10-08 RX ORDER — UBIDECARENONE 100 MG
1 CAPSULE ORAL DAILY
Qty: 90 CAPSULE | Refills: 1 | Status: SHIPPED | OUTPATIENT
Start: 2024-10-08

## 2025-03-26 DIAGNOSIS — F20.9 SCHIZOPHRENIA, UNSPECIFIED: ICD-10-CM

## 2025-03-26 RX ORDER — UBIDECARENONE 100 MG
1 CAPSULE ORAL DAILY
Qty: 90 CAPSULE | Refills: 3 | Status: SHIPPED | OUTPATIENT
Start: 2025-03-26

## 2025-08-15 ENCOUNTER — TELEPHONE (OUTPATIENT)
Age: 40
End: 2025-08-15